# Patient Record
Sex: MALE | URBAN - METROPOLITAN AREA
[De-identification: names, ages, dates, MRNs, and addresses within clinical notes are randomized per-mention and may not be internally consistent; named-entity substitution may affect disease eponyms.]

---

## 2021-04-28 ENCOUNTER — HISTORICAL (OUTPATIENT)
Dept: INFECTIOUS DISEASES | Facility: HOSPITAL | Age: 41
End: 2021-04-28

## 2024-05-02 DIAGNOSIS — C18.9 COLON CANCER METASTASIZED TO LUNG: Primary | ICD-10-CM

## 2024-05-02 DIAGNOSIS — C78.00 COLON CANCER METASTASIZED TO LUNG: Primary | ICD-10-CM

## 2024-05-06 PROBLEM — C19 COLORECTAL CANCER, STAGE IV: Status: ACTIVE | Noted: 2024-05-06

## 2024-05-06 NOTE — PROGRESS NOTES
Referring physician: Self  Reason for referral: Metastatic Colorectal Carcinoma      Subjective:       Patient ID: Owen Swanson is a 44 y.o. male.    Rectal Cancer Stage IV--Diagnosed 23  Biopsy/pathology:   1. Colonoscopy 23--large fungating rectal mass 5-10cm from anal verge to approximately 20cm, biopsy with well differentiated colorectal adenocarcinoma.   2. Bronchoscopy with biopsy of endobronchial mass RUL biopsy done 3/16/23--metastatic poorly differentiated colonic adenocarcinoma. Foundation One with PD-L1 0%, ALK rearrangement, KRAS G12V, AKT2 amplification, APCV, FBXW7, CCND3 amplification, TP53, VEGFA amplification, TMB 8, MSI-high not detected, elevated tumor fraction.   3. Liquid Biopsy Guardant 360 done 24--KRAS G12V, APC , TP53 R248W, MSI high not detected, NRAS, BRAF, Her2 and NTRK negative.  Imagin. PET/CT 24--interval enlargement and increased metabolic activity of primary rectal mass, c/w local disease progression, stable hypermetabolic hepatic metastatic disease, grossly stable infiltrating tumor in RUL bronchus, suspected subtle interval enlargement of subcentimeter nodule in TIGRE and RLL.   2. CT C/A/P w/ contrast done at [facility] 3/18/24--Similar to slight increase in size of left upper and right lower lobe pulmonary nodules. Grossly similar right upper lobe peribronchial lesion and nodularity, similar to slight increase in size of conglomerate maria isabel hepatic and peripancreatic lymphadenopathy, grossly similar metastatic liver lesions.   3. PET/CT 24--Findings concerning for progression of primary rectal mass and metastatic disease.     Treatment history:  FOLFOX/Avastin 23 followed by maintenance.  Progression in 2023.   FOLFIRI/Avastin 2023--24 (stopped due to progression).    Current treatment plan:   Alectinib 600mg PO BID to start soon.     Chief Complaint: Other Misc (NPO), Diarrhea, Constipation, and Abdominal Pain    HPI  43 yo male  diagnosed with Stage IV rectal cancer in 3/2023. Initial CT showed liver metastases, abdominal adenopathy, rectal mass, and RUL obstructing lung mass. He underwent colonoscopy and showed a large rectal cancer, biopsy positive for adenocarcinoma. Patient also underwent bronchoscopy with biopsy of RUL endobronchial mass causing obstruction. Pathology c/w metastatic colorectal adenocarcinoma. Foundation One showed KRAS mutation and ALK rearrangement. No other actionable mutations. He was seen by Dr. Crook at [facility] and started on FOLFOX Avastin and went on to receive maintenance treatment with 5FU + Avastin. He experienced progression around 11/2023 and was changed to FOLFIRI. Most recent PET/CT from 4/29/24 shows progression. His physician at [facility] has recommended potential immunotherapy for TMB of 8. Patient presents to clinic for second opinion with his wife. Patient is still very active. Reports that he has some RUQ pain at times, for which he takes only Ibuprofen. He is still working. 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 He does have issues with constipation alternating with diarrhea as well. Discussed my recommendations for treatment with Alectinib based on ALK mutation and he is in agreement. He does mention that he had a lot of nausea with his chemotherapy.     Past Medical History:   Diagnosis Date    Anxiety     Hypertension       History reviewed. No pertinent surgical history.  Family History   Problem Relation Name Age of Onset    Hypertension Mother      Coronary artery disease Father      Hypertension Father      Diabetes Father       Social History     Socioeconomic History    Marital status:    Tobacco Use    Smoking status: Former     Types: Cigarettes    Smokeless tobacco: Never   Substance and Sexual Activity    Alcohol use: Not Currently    Drug use: Yes     Types: Marijuana       Review of patient's allergies indicates:  No Known Allergies   Current Outpatient  Medications on File Prior to Visit   Medication Sig Dispense Refill    albuterol (PROVENTIL) 2.5 mg /3 mL (0.083 %) nebulizer solution Inhale 2.5 mg into the lungs every 6 (six) hours as needed.      amlodipine-benazepril 10-20mg (LOTREL) 10-20 mg per capsule Take 1 capsule by mouth once daily.      ascorbic acid, vitamin C, (VITAMIN C) 1000 MG tablet Take 4 tablets by mouth every evening.      cholecalciferol, vitamin D3, (VITAMIN D3) 25 mcg (1,000 unit) capsule Take 1 capsule by mouth every evening.      fluoride, sodium, (PREVIDENT 5000 BOOSTER PLUS) 1.1 % Pste       gabapentin (NEURONTIN) 300 MG capsule Start with taking 300 mg once nightly x 3 days and then increase to 300 mg twice daily x 3 days and then 300 mg three times a day thereafter.      magnesium 200 mg Tab Take 2 tablets by mouth every evening.      omeprazole (PRILOSEC) 20 MG capsule Take 1 capsule by mouth every morning.      ondansetron (ZOFRAN) 4 MG tablet Take 4 mg by mouth every 8 (eight) hours as needed.      promethazine (PHENERGAN) 25 MG tablet Take 25 mg by mouth every 6 (six) hours as needed.      zinc acetate 50 mg (zinc) Cap Take 1 tablet by mouth every evening.      cyanocobalamin (VITAMIN B-12) 100 MCG tablet Take 1 tablet by mouth every evening. (Patient not taking: Reported on 5/9/2024)       No current facility-administered medications on file prior to visit.      Review of Systems   Constitutional:  Negative for appetite change and unexpected weight change.   HENT:  Negative for mouth sores.    Eyes:  Negative for visual disturbance.   Respiratory:  Negative for cough and shortness of breath.    Cardiovascular:  Positive for chest pain.   Gastrointestinal:  Positive for abdominal pain and diarrhea.   Genitourinary:  Positive for frequency.   Musculoskeletal:  Negative for back pain.   Neurological:  Negative for headaches.   Hematological:  Negative for adenopathy.   Psychiatric/Behavioral:  The patient is nervous/anxious.         "     Vitals:    05/09/24 1407   BP: 124/89   Pulse: 86   Resp: 14   Temp: 98.2 °F (36.8 °C)   TempSrc: Oral   SpO2: 97%   Weight: 97.4 kg (214 lb 11.2 oz)   Height: 5' 9" (1.753 m)      Physical Exam  Constitutional:       General: He is awake.      Appearance: Normal appearance.   HENT:      Head: Normocephalic and atraumatic.      Nose: Nose normal.      Mouth/Throat:      Mouth: Mucous membranes are moist.   Eyes:      General: Vision grossly intact.      Extraocular Movements: Extraocular movements intact.      Conjunctiva/sclera: Conjunctivae normal.   Cardiovascular:      Rate and Rhythm: Normal rate and regular rhythm.      Heart sounds: Normal heart sounds.   Pulmonary:      Effort: Pulmonary effort is normal.      Breath sounds: Normal breath sounds.   Chest:      Comments: Left chest wall mediport in place  Abdominal:      General: Bowel sounds are normal. There is no distension.      Palpations: Abdomen is soft.      Tenderness: There is no abdominal tenderness.   Musculoskeletal:      Cervical back: Normal range of motion and neck supple.      Right lower leg: No edema.      Left lower leg: No edema.   Lymphadenopathy:      Cervical: No cervical adenopathy.      Upper Body:      Right upper body: No supraclavicular adenopathy.      Left upper body: No supraclavicular adenopathy.   Skin:     General: Skin is warm.   Neurological:      Mental Status: He is alert and oriented to person, place, and time.      Motor: Motor function is intact.   Psychiatric:         Mood and Affect: Mood normal.         Speech: Speech normal.         Behavior: Behavior is cooperative.         Judgment: Judgment normal.       Office Visit on 05/09/2024   Component Date Value Ref Range Status    WBC 05/09/2024 8.27  4.50 - 11.50 x10(3)/mcL Final    RBC 05/09/2024 4.37 (L)  4.70 - 6.10 x10(6)/mcL Final    Hgb 05/09/2024 13.4 (L)  14.0 - 18.0 g/dL Final    Hct 05/09/2024 39.1 (L)  42.0 - 52.0 % Final    MCV 05/09/2024 89.5  80.0 - " 94.0 fL Final    MCH 05/09/2024 30.7  27.0 - 31.0 pg Final    MCHC 05/09/2024 34.3  33.0 - 36.0 g/dL Final    RDW 05/09/2024 14.3  11.5 - 17.0 % Final    Platelet 05/09/2024 224  130 - 400 x10(3)/mcL Final    MPV 05/09/2024 9.9  7.4 - 10.4 fL Final    Neut % 05/09/2024 57.8  % Final    Lymph % 05/09/2024 17.9  % Final    Mono % 05/09/2024 21.3  % Final    Eos % 05/09/2024 1.8  % Final    Basophil % 05/09/2024 0.6  % Final    Lymph # 05/09/2024 1.48  0.6 - 4.6 x10(3)/mcL Final    Neut # 05/09/2024 4.78  2.1 - 9.2 x10(3)/mcL Final    Mono # 05/09/2024 1.76 (H)  0.1 - 1.3 x10(3)/mcL Final    Eos # 05/09/2024 0.15  0 - 0.9 x10(3)/mcL Final    Baso # 05/09/2024 0.05  <=0.2 x10(3)/mcL Final    IG# 05/09/2024 0.05 (H)  0 - 0.04 x10(3)/mcL Final    IG% 05/09/2024 0.6  % Final                  Assessment:       1. Colorectal cancer, stage IV    2. Colon cancer metastasized to lung         Plan:       Patient with Stage IV rectal cancer, lung, liver, abdominal mariya metastases diagnosed in 2/2023. KRAS mutated.  Patient has failed FOLFOX avastin and FOLFIRI avastin.  Most Recent PET from 4/29/24 shows disease progression and CEA rising.    In review of previous Foundation One testing, noted to have an ALK rearrangement.   I did a literature review and there have been documented responses in colon cancers treatment with ALK inhibitors.   Case presented at [facility] clinical trials tumor board. No current clinical trials available, but treatment recommended with Alectinib.   Case will also be presented at molecular tumor board.    I discussed conventional treatment with 3rd line Stivarga vs. Lonsurf which have not historically had good outcomes.  Would favor a more aggressive approach to treatment given his excellent functional status and young age.  I do not think that Keytruda or immunotherapy would be effective given JESUS.     Will send prescription for Alectinib 600mg PO BID to specialty pharmacy for insurance approval.    Patient given information from [employer] on Alectinib and discussed the common side effects.   PharmD to provide further education as needed.     Recommendations for monitoring are LFT's Q2 weeks X 3 months then monthly.  CK every 2 weeks X 4 weeks then as clinically indicated.    Hopefully he will obtain his medication w/n a week.  F/u in 3 weeks labs and toxicity check visit.    Plan to repeat CT after 3 months of treatment. Will need to have images from most recent PET and CT uploaded into our system from [facility] for comparison.    Start Tramadol PRN for RUQ pain, prescription given today #90.  Start mediport flushes in June.    Patient has no FH of any cancer, may need genetic testing due to the APC mutation.    All questions answered at this time.    Discussed incurable nature of disease and continued palliative goals of treatment.      Renee Gipson MD

## 2024-05-08 ENCOUNTER — TUMOR BOARD CONFERENCE (OUTPATIENT)
Dept: HEMATOLOGY/ONCOLOGY | Facility: CLINIC | Age: 44
End: 2024-05-08
Payer: COMMERCIAL

## 2024-05-08 RX ORDER — AMLODIPINE AND BENAZEPRIL HYDROCHLORIDE 10; 20 MG/1; MG/1
1 CAPSULE ORAL DAILY
COMMUNITY
Start: 2024-03-26

## 2024-05-08 RX ORDER — PROMETHAZINE HYDROCHLORIDE 25 MG/1
25 TABLET ORAL EVERY 6 HOURS PRN
COMMUNITY
Start: 2024-03-05

## 2024-05-08 RX ORDER — ALBUTEROL SULFATE 0.83 MG/ML
2.5 SOLUTION RESPIRATORY (INHALATION) EVERY 6 HOURS PRN
COMMUNITY
Start: 2024-04-29

## 2024-05-08 RX ORDER — MAGNESIUM 200 MG
2 TABLET ORAL NIGHTLY
COMMUNITY

## 2024-05-08 RX ORDER — PNV NO.95/FERROUS FUM/FOLIC AC 28MG-0.8MG
1 TABLET ORAL NIGHTLY
COMMUNITY

## 2024-05-08 RX ORDER — VIT C/E/ZN/COPPR/LUTEIN/ZEAXAN 250MG-90MG
1 CAPSULE ORAL NIGHTLY
COMMUNITY

## 2024-05-08 RX ORDER — SODIUM FLUORIDE 6 MG/ML
PASTE, DENTIFRICE DENTAL
COMMUNITY
Start: 2023-10-02

## 2024-05-08 RX ORDER — GABAPENTIN 300 MG/1
CAPSULE ORAL
COMMUNITY
Start: 2023-10-23

## 2024-05-08 RX ORDER — OMEPRAZOLE 20 MG/1
1 CAPSULE, DELAYED RELEASE ORAL EVERY MORNING
COMMUNITY
Start: 2024-03-21

## 2024-05-08 RX ORDER — IBUPROFEN 100 MG/5ML
4 SUSPENSION, ORAL (FINAL DOSE FORM) ORAL NIGHTLY
COMMUNITY

## 2024-05-08 RX ORDER — ONDANSETRON 4 MG/1
4 TABLET, FILM COATED ORAL EVERY 8 HOURS PRN
COMMUNITY
Start: 2024-03-25

## 2024-05-08 NOTE — PROGRESS NOTES
[facility] Tumor Board    Date: 5/8/2024    Patient Name: Owen Swanson    MRN: [id number]    Diagnosis: Metastatic Rectal Cancer - Diagnosed in 2/2023.    Referring Provider: Dr. Brandan Braswell PCTP Providers:     Dr. Lico Trujillo, Estefany River NP, Jo-Ann Miller NP    Patient Summary:  Pathology:    Has failed Chemotherapy  Failed chemotherapy: 1. FOLFOX/Avastin. 2. FOLFIRI/Avastin (11/15/23) > PET on 4/29 showing progression.    Current treatment(s):    ECOG: Restricted in physically strenuous activity but ambulatory and able to carry out work of a light or sedentary nature, e.g., light house work, office work    Molecular Workup:    [facility] Details: ALK rearrangement, KRAS G12V, AKT2 amplifications, [id number], [id number], CCND3 amplifications, TP53, VEGFA amplifications, TMB 8, PD-L1 0, JESUS, HER2 negative.      Board Recommendations:    Standard of care recommendations: 1. Alectinib 2. Lonsurf+avastin. Present a molecular TB.   Trial recommendations: Late phase: no available late phase trials  Early phase:  DNQ IGM d/t folfiri, also no slots. BGB: DNQ d/t prior lines of therapy. NTX waitlist but no slots available right now. Add to early phase waitlist.

## 2024-05-08 NOTE — Clinical Note
No trials for this patient right. Would recommend Alectinib as next line. We will add him to our waitlist for early phase trials. This patient may be good to discuss at our molecular TB. Please let Corazon know if you are interested in presenting this patient.   Thanks, Estefany

## 2024-05-09 ENCOUNTER — OFFICE VISIT (OUTPATIENT)
Dept: HEMATOLOGY/ONCOLOGY | Facility: CLINIC | Age: 44
End: 2024-05-09
Payer: COMMERCIAL

## 2024-05-09 ENCOUNTER — DOCUMENTATION ONLY (OUTPATIENT)
Dept: HEMATOLOGY/ONCOLOGY | Facility: CLINIC | Age: 44
End: 2024-05-09
Payer: COMMERCIAL

## 2024-05-09 VITALS
SYSTOLIC BLOOD PRESSURE: 124 MMHG | WEIGHT: 214.69 LBS | OXYGEN SATURATION: 97 % | HEART RATE: 86 BPM | DIASTOLIC BLOOD PRESSURE: 89 MMHG | BODY MASS INDEX: 31.8 KG/M2 | HEIGHT: 69 IN | RESPIRATION RATE: 14 BRPM | TEMPERATURE: 98 F

## 2024-05-09 DIAGNOSIS — C78.00 COLON CANCER METASTASIZED TO LUNG: ICD-10-CM

## 2024-05-09 DIAGNOSIS — C18.9 COLON CANCER METASTASIZED TO LUNG: ICD-10-CM

## 2024-05-09 DIAGNOSIS — C19 COLORECTAL CANCER, STAGE IV: Primary | ICD-10-CM

## 2024-05-09 DIAGNOSIS — C78.00 COLON CANCER METASTASIZED TO LUNG: Primary | ICD-10-CM

## 2024-05-09 DIAGNOSIS — C18.9 COLON CANCER METASTASIZED TO LUNG: Primary | ICD-10-CM

## 2024-05-09 LAB
ALBUMIN SERPL-MCNC: 3.9 G/DL (ref 3.5–5)
ALBUMIN/GLOB SERPL: 1.1 RATIO (ref 1.1–2)
ALP SERPL-CCNC: 153 UNIT/L (ref 40–150)
ALT SERPL-CCNC: 27 UNIT/L (ref 0–55)
AST SERPL-CCNC: 36 UNIT/L (ref 5–34)
BASOPHILS # BLD AUTO: 0.05 X10(3)/MCL
BASOPHILS NFR BLD AUTO: 0.6 %
BILIRUB SERPL-MCNC: 0.4 MG/DL
BUN SERPL-MCNC: 14 MG/DL (ref 8.9–20.6)
CALCIUM SERPL-MCNC: 9.3 MG/DL (ref 8.4–10.2)
CEA SERPL-MCNC: 904.14 NG/ML (ref 0–3)
CHLORIDE SERPL-SCNC: 108 MMOL/L (ref 98–107)
CK SERPL-CCNC: 56 U/L (ref 30–200)
CO2 SERPL-SCNC: 25 MMOL/L (ref 22–29)
CREAT SERPL-MCNC: 0.94 MG/DL (ref 0.73–1.18)
EOSINOPHIL # BLD AUTO: 0.15 X10(3)/MCL (ref 0–0.9)
EOSINOPHIL NFR BLD AUTO: 1.8 %
ERYTHROCYTE [DISTWIDTH] IN BLOOD BY AUTOMATED COUNT: 14.3 % (ref 11.5–17)
GFR SERPLBLD CREATININE-BSD FMLA CKD-EPI: >60 ML/MIN/1.73/M2
GLOBULIN SER-MCNC: 3.5 GM/DL (ref 2.4–3.5)
GLUCOSE SERPL-MCNC: 96 MG/DL (ref 74–100)
HCT VFR BLD AUTO: 39.1 % (ref 42–52)
HGB BLD-MCNC: 13.4 G/DL (ref 14–18)
IMM GRANULOCYTES # BLD AUTO: 0.05 X10(3)/MCL (ref 0–0.04)
IMM GRANULOCYTES NFR BLD AUTO: 0.6 %
LYMPHOCYTES # BLD AUTO: 1.48 X10(3)/MCL (ref 0.6–4.6)
LYMPHOCYTES NFR BLD AUTO: 17.9 %
MCH RBC QN AUTO: 30.7 PG (ref 27–31)
MCHC RBC AUTO-ENTMCNC: 34.3 G/DL (ref 33–36)
MCV RBC AUTO: 89.5 FL (ref 80–94)
MONOCYTES # BLD AUTO: 1.76 X10(3)/MCL (ref 0.1–1.3)
MONOCYTES NFR BLD AUTO: 21.3 %
NEUTROPHILS # BLD AUTO: 4.78 X10(3)/MCL (ref 2.1–9.2)
NEUTROPHILS NFR BLD AUTO: 57.8 %
PLATELET # BLD AUTO: 224 X10(3)/MCL (ref 130–400)
PMV BLD AUTO: 9.9 FL (ref 7.4–10.4)
POTASSIUM SERPL-SCNC: 4.9 MMOL/L (ref 3.5–5.1)
PROT SERPL-MCNC: 7.4 GM/DL (ref 6.4–8.3)
RBC # BLD AUTO: 4.37 X10(6)/MCL (ref 4.7–6.1)
SODIUM SERPL-SCNC: 139 MMOL/L (ref 136–145)
WBC # SPEC AUTO: 8.27 X10(3)/MCL (ref 4.5–11.5)

## 2024-05-09 PROCEDURE — 82378 CARCINOEMBRYONIC ANTIGEN: CPT | Performed by: INTERNAL MEDICINE

## 2024-05-09 PROCEDURE — 4010F ACE/ARB THERAPY RXD/TAKEN: CPT | Mod: CPTII,S$GLB,, | Performed by: INTERNAL MEDICINE

## 2024-05-09 PROCEDURE — 1159F MED LIST DOCD IN RCRD: CPT | Mod: CPTII,S$GLB,, | Performed by: INTERNAL MEDICINE

## 2024-05-09 PROCEDURE — 85025 COMPLETE CBC W/AUTO DIFF WBC: CPT | Performed by: INTERNAL MEDICINE

## 2024-05-09 PROCEDURE — 82550 ASSAY OF CK (CPK): CPT | Performed by: INTERNAL MEDICINE

## 2024-05-09 PROCEDURE — 80053 COMPREHEN METABOLIC PANEL: CPT | Performed by: INTERNAL MEDICINE

## 2024-05-09 PROCEDURE — 3008F BODY MASS INDEX DOCD: CPT | Mod: CPTII,S$GLB,, | Performed by: INTERNAL MEDICINE

## 2024-05-09 PROCEDURE — 99999 PR PBB SHADOW E&M-EST. PATIENT-LVL IV: CPT | Mod: PBBFAC,,, | Performed by: INTERNAL MEDICINE

## 2024-05-09 PROCEDURE — 3074F SYST BP LT 130 MM HG: CPT | Mod: CPTII,S$GLB,, | Performed by: INTERNAL MEDICINE

## 2024-05-09 PROCEDURE — 3079F DIAST BP 80-89 MM HG: CPT | Mod: CPTII,S$GLB,, | Performed by: INTERNAL MEDICINE

## 2024-05-09 PROCEDURE — 99205 OFFICE O/P NEW HI 60 MIN: CPT | Mod: S$GLB,,, | Performed by: INTERNAL MEDICINE

## 2024-05-09 PROCEDURE — 36415 COLL VENOUS BLD VENIPUNCTURE: CPT | Performed by: INTERNAL MEDICINE

## 2024-05-09 RX ORDER — TRAMADOL HYDROCHLORIDE 50 MG/1
50 TABLET ORAL EVERY 6 HOURS PRN
Qty: 90 TABLET | Refills: 3 | Status: SHIPPED | OUTPATIENT
Start: 2024-05-09

## 2024-05-09 NOTE — Clinical Note
Will need his recent CT and recent PET from [facility] images added to our system for comparison in the future as his scans will be done here from now on.

## 2024-05-09 NOTE — PROGRESS NOTES
Spoke with patient and wife following new patient appointment with Dr. Gipson. Patient is in good spirits and feels good about plan moving forward. He has no questions or concerns at this time. Explained my role as RN navigator. He is aware of how to reach navigator should he need to.

## 2024-05-13 DIAGNOSIS — C78.00 COLON CANCER METASTASIZED TO LUNG: Primary | ICD-10-CM

## 2024-05-13 DIAGNOSIS — C19 COLORECTAL CANCER, STAGE IV: ICD-10-CM

## 2024-05-13 DIAGNOSIS — C18.9 COLON CANCER METASTASIZED TO LUNG: Primary | ICD-10-CM

## 2024-05-14 DIAGNOSIS — C19 COLORECTAL CANCER, STAGE IV: Primary | ICD-10-CM

## 2024-05-14 DIAGNOSIS — C78.00 COLON CANCER METASTASIZED TO LUNG: ICD-10-CM

## 2024-05-14 DIAGNOSIS — C18.9 COLON CANCER METASTASIZED TO LUNG: ICD-10-CM

## 2024-05-27 NOTE — PROGRESS NOTES
Subjective:       Patient ID: Owen Swanson is a 44 y.o. male.    Rectal Cancer Stage IV--Diagnosed 23  Biopsy/pathology:   1. Colonoscopy 23--large fungating rectal mass 5-10cm from anal verge to approximately 20cm, biopsy with well differentiated colorectal adenocarcinoma.   2. Bronchoscopy with biopsy of endobronchial mass RUL biopsy done 3/16/23--metastatic poorly differentiated colonic adenocarcinoma. Foundation One with PD-L1 0%, ALK rearrangement, KRAS G12V, AKT2 amplification, APCV, FBXW7, CCND3 amplification, TP53, VEGFA amplification, TMB 8, MSI-high not detected, elevated tumor fraction.   3. Liquid Biopsy Guardant 360 done 24--KRAS G12V, APC , TP53 R248W, MSI high not detected, NRAS, BRAF, Her2 and NTRK negative.  Imagin. PET/CT 24--interval enlargement and increased metabolic activity of primary rectal mass, c/w local disease progression, stable hypermetabolic hepatic metastatic disease, grossly stable infiltrating tumor in RUL bronchus, suspected subtle interval enlargement of subcentimeter nodule in TIGRE and RLL.   2. CT C/A/P w/ contrast done at [facility] 3/18/24--Similar to slight increase in size of left upper and right lower lobe pulmonary nodules. Grossly similar right upper lobe peribronchial lesion and nodularity, similar to slight increase in size of conglomerate maria isabel hepatic and peripancreatic lymphadenopathy, grossly similar metastatic liver lesions.   3. PET/CT 24--Findings concerning for progression of primary rectal mass and metastatic disease.   4. CT A/P done 24--long segment rectal wall thickening and nodularity compatible with known rectal malignancy, multiple calcified abdominopelvic lymph nodes compatible with metastatic nodes. Reference portal caval lymph node measures 2.4 cm short axis, Multiple bilobar hypodense, variably calcified hepatic masses compatible with colorectal metastases. Largest discrete lesion in the right lobe measures  approximately 8.7 cm, findings similar to prior.     Treatment history:  FOLFOX/Avastin 2/22/23 followed by maintenance.  Progression in 11/2023.   FOLFIRI/Avastin 11/2023--4/29/24 (stopped due to progression).    Current treatment plan:   Alectinib 600mg PO BID started 5/22/24.     Chief Complaint: Pain (Pt reports that he is passing gas and no longer passing liquid since ER visit.), Constipation, and Other Misc (Pt reports muscle fatigue in shoulders, hips and legs yesterday.)    HPI  Patient presents for follow-up of Stage IV rectal cancer. He did start on the Alecensa last week on 5/22/24. So far, he reports having fatigue, constipation and myalgias. He also went to ED for constipation. CT scan showed no obstruction. He was placed on Lactulose which has helped some, but he is still having hard stools. We discussed using Senekot-S OTC and taking 2-3 PO BID. He continues with intermittent right sided abdominal pain from known liver metastases for which he is taking pain medication and it helps. But he does not take as often as he needs.     Past Medical History:   Diagnosis Date    Anxiety     Hypertension       History reviewed. No pertinent surgical history.  Family History   Problem Relation Name Age of Onset    Hypertension Mother      Coronary artery disease Father      Hypertension Father      Diabetes Father       Social History     Socioeconomic History    Marital status:    Tobacco Use    Smoking status: Former     Types: Cigarettes    Smokeless tobacco: Never   Substance and Sexual Activity    Alcohol use: Not Currently    Drug use: Yes     Types: Marijuana       Review of patient's allergies indicates:  No Known Allergies   Current Outpatient Medications on File Prior to Visit   Medication Sig Dispense Refill    albuterol (PROVENTIL) 2.5 mg /3 mL (0.083 %) nebulizer solution Inhale 2.5 mg into the lungs every 6 (six) hours as needed.      alectinib (ALECENSA) 150 mg Cap Take 4 capsules (600 mg  total) by mouth twice daily with food. 240 capsule 3    amlodipine-benazepril 10-20mg (LOTREL) 10-20 mg per capsule Take 1 capsule by mouth once daily.      ascorbic acid, vitamin C, (VITAMIN C) 1000 MG tablet Take 4 tablets by mouth every evening.      cholecalciferol, vitamin D3, (VITAMIN D3) 25 mcg (1,000 unit) capsule Take 1 capsule by mouth every evening.      fluoride, sodium, (PREVIDENT 5000 BOOSTER PLUS) 1.1 % Pste       gabapentin (NEURONTIN) 300 MG capsule Start with taking 300 mg once nightly x 3 days and then increase to 300 mg twice daily x 3 days and then 300 mg three times a day thereafter.      lactulose (CHRONULAC) 20 gram/30 mL Soln Take 30 mLs (20 g total) by mouth 2 (two) times daily. for 5 days 300 mL 0    magnesium 200 mg Tab Take 2 tablets by mouth every evening.      omeprazole (PRILOSEC) 20 MG capsule Take 1 capsule by mouth every morning.      ondansetron (ZOFRAN) 4 MG tablet Take 4 mg by mouth every 8 (eight) hours as needed.      promethazine (PHENERGAN) 25 MG tablet Take 25 mg by mouth every 6 (six) hours as needed.      traMADoL (ULTRAM) 50 mg tablet Take 1 tablet (50 mg total) by mouth every 6 (six) hours as needed for Pain. 90 tablet 3    zinc acetate 50 mg (zinc) Cap Take 1 tablet by mouth every evening.      cyanocobalamin (VITAMIN B-12) 100 MCG tablet Take 1 tablet by mouth every evening. (Patient not taking: Reported on 5/30/2024)       No current facility-administered medications on file prior to visit.      Review of Systems   Constitutional:  Positive for fatigue. Negative for appetite change and unexpected weight change.   HENT:  Negative for mouth sores.    Eyes:  Negative for visual disturbance.   Respiratory:  Negative for cough and shortness of breath.    Cardiovascular:  Negative for chest pain.   Gastrointestinal:  Positive for abdominal pain and constipation. Negative for diarrhea.   Genitourinary:  Positive for frequency.   Musculoskeletal:  Positive for myalgias.  "Negative for back pain.   Neurological:  Negative for headaches.   Hematological:  Negative for adenopathy.   Psychiatric/Behavioral:  The patient is nervous/anxious.             Vitals:    05/30/24 0854   BP: 118/74   Pulse: 63   Resp: 14   Temp: 98 °F (36.7 °C)   TempSrc: Oral   SpO2: 99%   Weight: 99.2 kg (218 lb 9.6 oz)   Height: 5' 9" (1.753 m)        Physical Exam  Constitutional:       General: He is awake.      Appearance: Normal appearance.   HENT:      Head: Normocephalic and atraumatic.      Nose: Nose normal.      Mouth/Throat:      Mouth: Mucous membranes are moist.   Eyes:      General: Vision grossly intact.      Extraocular Movements: Extraocular movements intact.      Conjunctiva/sclera: Conjunctivae normal.   Cardiovascular:      Rate and Rhythm: Normal rate and regular rhythm.      Heart sounds: Normal heart sounds.   Pulmonary:      Effort: Pulmonary effort is normal.      Breath sounds: Normal breath sounds.   Chest:      Comments: Left chest wall mediport in place  Abdominal:      General: Bowel sounds are normal. There is no distension.      Palpations: Abdomen is soft.      Tenderness: There is no abdominal tenderness.   Musculoskeletal:      Cervical back: Normal range of motion and neck supple.      Right lower leg: No edema.      Left lower leg: No edema.   Lymphadenopathy:      Cervical: No cervical adenopathy.      Upper Body:      Right upper body: No supraclavicular adenopathy.      Left upper body: No supraclavicular adenopathy.   Skin:     General: Skin is warm.   Neurological:      Mental Status: He is alert and oriented to person, place, and time.      Motor: Motor function is intact.   Psychiatric:         Mood and Affect: Mood normal.         Speech: Speech normal.         Behavior: Behavior is cooperative.         Judgment: Judgment normal.       Lab Visit on 05/30/2024   Component Date Value Ref Range Status    WBC 05/30/2024 12.38 (H)  4.50 - 11.50 x10(3)/mcL Final    RBC " 05/30/2024 4.36 (L)  4.70 - 6.10 x10(6)/mcL Final    Hgb 05/30/2024 12.9 (L)  14.0 - 18.0 g/dL Final    Hct 05/30/2024 38.0 (L)  42.0 - 52.0 % Final    MCV 05/30/2024 87.2  80.0 - 94.0 fL Final    MCH 05/30/2024 29.6  27.0 - 31.0 pg Final    MCHC 05/30/2024 33.9  33.0 - 36.0 g/dL Final    RDW 05/30/2024 14.3  11.5 - 17.0 % Final    Platelet 05/30/2024 275  130 - 400 x10(3)/mcL Final    MPV 05/30/2024 10.1  7.4 - 10.4 fL Final    Neut % 05/30/2024 70.8  % Final    Lymph % 05/30/2024 15.7  % Final    Mono % 05/30/2024 9.2  % Final    Eos % 05/30/2024 3.8  % Final    Basophil % 05/30/2024 0.3  % Final    Lymph # 05/30/2024 1.94  0.6 - 4.6 x10(3)/mcL Final    Neut # 05/30/2024 8.77  2.1 - 9.2 x10(3)/mcL Final    Mono # 05/30/2024 1.14  0.1 - 1.3 x10(3)/mcL Final    Eos # 05/30/2024 0.47  0 - 0.9 x10(3)/mcL Final    Baso # 05/30/2024 0.04  <=0.2 x10(3)/mcL Final    IG# 05/30/2024 0.02  0 - 0.04 x10(3)/mcL Final    IG% 05/30/2024 0.2  % Final         Assessment:       1. Colorectal cancer, stage IV    2. Colon cancer metastasized to lung        Plan:       Patient with Stage IV rectal cancer, lung, liver, abdominal mariya metastases diagnosed in 2/2023. KRAS mutated.  Patient has failed FOLFOX avastin and FOLFIRI avastin.  Most Recent PET from 4/29/24 shows disease progression and CEA rising.    In review of previous Foundation One testing, noted to have an ALK rearrangement.   I did a literature review and there have been documented responses in colon cancers treatment with ALK inhibitors.   Case presented at [facility] clinical trials tumor board. No current clinical trials available, but treatment recommended with Alectinib.   Case will also be presented at molecular tumor board on 6/4/24.    I discussed conventional treatment with 3rd line Stivarga vs. Lonsurf which have not historically had good outcomes.  Would favor a more aggressive approach to treatment given his excellent functional status and young age.  I do not  think that Keytruda or immunotherapy would be effective given JESUS.     Patient started Alectinib 600mg PO BID on 5/22/24.  ER visit for constipation on 5/28/24. No obstruction, prescribed lactulose.  He is tolerating Alecensa okay, but does have some fatigue, constipation and myalgias.  CBC diff today with mildly elevated WBC, suspect this is from his cancer. Will f/u CMP and CK.   Continue same dose. If myalgias worsen, could consider dose reduction.     Recommendations for monitoring are LFT's Q2 weeks X 3 months then monthly.  CK every 2 weeks X 4 weeks then as clinically indicated.    F/u in 2 weeks with repeat CBC, CMP, CK and will repeat CEA.     Plan to repeat CT after 3 months of treatment in 8/2024.    Continue Tramadol PRN for RUQ pain.  Start mediport flushes in June.    Patient has no FH of any cancer, may need genetic testing due to the APC mutation.    All questions answered at this time.    Discussed incurable nature of disease and continued palliative goals of treatment.      Renee Gipson MD    Therapy Plan Information  Flushes  heparin, porcine (PF) 100 unit/mL injection flush 500 Units  500 Units, Intravenous, Every 12 weeks  sodium chloride 0.9% flush 10 mL  10 mL, Intravenous, Every 12 weeks

## 2024-05-28 ENCOUNTER — HOSPITAL ENCOUNTER (EMERGENCY)
Facility: HOSPITAL | Age: 44
Discharge: HOME | End: 2024-05-28
Attending: EMERGENCY MEDICINE
Payer: COMMERCIAL

## 2024-05-28 ENCOUNTER — TELEPHONE (OUTPATIENT)
Dept: HEMATOLOGY/ONCOLOGY | Facility: CLINIC | Age: 44
End: 2024-05-28
Payer: COMMERCIAL

## 2024-05-28 VITALS
OXYGEN SATURATION: 99 % | BODY MASS INDEX: 34.1 KG/M2 | HEIGHT: 68 IN | WEIGHT: 225 LBS | TEMPERATURE: 99 F | RESPIRATION RATE: 16 BRPM | DIASTOLIC BLOOD PRESSURE: 96 MMHG | SYSTOLIC BLOOD PRESSURE: 185 MMHG | HEART RATE: 75 BPM

## 2024-05-28 DIAGNOSIS — C79.9 METASTASIS FROM RECTAL CANCER: ICD-10-CM

## 2024-05-28 DIAGNOSIS — K59.00 CONSTIPATION, UNSPECIFIED CONSTIPATION TYPE: Primary | ICD-10-CM

## 2024-05-28 DIAGNOSIS — C20 METASTASIS FROM RECTAL CANCER: ICD-10-CM

## 2024-05-28 LAB
ALBUMIN SERPL-MCNC: 3.7 G/DL (ref 3.5–5)
ALBUMIN/GLOB SERPL: 0.9 RATIO (ref 1.1–2)
ALP SERPL-CCNC: 171 UNIT/L (ref 40–150)
ALT SERPL-CCNC: 30 UNIT/L (ref 0–55)
ANION GAP SERPL CALC-SCNC: 9 MEQ/L
AST SERPL-CCNC: 45 UNIT/L (ref 5–34)
BACTERIA #/AREA URNS AUTO: ABNORMAL /HPF
BASOPHILS # BLD AUTO: 0.05 X10(3)/MCL
BASOPHILS NFR BLD AUTO: 0.4 %
BILIRUB SERPL-MCNC: 0.6 MG/DL
BILIRUB UR QL STRIP.AUTO: NEGATIVE
BUN SERPL-MCNC: 18 MG/DL (ref 8.9–20.6)
CALCIUM SERPL-MCNC: 9.3 MG/DL (ref 8.4–10.2)
CHLORIDE SERPL-SCNC: 107 MMOL/L (ref 98–107)
CLARITY UR: CLEAR
CO2 SERPL-SCNC: 24 MMOL/L (ref 22–29)
COLOR UR AUTO: YELLOW
CREAT SERPL-MCNC: 1.14 MG/DL (ref 0.73–1.18)
CREAT/UREA NIT SERPL: 16
EOSINOPHIL # BLD AUTO: 0.09 X10(3)/MCL (ref 0–0.9)
EOSINOPHIL NFR BLD AUTO: 0.7 %
ERYTHROCYTE [DISTWIDTH] IN BLOOD BY AUTOMATED COUNT: 14.6 % (ref 11.5–17)
GFR SERPLBLD CREATININE-BSD FMLA CKD-EPI: >60 ML/MIN/1.73/M2
GLOBULIN SER-MCNC: 4 GM/DL (ref 2.4–3.5)
GLUCOSE SERPL-MCNC: 117 MG/DL (ref 74–100)
GLUCOSE UR QL STRIP: NORMAL
HCT VFR BLD AUTO: 34.7 % (ref 42–52)
HGB BLD-MCNC: 12 G/DL (ref 14–18)
HGB UR QL STRIP: NEGATIVE
IMM GRANULOCYTES # BLD AUTO: 0.08 X10(3)/MCL (ref 0–0.04)
IMM GRANULOCYTES NFR BLD AUTO: 0.6 %
INR PPP: 1.1
KETONES UR QL STRIP: ABNORMAL
LEUKOCYTE ESTERASE UR QL STRIP: NEGATIVE
LYMPHOCYTES # BLD AUTO: 1.64 X10(3)/MCL (ref 0.6–4.6)
LYMPHOCYTES NFR BLD AUTO: 13.2 %
MCH RBC QN AUTO: 30.1 PG (ref 27–31)
MCHC RBC AUTO-ENTMCNC: 34.6 G/DL (ref 33–36)
MCV RBC AUTO: 87 FL (ref 80–94)
MONOCYTES # BLD AUTO: 1.02 X10(3)/MCL (ref 0.1–1.3)
MONOCYTES NFR BLD AUTO: 8.2 %
MUCOUS THREADS URNS QL MICRO: ABNORMAL /LPF
NEUTROPHILS # BLD AUTO: 9.56 X10(3)/MCL (ref 2.1–9.2)
NEUTROPHILS NFR BLD AUTO: 76.9 %
NITRITE UR QL STRIP: NEGATIVE
NRBC BLD AUTO-RTO: 0 %
PH UR STRIP: 5.5 [PH]
PLATELET # BLD AUTO: 270 X10(3)/MCL (ref 130–400)
PMV BLD AUTO: 10.8 FL (ref 7.4–10.4)
POTASSIUM SERPL-SCNC: 3.7 MMOL/L (ref 3.5–5.1)
PROT SERPL-MCNC: 7.7 GM/DL (ref 6.4–8.3)
PROT UR QL STRIP: ABNORMAL
PROTHROMBIN TIME: 13.9 SECONDS (ref 12.5–14.5)
RBC # BLD AUTO: 3.99 X10(6)/MCL (ref 4.7–6.1)
RBC #/AREA URNS AUTO: ABNORMAL /HPF
SODIUM SERPL-SCNC: 140 MMOL/L (ref 136–145)
SP GR UR STRIP.AUTO: 1.03 (ref 1–1.03)
SQUAMOUS #/AREA URNS LPF: ABNORMAL /HPF
UROBILINOGEN UR STRIP-ACNC: 2
WBC # SPEC AUTO: 12.44 X10(3)/MCL (ref 4.5–11.5)
WBC #/AREA URNS AUTO: ABNORMAL /HPF

## 2024-05-28 PROCEDURE — 85610 PROTHROMBIN TIME: CPT | Performed by: STUDENT IN AN ORGANIZED HEALTH CARE EDUCATION/TRAINING PROGRAM

## 2024-05-28 PROCEDURE — 80053 COMPREHEN METABOLIC PANEL: CPT

## 2024-05-28 PROCEDURE — 85025 COMPLETE CBC W/AUTO DIFF WBC: CPT

## 2024-05-28 PROCEDURE — 99285 EMERGENCY DEPT VISIT HI MDM: CPT | Mod: 25

## 2024-05-28 PROCEDURE — 81001 URINALYSIS AUTO W/SCOPE: CPT

## 2024-05-28 PROCEDURE — 25500020 PHARM REV CODE 255: Performed by: EMERGENCY MEDICINE

## 2024-05-28 RX ORDER — LACTULOSE 10 G/15ML
20 SOLUTION ORAL 2 TIMES DAILY
Qty: 300 ML | Refills: 0 | Status: SHIPPED | OUTPATIENT
Start: 2024-05-28 | End: 2024-06-02

## 2024-05-28 RX ADMIN — IOHEXOL 100 ML: 350 INJECTION, SOLUTION INTRAVENOUS at 07:05

## 2024-05-28 NOTE — FIRST PROVIDER EVALUATION
"Medical screening examination initiated.  I have conducted a focused provider triage encounter, findings are as follows:    Brief history of present illness:  arrived to ED due to rectal bleeding. Hx of colon ca. Oncologist: Renee Mcgrath. Currently on oral chemo.     Vitals:    05/28/24 1631   BP: (!) 153/87   BP Location: Left arm   Patient Position: Sitting   Pulse: 86   Resp: (!) 24   Temp: 99.1 °F (37.3 °C)   TempSrc: Oral   SpO2: 99%   Weight: 102.1 kg (225 lb)   Height: 5' 8" (1.727 m)       Pertinent physical exam:  awake, alert, has non-labored breathing, ambulatory into triage.     Brief workup plan:  labs     Preliminary workup initiated; this workup will be continued and followed by the physician or advanced practice provider that is assigned to the patient when roomed.  "

## 2024-05-28 NOTE — ED PROVIDER NOTES
Encounter Date: 5/28/2024    SCRIBE #1 NOTE: I, Amanda Héctor, am scribing for, and in the presence of,  Abdirashid Jaramillo MD. I have scribed the following portions of the note - Other sections scribed: HPI, ROS, PE.       History     Chief Complaint   Patient presents with    Constipation     Pt arrives c/o constipation. Last BM 5/24. Reports only thing passing is malodorous bright red / yellow liquid. Denies vomiting. Hx stage IV colon cancer on daily oral chemo. Onc Dr. Renee Gipson.      Patient is a 44-year-old male with a history of asthma, HTN, and stage IV colorectal cancer receiving oral chemotherapy treatments presenting to the ED with c/o constipation. The pt reports constipation onset Friday and notes that he has not had a BM since then. He reports taking stool softeners without improvement. He reports passing bright red/yellow liquid. He denies vomiting.     The pt's oncologist is Renee Gipson MD.     The history is provided by the patient. No  was used.     Review of patient's allergies indicates:  No Known Allergies  Past Medical History:   Diagnosis Date    Anxiety     Hypertension      No past surgical history on file.  Family History   Problem Relation Name Age of Onset    Hypertension Mother      Coronary artery disease Father      Hypertension Father      Diabetes Father       Social History     Tobacco Use    Smoking status: Former     Types: Cigarettes    Smokeless tobacco: Never   Substance Use Topics    Alcohol use: Not Currently    Drug use: Yes     Types: Marijuana     Review of Systems   Constitutional:  Negative for chills and fever.   Respiratory:  Negative for cough and shortness of breath.    Cardiovascular:  Negative for chest pain.   Gastrointestinal:  Positive for constipation. Negative for abdominal pain, nausea and vomiting.   Musculoskeletal:  Negative for myalgias.   All other systems reviewed and are negative.      Physical Exam     Initial Vitals [05/28/24  1631]   BP Pulse Resp Temp SpO2   (!) 153/87 86 (!) 24 99.1 °F (37.3 °C) 99 %      MAP       --         Physical Exam    Nursing note and vitals reviewed.  Constitutional: He appears well-developed and well-nourished. No distress.   HENT:   Head: Normocephalic and atraumatic.   Eyes: Conjunctivae are normal.   Cardiovascular:  Normal rate.           Pulmonary/Chest: No respiratory distress. He has no wheezes. He has no rhonchi.   Abdominal: Abdomen is soft. There is no abdominal tenderness. There is no rebound and no guarding.   Musculoskeletal:         General: Normal range of motion.     Neurological: He is alert and oriented to person, place, and time. He has normal strength.   Skin: Skin is warm and dry.   Psychiatric: He has a normal mood and affect.         ED Course   Procedures  Labs Reviewed   COMPREHENSIVE METABOLIC PANEL - Abnormal; Notable for the following components:       Result Value    Glucose 117 (*)     Globulin 4.0 (*)     Albumin/Globulin Ratio 0.9 (*)      (*)     AST 45 (*)     All other components within normal limits   URINALYSIS, REFLEX TO URINE CULTURE - Abnormal; Notable for the following components:    Protein, UA 1+ (*)     Ketones, UA Trace (*)     Urobilinogen, UA 2.0 (*)     Mucous, UA Few (*)     All other components within normal limits   CBC WITH DIFFERENTIAL - Abnormal; Notable for the following components:    WBC 12.44 (*)     RBC 3.99 (*)     Hgb 12.0 (*)     Hct 34.7 (*)     MPV 10.8 (*)     Neut # 9.56 (*)     IG# 0.08 (*)     All other components within normal limits   PROTIME-INR - Normal   CBC W/ AUTO DIFFERENTIAL    Narrative:     The following orders were created for panel order CBC W/ AUTO DIFFERENTIAL.  Procedure                               Abnormality         Status                     ---------                               -----------         ------                     CBC with Differential[[id number]]       Abnormal            Final result                  Please view results for these tests on the individual orders.          Imaging Results              CT Abdomen Pelvis With IV Contrast NO Oral Contrast (Final result)  Result time 05/28/24 19:56:53      Final result by Angelika Rothman MD (05/28/24 19:56:53)                   Impression:      Findings compatible with metastatic rectal malignancy similar to recent PET-CT.      Electronically signed by: Angelika Rothman  Date:    05/28/2024  Time:    19:56               Narrative:    EXAMINATION:  CT ABDOMEN PELVIS WITH IV CONTRAST    CLINICAL HISTORY:  Abdominal pain, acute, nonlocalized;History of stage IV rectal cancer generalized abdominal discomfort and bloating;    TECHNIQUE:  Helically acquired images with axial, sagittal and coronal reformations were obtained from the lung bases to the pubic symphysis after the IV administration of contrast.    Automated tube current modulation, weight-based exposure dosing, and/or iterative reconstruction technique utilized to reach lowest reasonably achievable exposure rate.    DLP: Dose page did not transmit    COMPARISON:  PET-CT 04/29/2024    FINDINGS:  HEART: Normal in size. No pericardial effusion.    LUNG BASES: 1.4 cm right lower lobe pulmonary nodule.    LIVER: Multiple bilobar hypodense, variably calcified hepatic masses compatible with colorectal metastases.  Largest discrete lesion in the right lobe measures approximately 8.7 cm.    BILIARY: No calcified gallstones.    PANCREAS: No inflammatory change.    SPLEEN: Normal in size    ADRENALS: No mass.    KIDNEYS/URETERS: The kidneys enhance symmetrically.  No hydronephrosis.    GI TRACT/MESENTERY:  There is long segment rectal wall thickening and nodularity compatible with known rectal malignancy.  There is mural calcification associated with the lesion.  The appendix is normal. No evidence of bowel obstruction.    PERITONEUM: No free fluid.No free air.    LYMPH NODES: There are multiple calcified  abdominopelvic lymph nodes compatible with metastatic nodes.  Reference portal caval lymph node measures 2.4 cm short axis.    VASCULATURE: Aortoiliac atherosclerosis.    BLADDER: Normal appearance given degree of distention.    REPRODUCTIVE ORGANS: Normal as visualized.    SOFT TISSUES: Unremarkable.    BONES: Degenerative change at the lumbosacral junction.                                       Medications   iohexoL (OMNIPAQUE 350) injection 100 mL (100 mLs Intravenous Given 5/28/24 1939)     Medical Decision Making  Differential diagnosis include but are not limited to:  Constipation bowel obstruction metastatic disease cholelithiasis cholecystitis    Rectal exam does not show any occlusive lesions or impacted stool.  CT shows no evidence of obstruction.  Does have known metastases including deliver this maybe causing some of the discomfort.  Discussed constipation can also cause some pain in the upper abdomen right upper quadrant transverse colon region.  States that he often leans on a chair here with a is adamant imbalances himself with his upper abdomen I explained muscle soreness could be causing this as well but I suspect it is related to these liver metastases.  The constipation reports maybe from dehydration decreased fiber intake without having evidence of occlusion I would recommend he try increasing fiber increase hydration in his lactulose for few days.  He was oncologist or gastroenterologist may prefer he be on a low residue diet and I recommend he discuss this with them but explained there is an increased risk of costume patient with processed foods.  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 Decision-making details documented in ED Course.  Radiology: ordered and independent interpretation performed. Decision-making details documented in ED Course.    Risk  Prescription drug management.            Scribe Attestation:   Scribe #1: I performed the above scribed service and the documentation accurately describes the services I performed. I attest to the accuracy of the note.    Attending Attestation:           Physician Attestation for Scribe:  Physician Attestation Statement for Scribe #1: I, Abdirashid Jaramillo MD, reviewed documentation, as scribed by Amanda Anaya in my presence, and it is both accurate and complete.             ED Course as of 05/28/24 2139 Tue May 28, 2024   1833 Rectal exam has some fullness about the 5 o'clock position of the rectum.  Could be consistent with a history of cancer hemorrhoid is also a consideration.  No occlusive masses on exam no fecal impaction in the rectum on exam [LF]   1835 Patient was undergoing palliative chemotherapy for stage IV rectal cancer adenocarcinoma.  Abdomen is without significant distention on exam and does not have tenderness or guarding on exam.  The palpable distal rectal does not have occlusive mass on exam. [LF]   2048 Known rectal malignancy seen on imaging.  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 Provider    lactulose (CHRONULAC) 20 gram/30 mL Soln Take 30 mLs (20 g total) by mouth 2 (two) times daily. for 5 days 300 mL 5/28/2024 6/2/2024 Abdirashid Jaramillo MD          Follow-up Information       Follow up With Specialties Details  Why Contact Info    Mao Crook,  Internal Medicine, Hematology and Oncology Schedule an appointment as soon as possible for a visit   [address]  [address]  [facility] [place] [address]  [phone]      Renee Gipson MD Oncology, Hematology and Oncology Schedule an appointment as soon as possible for a visit   [address] [address] [facility].  [address]  [facility] [address]  [phone]      Deshaun Son MD Gastroenterology  Or a gastroenterologist of your choice if you continue to struggle with constipation [address].  [address]  [facility] [address]  [phone]               Abdirashid Jaramillo MD  05/28/24 2437

## 2024-05-28 NOTE — TELEPHONE ENCOUNTER
"Pt called and states that he is constipated. Last BM was on Friday. Pt states that a "bloody water that smells like rot" is coming out. States that it is bright red blood. He is concerned and states that this is why he was hospitalized a year ago. He has taken stool softeners. Please advise.  "

## 2024-05-30 ENCOUNTER — LAB VISIT (OUTPATIENT)
Dept: LAB | Facility: HOSPITAL | Age: 44
End: 2024-05-30
Attending: INTERNAL MEDICINE
Payer: COMMERCIAL

## 2024-05-30 ENCOUNTER — OFFICE VISIT (OUTPATIENT)
Dept: HEMATOLOGY/ONCOLOGY | Facility: CLINIC | Age: 44
End: 2024-05-30
Payer: COMMERCIAL

## 2024-05-30 VITALS
DIASTOLIC BLOOD PRESSURE: 74 MMHG | TEMPERATURE: 98 F | BODY MASS INDEX: 32.38 KG/M2 | SYSTOLIC BLOOD PRESSURE: 118 MMHG | HEIGHT: 69 IN | RESPIRATION RATE: 14 BRPM | HEART RATE: 63 BPM | WEIGHT: 218.63 LBS | OXYGEN SATURATION: 99 %

## 2024-05-30 DIAGNOSIS — C19 COLORECTAL CANCER, STAGE IV: Primary | ICD-10-CM

## 2024-05-30 DIAGNOSIS — C78.00 COLON CANCER METASTASIZED TO LUNG: ICD-10-CM

## 2024-05-30 DIAGNOSIS — C18.9 COLON CANCER METASTASIZED TO LUNG: ICD-10-CM

## 2024-05-30 DIAGNOSIS — C19 COLORECTAL CANCER, STAGE IV: ICD-10-CM

## 2024-05-30 LAB
ALBUMIN SERPL-MCNC: 3.7 G/DL (ref 3.5–5)
ALBUMIN/GLOB SERPL: 1 RATIO (ref 1.1–2)
ALP SERPL-CCNC: 189 UNIT/L (ref 40–150)
ALT SERPL-CCNC: 34 UNIT/L (ref 0–55)
ANION GAP SERPL CALC-SCNC: 9 MEQ/L
AST SERPL-CCNC: 52 UNIT/L (ref 5–34)
BASOPHILS # BLD AUTO: 0.04 X10(3)/MCL
BASOPHILS NFR BLD AUTO: 0.3 %
BILIRUB SERPL-MCNC: 0.9 MG/DL
BUN SERPL-MCNC: 10.2 MG/DL (ref 8.9–20.6)
CALCIUM SERPL-MCNC: 9.3 MG/DL (ref 8.4–10.2)
CHLORIDE SERPL-SCNC: 105 MMOL/L (ref 98–107)
CK SERPL-CCNC: 280 U/L (ref 30–200)
CO2 SERPL-SCNC: 24 MMOL/L (ref 22–29)
CREAT SERPL-MCNC: 0.88 MG/DL (ref 0.73–1.18)
CREAT/UREA NIT SERPL: 12
EOSINOPHIL # BLD AUTO: 0.47 X10(3)/MCL (ref 0–0.9)
EOSINOPHIL NFR BLD AUTO: 3.8 %
ERYTHROCYTE [DISTWIDTH] IN BLOOD BY AUTOMATED COUNT: 14.3 % (ref 11.5–17)
GFR SERPLBLD CREATININE-BSD FMLA CKD-EPI: >60 ML/MIN/1.73/M2
GLOBULIN SER-MCNC: 3.7 GM/DL (ref 2.4–3.5)
GLUCOSE SERPL-MCNC: 122 MG/DL (ref 74–100)
HCT VFR BLD AUTO: 38 % (ref 42–52)
HGB BLD-MCNC: 12.9 G/DL (ref 14–18)
IMM GRANULOCYTES # BLD AUTO: 0.02 X10(3)/MCL (ref 0–0.04)
IMM GRANULOCYTES NFR BLD AUTO: 0.2 %
LYMPHOCYTES # BLD AUTO: 1.94 X10(3)/MCL (ref 0.6–4.6)
LYMPHOCYTES NFR BLD AUTO: 15.7 %
MCH RBC QN AUTO: 29.6 PG (ref 27–31)
MCHC RBC AUTO-ENTMCNC: 33.9 G/DL (ref 33–36)
MCV RBC AUTO: 87.2 FL (ref 80–94)
MONOCYTES # BLD AUTO: 1.14 X10(3)/MCL (ref 0.1–1.3)
MONOCYTES NFR BLD AUTO: 9.2 %
NEUTROPHILS # BLD AUTO: 8.77 X10(3)/MCL (ref 2.1–9.2)
NEUTROPHILS NFR BLD AUTO: 70.8 %
PLATELET # BLD AUTO: 275 X10(3)/MCL (ref 130–400)
PMV BLD AUTO: 10.1 FL (ref 7.4–10.4)
POTASSIUM SERPL-SCNC: 4.3 MMOL/L (ref 3.5–5.1)
PROT SERPL-MCNC: 7.4 GM/DL (ref 6.4–8.3)
RBC # BLD AUTO: 4.36 X10(6)/MCL (ref 4.7–6.1)
SODIUM SERPL-SCNC: 138 MMOL/L (ref 136–145)
WBC # SPEC AUTO: 12.38 X10(3)/MCL (ref 4.5–11.5)

## 2024-05-30 PROCEDURE — 85025 COMPLETE CBC W/AUTO DIFF WBC: CPT

## 2024-05-30 PROCEDURE — 4010F ACE/ARB THERAPY RXD/TAKEN: CPT | Mod: CPTII,S$GLB,, | Performed by: INTERNAL MEDICINE

## 2024-05-30 PROCEDURE — 80053 COMPREHEN METABOLIC PANEL: CPT

## 2024-05-30 PROCEDURE — 82550 ASSAY OF CK (CPK): CPT

## 2024-05-30 PROCEDURE — 36415 COLL VENOUS BLD VENIPUNCTURE: CPT

## 2024-05-30 PROCEDURE — 3074F SYST BP LT 130 MM HG: CPT | Mod: CPTII,S$GLB,, | Performed by: INTERNAL MEDICINE

## 2024-05-30 PROCEDURE — 3008F BODY MASS INDEX DOCD: CPT | Mod: CPTII,S$GLB,, | Performed by: INTERNAL MEDICINE

## 2024-05-30 PROCEDURE — 99999 PR PBB SHADOW E&M-EST. PATIENT-LVL IV: CPT | Mod: PBBFAC,,, | Performed by: INTERNAL MEDICINE

## 2024-05-30 PROCEDURE — 99215 OFFICE O/P EST HI 40 MIN: CPT | Mod: S$GLB,,, | Performed by: INTERNAL MEDICINE

## 2024-05-30 PROCEDURE — 3078F DIAST BP <80 MM HG: CPT | Mod: CPTII,S$GLB,, | Performed by: INTERNAL MEDICINE

## 2024-05-30 PROCEDURE — 1159F MED LIST DOCD IN RCRD: CPT | Mod: CPTII,S$GLB,, | Performed by: INTERNAL MEDICINE

## 2024-05-30 PROCEDURE — 1160F RVW MEDS BY RX/DR IN RCRD: CPT | Mod: CPTII,S$GLB,, | Performed by: INTERNAL MEDICINE

## 2024-06-04 ENCOUNTER — TELEPHONE (OUTPATIENT)
Dept: HEMATOLOGY/ONCOLOGY | Facility: CLINIC | Age: 44
End: 2024-06-04
Payer: COMMERCIAL

## 2024-06-04 DIAGNOSIS — C19 COLORECTAL CANCER, STAGE IV: Primary | ICD-10-CM

## 2024-06-04 DIAGNOSIS — C18.9 COLON CANCER METASTASIZED TO LUNG: ICD-10-CM

## 2024-06-04 DIAGNOSIS — C78.00 COLON CANCER METASTASIZED TO LUNG: ICD-10-CM

## 2024-06-04 PROBLEM — E66.9 OBESITY (BMI 30.0-34.9): Status: ACTIVE | Noted: 2022-01-24

## 2024-06-04 PROBLEM — E66.811 OBESITY (BMI 30.0-34.9): Status: ACTIVE | Noted: 2022-01-24

## 2024-06-04 PROBLEM — I10 ESSENTIAL HYPERTENSION: Status: ACTIVE | Noted: 2022-01-24

## 2024-06-04 PROBLEM — K62.89 RECTAL MASS: Status: ACTIVE | Noted: 2023-02-07

## 2024-06-04 PROBLEM — G47.33 OSA ON CPAP: Status: ACTIVE | Noted: 2022-11-21

## 2024-06-04 PROBLEM — F33.9 DEPRESSION, RECURRENT: Status: ACTIVE | Noted: 2023-03-06

## 2024-06-04 PROBLEM — R59.0 MEDIASTINAL ADENOPATHY: Status: ACTIVE | Noted: 2023-02-08

## 2024-06-04 RX ORDER — ALPRAZOLAM 0.25 MG/1
0.25 TABLET ORAL 2 TIMES DAILY PRN
Qty: 60 TABLET | Refills: 1 | Status: SHIPPED | OUTPATIENT
Start: 2024-06-04 | End: 2025-06-04

## 2024-06-04 NOTE — TELEPHONE ENCOUNTER
"Pt called and states that his prior med team had prescribed Xanax for him. States that he tried to "macho through" the anxiety and pain but has taken the Xanax and found that it really helped. He would like to know if we can prescribe some for him. Dosage was 0.25 mg.  "

## 2024-06-04 NOTE — TELEPHONE ENCOUNTER
I do not think Dr. Gipson would mind but I would send the proposal to her since I have not seen him yet. I see him next week but not sure if he wants to wait until then.

## 2024-06-04 NOTE — PROGRESS NOTES
Subjective:       Patient ID: Owen Swanson is a 44 y.o. male.    Rectal Cancer Stage IV--Diagnosed 23  Biopsy/pathology:   1. Colonoscopy 23--large fungating rectal mass 5-10cm from anal verge to approximately 20cm, biopsy with well differentiated colorectal adenocarcinoma.   2. Bronchoscopy with biopsy of endobronchial mass RUL biopsy done 3/16/23--metastatic poorly differentiated colonic adenocarcinoma. Foundation One with PD-L1 0%, ALK rearrangement, KRAS G12V, AKT2 amplification, APCV, FBXW7, CCND3 amplification, TP53, VEGFA amplification, TMB 8, MSI-high not detected, elevated tumor fraction.   3. Liquid Biopsy Guardant 360 done 24--KRAS G12V, APC , TP53 R248W, MSI high not detected, NRAS, BRAF, Her2 and NTRK negative.  Imagin. PET/CT 24--interval enlargement and increased metabolic activity of primary rectal mass, c/w local disease progression, stable hypermetabolic hepatic metastatic disease, grossly stable infiltrating tumor in RUL bronchus, suspected subtle interval enlargement of subcentimeter nodule in TIGRE and RLL.   2. CT C/A/P w/ contrast done at [facility] 3/18/24--Similar to slight increase in size of left upper and right lower lobe pulmonary nodules. Grossly similar right upper lobe peribronchial lesion and nodularity, similar to slight increase in size of conglomerate maria isabel hepatic and peripancreatic lymphadenopathy, grossly similar metastatic liver lesions.   3. PET/CT 24--Findings concerning for progression of primary rectal mass and metastatic disease.   4. CT A/P done 24--long segment rectal wall thickening and nodularity compatible with known rectal malignancy, multiple calcified abdominopelvic lymph nodes compatible with metastatic nodes. Reference portal caval lymph node measures 2.4 cm short axis, Multiple bilobar hypodense, variably calcified hepatic masses compatible with colorectal metastases. Largest discrete lesion in the right lobe measures  approximately 8.7 cm, findings similar to prior.     Treatment history:  FOLFOX/Avastin 2/22/23 followed by maintenance.  Progression in 11/2023.   FOLFIRI/Avastin 11/2023--4/29/24 (stopped due to progression).    Current treatment plan:   Alectinib 600mg PO BID started 5/22/24.     Chief Complaint: Abdominal Pain, Joint Pain, and Pain    HPI  Patient presents for follow-up of Stage IV rectal cancer. He started on the Alecensa on 5/22/24. So far, he reports having fatigue and myalgias. He continues working and admits he has days were he feels really good and then other days where he is very fatigued. He is no longer taking the Lactulose. Only takes stool softeners as needed. Mentions he does have a lot of rectal pressure/fullness which continues. He continues with intermittent right sided abdominal pain from known liver metastases for which he is taking pain medication and it helps. Also has Xanax for intermittent anxiety which is working. No other problems reported today.     Past Medical History:   Diagnosis Date    Anxiety     Hypertension       History reviewed. No pertinent surgical history.  Family History   Problem Relation Name Age of Onset    Hypertension Mother      Coronary artery disease Father      Hypertension Father      Diabetes Father       Social History     Socioeconomic History    Marital status:    Tobacco Use    Smoking status: Former     Types: Cigarettes    Smokeless tobacco: Never   Substance and Sexual Activity    Alcohol use: Not Currently    Drug use: Yes     Types: Marijuana       Review of patient's allergies indicates:  No Known Allergies   Current Outpatient Medications on File Prior to Visit   Medication Sig Dispense Refill    albuterol (PROVENTIL) 2.5 mg /3 mL (0.083 %) nebulizer solution Inhale 2.5 mg into the lungs every 6 (six) hours as needed.      alectinib (ALECENSA) 150 mg Cap Take 4 capsules (600 mg total) by mouth twice daily with food. 240 capsule 3    ALPRAZolam  (XANAX) 0.25 MG tablet Take 1 tablet (0.25 mg total) by mouth 2 (two) times daily as needed for Anxiety. 60 tablet 1    ALPRAZolam (XANAX) 0.25 MG tablet Take 1 tablet (0.25 mg total) by mouth 2 (two) times daily as needed for Anxiety. 60 tablet 1    amlodipine-benazepril 10-20mg (LOTREL) 10-20 mg per capsule Take 1 capsule by mouth once daily.      ascorbic acid, vitamin C, (VITAMIN C) 1000 MG tablet Take 4 tablets by mouth every evening.      cholecalciferol, vitamin D3, (VITAMIN D3) 25 mcg (1,000 unit) capsule Take 1 capsule by mouth every evening.      cyanocobalamin (VITAMIN B-12) 100 MCG tablet Take 1 tablet by mouth every evening.      fluoride, sodium, (PREVIDENT 5000 BOOSTER PLUS) 1.1 % Pste       gabapentin (NEURONTIN) 300 MG capsule Start with taking 300 mg once nightly x 3 days and then increase to 300 mg twice daily x 3 days and then 300 mg three times a day thereafter.      magnesium 200 mg Tab Take 2 tablets by mouth every evening.      omeprazole (PRILOSEC) 20 MG capsule Take 1 capsule by mouth every morning.      ondansetron (ZOFRAN) 4 MG tablet Take 4 mg by mouth every 8 (eight) hours as needed.      promethazine (PHENERGAN) 25 MG tablet Take 25 mg by mouth every 6 (six) hours as needed.      traMADoL (ULTRAM) 50 mg tablet Take 1 tablet (50 mg total) by mouth every 6 (six) hours as needed for Pain. 90 tablet 3    zinc acetate 50 mg (zinc) Cap Take 1 tablet by mouth every evening.       No current facility-administered medications on file prior to visit.      Review of Systems   Constitutional:  Positive for fatigue. Negative for appetite change and unexpected weight change.   HENT:  Negative for mouth sores.    Eyes:  Negative for visual disturbance.   Respiratory:  Negative for cough and shortness of breath.    Cardiovascular:  Negative for chest pain.   Gastrointestinal:  Positive for abdominal pain and constipation. Negative for diarrhea.   Genitourinary:  Positive for frequency.  "  Musculoskeletal:  Positive for myalgias. Negative for back pain.   Neurological:  Negative for headaches.   Hematological:  Negative for adenopathy.   Psychiatric/Behavioral:  The patient is nervous/anxious.          Vitals:    06/12/24 0843   BP: 125/73   Pulse: 73   Resp: 14   Temp: 98.3 °F (36.8 °C)   TempSrc: Oral   SpO2: 97%   Weight: 101.2 kg (223 lb 1.6 oz)   Height: 5' 9" (1.753 m)       Physical Exam  Constitutional:       General: He is awake.      Appearance: Normal appearance. He is normal weight.   HENT:      Head: Normocephalic and atraumatic.      Nose: Nose normal.      Mouth/Throat:      Mouth: Mucous membranes are moist.   Eyes:      General: Vision grossly intact.      Extraocular Movements: Extraocular movements intact.      Conjunctiva/sclera: Conjunctivae normal.   Cardiovascular:      Rate and Rhythm: Normal rate and regular rhythm.      Heart sounds: Normal heart sounds.   Pulmonary:      Effort: Pulmonary effort is normal.      Breath sounds: Normal breath sounds.   Chest:      Comments: Left chest wall mediport in place  Abdominal:      General: Abdomen is protuberant. Bowel sounds are normal. There is no distension.      Palpations: Abdomen is soft.      Tenderness: There is no abdominal tenderness.   Musculoskeletal:      Cervical back: Normal range of motion and neck supple.      Right lower leg: No edema.      Left lower leg: No edema.   Lymphadenopathy:      Cervical: No cervical adenopathy.      Upper Body:      Right upper body: No supraclavicular adenopathy.      Left upper body: No supraclavicular adenopathy.   Skin:     General: Skin is warm.   Neurological:      Mental Status: He is alert and oriented to person, place, and time.      Motor: Motor function is intact.   Psychiatric:         Mood and Affect: Mood normal.         Speech: Speech normal.         Behavior: Behavior is cooperative.         Judgment: Judgment normal.       Lab Visit on 06/12/2024   Component Date Value " Ref Range Status    WBC 06/12/2024 13.33 (H)  4.50 - 11.50 x10(3)/mcL Final    RBC 06/12/2024 3.39 (L)  4.70 - 6.10 x10(6)/mcL Final    Hgb 06/12/2024 10.5 (L)  14.0 - 18.0 g/dL Final    Hct 06/12/2024 30.1 (L)  42.0 - 52.0 % Final    MCV 06/12/2024 88.8  80.0 - 94.0 fL Final    MCH 06/12/2024 31.0  27.0 - 31.0 pg Final    MCHC 06/12/2024 34.9  33.0 - 36.0 g/dL Final    RDW 06/12/2024 15.9  11.5 - 17.0 % Final    Platelet 06/12/2024 307  130 - 400 x10(3)/mcL Final    MPV 06/12/2024 10.2  7.4 - 10.4 fL Final    Neut % 06/12/2024 74.9  % Final    Lymph % 06/12/2024 12.5  % Final    Mono % 06/12/2024 7.7  % Final    Eos % 06/12/2024 4.0  % Final    Basophil % 06/12/2024 0.4  % Final    Lymph # 06/12/2024 1.67  0.6 - 4.6 x10(3)/mcL Final    Neut # 06/12/2024 9.99 (H)  2.1 - 9.2 x10(3)/mcL Final    Mono # 06/12/2024 1.02  0.1 - 1.3 x10(3)/mcL Final    Eos # 06/12/2024 0.53  0 - 0.9 x10(3)/mcL Final    Baso # 06/12/2024 0.05  <=0.2 x10(3)/mcL Final    IG# 06/12/2024 0.07 (H)  0 - 0.04 x10(3)/mcL Final    IG% 06/12/2024 0.5  % Final         Assessment:       1. Colorectal cancer, stage IV    2. Colon cancer metastasized to lung        Plan:       Patient with Stage IV rectal cancer, lung, liver, abdominal mariya metastases diagnosed in 2/2023. KRAS mutated.  Patient has failed FOLFOX avastin and FOLFIRI avastin.  Most Recent PET from 4/29/24 shows disease progression and CEA rising.    In review of previous Foundation One testing, noted to have an ALK rearrangement.   I did a literature review and there have been documented responses in colon cancers treatment with ALK inhibitors.   Case presented at [facility] clinical trials tumor board. No current clinical trials available, but treatment recommended with Alectinib.   Case will also be presented at molecular tumor board on 6/4/24.    Dr. Gipson discussed conventional treatment with 3rd line Stivarga vs. Lonsurf which have not historically had good outcomes. Also discussed  incurable nature of disease and continued palliative goals of treatment.  Would favor a more aggressive approach to treatment given his excellent functional status and young age. She did not think that Keytruda or immunotherapy would be effective given JESUS.     Patient started Alectinib 600mg PO BID on 5/22/24.  ER visit for constipation on 5/28/24. No obstruction, prescribed lactulose. States he was told most of his symptoms are likely due to rectal pressure from the mass. No longer taking anything for constipation.   He is tolerating Alecensa okay, but does have some fatigue and myalgias.  CBC diff today with mildly elevated WBC, suspect this is from his cancer. His anemia is worse, Hgb of 10.5 g/dL.    Will f/u CMP, CK and CEA.   Continue same dose. If myalgias worsen, could consider dose reduction.     Recommendations for monitoring are LFT's Q2 weeks X 3 months then monthly.  CK every 2 weeks X 4 weeks then as clinically indicated.  F/u in 2 weeks with repeat CBC, CMP, CK and will repeat CEA.   Plan to repeat CT after 3 months of treatment in 8/2024.    Continue Tramadol PRN for RUQ pain.  Start mediport flushes this week.   Patient has no FH of any cancer, may need genetic testing due to the APC mutation.    All questions answered at this time.      Angel Bailey TITUS

## 2024-06-12 ENCOUNTER — OFFICE VISIT (OUTPATIENT)
Dept: HEMATOLOGY/ONCOLOGY | Facility: CLINIC | Age: 44
End: 2024-06-12
Payer: COMMERCIAL

## 2024-06-12 ENCOUNTER — LAB VISIT (OUTPATIENT)
Dept: LAB | Facility: HOSPITAL | Age: 44
End: 2024-06-12
Attending: INTERNAL MEDICINE
Payer: COMMERCIAL

## 2024-06-12 VITALS
DIASTOLIC BLOOD PRESSURE: 73 MMHG | OXYGEN SATURATION: 97 % | HEIGHT: 69 IN | SYSTOLIC BLOOD PRESSURE: 125 MMHG | TEMPERATURE: 98 F | HEART RATE: 73 BPM | BODY MASS INDEX: 33.05 KG/M2 | WEIGHT: 223.13 LBS | RESPIRATION RATE: 14 BRPM

## 2024-06-12 DIAGNOSIS — C78.00 COLON CANCER METASTASIZED TO LUNG: ICD-10-CM

## 2024-06-12 DIAGNOSIS — C19 COLORECTAL CANCER, STAGE IV: ICD-10-CM

## 2024-06-12 DIAGNOSIS — C18.9 COLON CANCER METASTASIZED TO LUNG: ICD-10-CM

## 2024-06-12 DIAGNOSIS — C19 COLORECTAL CANCER, STAGE IV: Primary | ICD-10-CM

## 2024-06-12 LAB
ALBUMIN SERPL-MCNC: 3.3 G/DL (ref 3.5–5)
ALBUMIN/GLOB SERPL: 0.9 RATIO (ref 1.1–2)
ALP SERPL-CCNC: 131 UNIT/L (ref 40–150)
ALT SERPL-CCNC: 24 UNIT/L (ref 0–55)
ANION GAP SERPL CALC-SCNC: 9 MEQ/L
AST SERPL-CCNC: 40 UNIT/L (ref 5–34)
BASOPHILS # BLD AUTO: 0.05 X10(3)/MCL
BASOPHILS NFR BLD AUTO: 0.4 %
BILIRUB SERPL-MCNC: 1.3 MG/DL
BUN SERPL-MCNC: 14.9 MG/DL (ref 8.9–20.6)
CALCIUM SERPL-MCNC: 8.9 MG/DL (ref 8.4–10.2)
CEA SERPL-MCNC: 1131.53 NG/ML (ref 0–3)
CHLORIDE SERPL-SCNC: 107 MMOL/L (ref 98–107)
CK SERPL-CCNC: 84 U/L (ref 30–200)
CO2 SERPL-SCNC: 25 MMOL/L (ref 22–29)
CREAT SERPL-MCNC: 0.92 MG/DL (ref 0.73–1.18)
CREAT/UREA NIT SERPL: 16
EOSINOPHIL # BLD AUTO: 0.53 X10(3)/MCL (ref 0–0.9)
EOSINOPHIL NFR BLD AUTO: 4 %
ERYTHROCYTE [DISTWIDTH] IN BLOOD BY AUTOMATED COUNT: 15.9 % (ref 11.5–17)
GFR SERPLBLD CREATININE-BSD FMLA CKD-EPI: >60 ML/MIN/1.73/M2
GLOBULIN SER-MCNC: 3.6 GM/DL (ref 2.4–3.5)
GLUCOSE SERPL-MCNC: 126 MG/DL (ref 74–100)
HCT VFR BLD AUTO: 30.1 % (ref 42–52)
HGB BLD-MCNC: 10.5 G/DL (ref 14–18)
IMM GRANULOCYTES # BLD AUTO: 0.07 X10(3)/MCL (ref 0–0.04)
IMM GRANULOCYTES NFR BLD AUTO: 0.5 %
LYMPHOCYTES # BLD AUTO: 1.67 X10(3)/MCL (ref 0.6–4.6)
LYMPHOCYTES NFR BLD AUTO: 12.5 %
MCH RBC QN AUTO: 31 PG (ref 27–31)
MCHC RBC AUTO-ENTMCNC: 34.9 G/DL (ref 33–36)
MCV RBC AUTO: 88.8 FL (ref 80–94)
MONOCYTES # BLD AUTO: 1.02 X10(3)/MCL (ref 0.1–1.3)
MONOCYTES NFR BLD AUTO: 7.7 %
NEUTROPHILS # BLD AUTO: 9.99 X10(3)/MCL (ref 2.1–9.2)
NEUTROPHILS NFR BLD AUTO: 74.9 %
PLATELET # BLD AUTO: 307 X10(3)/MCL (ref 130–400)
PMV BLD AUTO: 10.2 FL (ref 7.4–10.4)
POTASSIUM SERPL-SCNC: 4.2 MMOL/L (ref 3.5–5.1)
PROT SERPL-MCNC: 6.9 GM/DL (ref 6.4–8.3)
RBC # BLD AUTO: 3.39 X10(6)/MCL (ref 4.7–6.1)
SODIUM SERPL-SCNC: 141 MMOL/L (ref 136–145)
WBC # SPEC AUTO: 13.33 X10(3)/MCL (ref 4.5–11.5)

## 2024-06-12 PROCEDURE — 3074F SYST BP LT 130 MM HG: CPT | Mod: CPTII,S$GLB,, | Performed by: NURSE PRACTITIONER

## 2024-06-12 PROCEDURE — 4010F ACE/ARB THERAPY RXD/TAKEN: CPT | Mod: CPTII,S$GLB,, | Performed by: NURSE PRACTITIONER

## 2024-06-12 PROCEDURE — 80053 COMPREHEN METABOLIC PANEL: CPT

## 2024-06-12 PROCEDURE — 99215 OFFICE O/P EST HI 40 MIN: CPT | Mod: S$GLB,,, | Performed by: NURSE PRACTITIONER

## 2024-06-12 PROCEDURE — 85025 COMPLETE CBC W/AUTO DIFF WBC: CPT

## 2024-06-12 PROCEDURE — 99999 PR PBB SHADOW E&M-EST. PATIENT-LVL V: CPT | Mod: PBBFAC,,, | Performed by: NURSE PRACTITIONER

## 2024-06-12 PROCEDURE — 36415 COLL VENOUS BLD VENIPUNCTURE: CPT

## 2024-06-12 PROCEDURE — 3078F DIAST BP <80 MM HG: CPT | Mod: CPTII,S$GLB,, | Performed by: NURSE PRACTITIONER

## 2024-06-12 PROCEDURE — 82550 ASSAY OF CK (CPK): CPT

## 2024-06-12 PROCEDURE — 1159F MED LIST DOCD IN RCRD: CPT | Mod: CPTII,S$GLB,, | Performed by: NURSE PRACTITIONER

## 2024-06-12 PROCEDURE — 82378 CARCINOEMBRYONIC ANTIGEN: CPT

## 2024-06-12 PROCEDURE — 3008F BODY MASS INDEX DOCD: CPT | Mod: CPTII,S$GLB,, | Performed by: NURSE PRACTITIONER

## 2024-06-12 PROCEDURE — 1160F RVW MEDS BY RX/DR IN RCRD: CPT | Mod: CPTII,S$GLB,, | Performed by: NURSE PRACTITIONER

## 2024-06-14 ENCOUNTER — INFUSION (OUTPATIENT)
Dept: INFUSION THERAPY | Facility: HOSPITAL | Age: 44
End: 2024-06-14
Attending: INTERNAL MEDICINE
Payer: COMMERCIAL

## 2024-06-14 VITALS
SYSTOLIC BLOOD PRESSURE: 115 MMHG | OXYGEN SATURATION: 97 % | TEMPERATURE: 99 F | DIASTOLIC BLOOD PRESSURE: 66 MMHG | HEART RATE: 71 BPM

## 2024-06-14 DIAGNOSIS — C78.00 COLON CANCER METASTASIZED TO LUNG: Primary | ICD-10-CM

## 2024-06-14 DIAGNOSIS — C18.9 COLON CANCER METASTASIZED TO LUNG: Primary | ICD-10-CM

## 2024-06-14 PROCEDURE — 63600175 PHARM REV CODE 636 W HCPCS: Performed by: INTERNAL MEDICINE

## 2024-06-14 PROCEDURE — A4216 STERILE WATER/SALINE, 10 ML: HCPCS | Performed by: INTERNAL MEDICINE

## 2024-06-14 PROCEDURE — 25000003 PHARM REV CODE 250: Performed by: INTERNAL MEDICINE

## 2024-06-14 PROCEDURE — 96523 IRRIG DRUG DELIVERY DEVICE: CPT

## 2024-06-14 RX ORDER — SODIUM CHLORIDE 0.9 % (FLUSH) 0.9 %
10 SYRINGE (ML) INJECTION
OUTPATIENT
Start: 2024-09-06

## 2024-06-14 RX ORDER — HEPARIN 100 UNIT/ML
500 SYRINGE INTRAVENOUS
OUTPATIENT
Start: 2024-09-06

## 2024-06-14 RX ORDER — HEPARIN 100 UNIT/ML
500 SYRINGE INTRAVENOUS
Status: DISCONTINUED | OUTPATIENT
Start: 2024-06-14 | End: 2024-06-14 | Stop reason: HOSPADM

## 2024-06-14 RX ORDER — SODIUM CHLORIDE 0.9 % (FLUSH) 0.9 %
10 SYRINGE (ML) INJECTION
Status: DISCONTINUED | OUTPATIENT
Start: 2024-06-14 | End: 2024-06-14 | Stop reason: HOSPADM

## 2024-06-14 RX ADMIN — Medication 10 ML: at 08:06

## 2024-06-14 RX ADMIN — HEPARIN 500 UNITS: 100 SYRINGE at 08:06

## 2024-06-25 ENCOUNTER — TELEPHONE (OUTPATIENT)
Dept: HEMATOLOGY/ONCOLOGY | Facility: CLINIC | Age: 44
End: 2024-06-25
Payer: COMMERCIAL

## 2024-06-25 NOTE — PROGRESS NOTES
Subjective:       Patient ID: Owen Swanson is a 44 y.o. male.    Rectal Cancer Stage IV--Diagnosed 23  Biopsy/pathology:   1. Colonoscopy 23--large fungating rectal mass 5-10cm from anal verge to approximately 20cm, biopsy with well differentiated colorectal adenocarcinoma.   2. Bronchoscopy with biopsy of endobronchial mass RUL biopsy done 3/16/23--metastatic poorly differentiated colonic adenocarcinoma. Foundation One with PD-L1 0%, ALK rearrangement, KRAS G12V, AKT2 amplification, APCV, FBXW7, CCND3 amplification, TP53, VEGFA amplification, TMB 8, MSI-high not detected, elevated tumor fraction.   3. Liquid Biopsy Guardant 360 done 24--KRAS G12V, APC , TP53 R248W, MSI high not detected, NRAS, BRAF, Her2 and NTRK negative.  Imagin. PET/CT 24--interval enlargement and increased metabolic activity of primary rectal mass, c/w local disease progression, stable hypermetabolic hepatic metastatic disease, grossly stable infiltrating tumor in RUL bronchus, suspected subtle interval enlargement of subcentimeter nodule in TIGRE and RLL.   2. CT C/A/P w/ contrast done at [facility] 3/18/24--Similar to slight increase in size of left upper and right lower lobe pulmonary nodules. Grossly similar right upper lobe peribronchial lesion and nodularity, similar to slight increase in size of conglomerate maria isabel hepatic and peripancreatic lymphadenopathy, grossly similar metastatic liver lesions.   3. PET/CT 24--Findings concerning for progression of primary rectal mass and metastatic disease.   4. CT A/P done 24--long segment rectal wall thickening and nodularity compatible with known rectal malignancy, multiple calcified abdominopelvic lymph nodes compatible with metastatic nodes. Reference portal caval lymph node measures 2.4 cm short axis, Multiple bilobar hypodense, variably calcified hepatic masses compatible with colorectal metastases. Largest discrete lesion in the right lobe measures  approximately 8.7 cm, findings similar to prior.     Treatment history:  FOLFOX/Avastin 2/22/23 followed by maintenance.  Progression in 11/2023.   FOLFIRI/Avastin 11/2023--4/29/24 (stopped due to progression).    Current treatment plan:   Alectinib 600mg PO BID started 5/22/24.     Chief Complaint: Follow-up (No new c/o today)    HPI  Patient presents for follow-up of Stage IV rectal cancer. He started on the Alecensa on 5/22/24. So far, he reports having fatigue and myalgias. He continues working and admits he has days were he feels really good and then other days where he is very fatigued. Mentions today that the fatigue is getting worse. After he gets off of work, he has no energy to do anything at home. States he notices occasional rectal bleeding with straining but no dark stools. His bowels are moving. He is no longer taking the Lactulose. Only takes stool softeners as needed. Continues with rectal pressure/fullness and intermittent right sided abdominal pain from known liver metastases. Pain is controlled with medications. Also has Xanax for intermittent anxiety which is working. No other problems reported today.     Past Medical History:   Diagnosis Date    Anxiety     Hypertension       History reviewed. No pertinent surgical history.  Family History   Problem Relation Name Age of Onset    Hypertension Mother      Coronary artery disease Father      Hypertension Father      Diabetes Father       Social History     Socioeconomic History    Marital status:    Tobacco Use    Smoking status: Former     Types: Cigarettes    Smokeless tobacco: Never   Substance and Sexual Activity    Alcohol use: Not Currently    Drug use: Yes     Types: Marijuana       Review of patient's allergies indicates:  No Known Allergies   Current Outpatient Medications on File Prior to Visit   Medication Sig Dispense Refill    albuterol (PROVENTIL) 2.5 mg /3 mL (0.083 %) nebulizer solution Inhale 2.5 mg into the lungs every 6  (six) hours as needed.      alectinib (ALECENSA) 150 mg Cap Take 4 capsules (600 mg total) by mouth twice daily with food. 240 capsule 3    ALPRAZolam (XANAX) 0.25 MG tablet Take 1 tablet (0.25 mg total) by mouth 2 (two) times daily as needed for Anxiety. 60 tablet 1    ALPRAZolam (XANAX) 0.25 MG tablet Take 1 tablet (0.25 mg total) by mouth 2 (two) times daily as needed for Anxiety. 60 tablet 1    amlodipine-benazepril 10-20mg (LOTREL) 10-20 mg per capsule Take 1 capsule by mouth once daily.      ascorbic acid, vitamin C, (VITAMIN C) 1000 MG tablet Take 4 tablets by mouth every evening.      cholecalciferol, vitamin D3, (VITAMIN D3) 25 mcg (1,000 unit) capsule Take 1 capsule by mouth every evening.      cyanocobalamin (VITAMIN B-12) 100 MCG tablet Take 1 tablet by mouth every evening.      fluoride, sodium, (PREVIDENT 5000 BOOSTER PLUS) 1.1 % Pste       gabapentin (NEURONTIN) 300 MG capsule Start with taking 300 mg once nightly x 3 days and then increase to 300 mg twice daily x 3 days and then 300 mg three times a day thereafter.      magnesium 200 mg Tab Take 2 tablets by mouth every evening.      omeprazole (PRILOSEC) 20 MG capsule Take 1 capsule by mouth every morning.      ondansetron (ZOFRAN) 4 MG tablet Take 4 mg by mouth every 8 (eight) hours as needed.      promethazine (PHENERGAN) 25 MG tablet Take 25 mg by mouth every 6 (six) hours as needed.      traMADoL (ULTRAM) 50 mg tablet Take 1 tablet (50 mg total) by mouth every 6 (six) hours as needed for Pain. 90 tablet 3    zinc acetate 50 mg (zinc) Cap Take 1 tablet by mouth every evening.       No current facility-administered medications on file prior to visit.      Review of Systems   Constitutional:  Positive for fatigue and unexpected weight change (weight fluctuates). Negative for appetite change.   HENT:  Negative for mouth sores.    Eyes:  Negative for visual disturbance.   Respiratory:  Negative for cough and shortness of breath.    Cardiovascular:   "Negative for chest pain.   Gastrointestinal:  Positive for abdominal pain and constipation. Negative for diarrhea.   Genitourinary:  Positive for frequency.   Musculoskeletal:  Positive for myalgias. Negative for back pain.   Neurological:  Negative for headaches.   Hematological:  Negative for adenopathy.   Psychiatric/Behavioral:  The patient is nervous/anxious.          Vitals:    07/01/24 1323   BP: 120/72   Pulse: 73   Temp: 98.4 °F (36.9 °C)   SpO2: 98%   Weight: 98 kg (216 lb)   Height: 5' 9" (1.753 m)     Physical Exam  Constitutional:       General: He is awake.      Appearance: Normal appearance. He is normal weight.   HENT:      Head: Normocephalic and atraumatic.      Nose: Nose normal.      Mouth/Throat:      Mouth: Mucous membranes are moist.   Eyes:      General: Vision grossly intact.      Extraocular Movements: Extraocular movements intact.      Conjunctiva/sclera: Conjunctivae normal.   Cardiovascular:      Rate and Rhythm: Normal rate and regular rhythm.      Heart sounds: Normal heart sounds.   Pulmonary:      Effort: Pulmonary effort is normal.      Breath sounds: Normal breath sounds.   Chest:      Comments: Left chest wall mediport in place  Abdominal:      General: Abdomen is protuberant. Bowel sounds are normal. There is no distension.      Palpations: Abdomen is soft.      Tenderness: There is no abdominal tenderness.   Musculoskeletal:      Cervical back: Normal range of motion and neck supple.      Right lower leg: No edema.      Left lower leg: No edema.   Lymphadenopathy:      Cervical: No cervical adenopathy.      Upper Body:      Right upper body: No supraclavicular adenopathy.      Left upper body: No supraclavicular adenopathy.   Skin:     General: Skin is warm.   Neurological:      Mental Status: He is alert and oriented to person, place, and time.      Motor: Motor function is intact.   Psychiatric:         Mood and Affect: Mood normal.         Speech: Speech normal.         " Behavior: Behavior is cooperative.         Judgment: Judgment normal.       Lab Visit on 07/01/2024   Component Date Value Ref Range Status    WBC 07/01/2024 15.32 (H)  4.50 - 11.50 x10(3)/mcL Final    RBC 07/01/2024 2.70 (L)  4.70 - 6.10 x10(6)/mcL Final    Hgb 07/01/2024 8.4 (L)  14.0 - 18.0 g/dL Final    Hct 07/01/2024 25.0 (L)  42.0 - 52.0 % Final    MCV 07/01/2024 92.6  80.0 - 94.0 fL Final    MCH 07/01/2024 31.1 (H)  27.0 - 31.0 pg Final    MCHC 07/01/2024 33.6  33.0 - 36.0 g/dL Final    RDW 07/01/2024 18.1 (H)  11.5 - 17.0 % Final    Platelet 07/01/2024 375  130 - 400 x10(3)/mcL Final    MPV 07/01/2024 9.8  7.4 - 10.4 fL Final    Neut % 07/01/2024 76.6  % Final    Lymph % 07/01/2024 11.8  % Final    Mono % 07/01/2024 7.7  % Final    Eos % 07/01/2024 2.9  % Final    Basophil % 07/01/2024 0.5  % Final    Lymph # 07/01/2024 1.81  0.6 - 4.6 x10(3)/mcL Final    Neut # 07/01/2024 11.74 (H)  2.1 - 9.2 x10(3)/mcL Final    Mono # 07/01/2024 1.18  0.1 - 1.3 x10(3)/mcL Final    Eos # 07/01/2024 0.44  0 - 0.9 x10(3)/mcL Final    Baso # 07/01/2024 0.07  <=0.2 x10(3)/mcL Final    IG# 07/01/2024 0.08 (H)  0 - 0.04 x10(3)/mcL Final    IG% 07/01/2024 0.5  % Final         Assessment:       1. Colorectal cancer, stage IV    2. Colon cancer metastasized to lung    3. Anemia due to antineoplastic chemotherapy        Plan:       Patient with Stage IV rectal cancer, lung, liver, abdominal mariya metastases diagnosed in 2/2023. KRAS mutated.  Patient has failed FOLFOX avastin and FOLFIRI avastin.  Most Recent PET from 4/29/24 shows disease progression and CEA rising.    In review of previous Foundation One testing, noted to have an ALK rearrangement.   I did a literature review and there have been documented responses in colon cancers treatment with ALK inhibitors.   Case presented at [facility] clinical trials tumor board. No current clinical trials available, but treatment recommended with Alectinib.   Case will also be presented at  molecular tumor board on 6/4/24.    Dr. Gipson discussed conventional treatment with 3rd line Stivarga vs. Lonsurf which have not historically had good outcomes. Also discussed incurable nature of disease and continued palliative goals of treatment.  Would favor a more aggressive approach to treatment given his excellent functional status and young age. She did not think that Keytruda or immunotherapy would be effective given JESUS.     Patient started Alectinib 600mg PO BID on 5/22/24.  ER visit for constipation on 5/28/24. No obstruction, prescribed lactulose. States he was told most of his symptoms are likely due to rectal pressure from the mass. No longer taking anything for constipation.   He is tolerating Alecensa okay, but does have increasing fatigue and myalgias.  CBC diff today with elevated WBC, suspect this is from his cancer.   His anemia is worse, Hgb of 8.4 g/dL. Normal MCV, elevated MCH. Will obtain iron studies and ferritin today. Denies any dark stools but does admit to occasional bleeding with straining.   Will f/u CMP, CK and CEA.   Will repeat CBC again next week. We did discuss PRBC transfusion if his anemia worsens.   F/u in 2 weeks with repeat CBC, CMP, CK and will repeat CEA.   His last CEA increased from 904 to 1,131.53. If continues rising, consider repeating scans this month instead of waiting until August.     Recommendations for monitoring are LFT's Q2 weeks X 3 months then monthly.  CK every 2 weeks X 4 weeks then as clinically indicated.  Continue Tramadol PRN for RUQ pain.  Start mediport flushes this week.   Patient has no FH of any cancer, may need genetic testing due to the APC mutation.    All questions answered at this time.      Angel Bailey, [facility]

## 2024-07-01 ENCOUNTER — OFFICE VISIT (OUTPATIENT)
Dept: HEMATOLOGY/ONCOLOGY | Facility: CLINIC | Age: 44
End: 2024-07-01
Payer: COMMERCIAL

## 2024-07-01 ENCOUNTER — LAB VISIT (OUTPATIENT)
Dept: LAB | Facility: HOSPITAL | Age: 44
End: 2024-07-01
Attending: INTERNAL MEDICINE
Payer: COMMERCIAL

## 2024-07-01 VITALS
BODY MASS INDEX: 31.99 KG/M2 | SYSTOLIC BLOOD PRESSURE: 120 MMHG | OXYGEN SATURATION: 98 % | WEIGHT: 216 LBS | HEIGHT: 69 IN | DIASTOLIC BLOOD PRESSURE: 72 MMHG | HEART RATE: 73 BPM | TEMPERATURE: 98 F

## 2024-07-01 DIAGNOSIS — C19 COLORECTAL CANCER, STAGE IV: ICD-10-CM

## 2024-07-01 DIAGNOSIS — D64.81 ANEMIA DUE TO ANTINEOPLASTIC CHEMOTHERAPY: ICD-10-CM

## 2024-07-01 DIAGNOSIS — C19 COLORECTAL CANCER, STAGE IV: Primary | ICD-10-CM

## 2024-07-01 DIAGNOSIS — T45.1X5A ANEMIA DUE TO ANTINEOPLASTIC CHEMOTHERAPY: ICD-10-CM

## 2024-07-01 DIAGNOSIS — C78.00 COLON CANCER METASTASIZED TO LUNG: ICD-10-CM

## 2024-07-01 DIAGNOSIS — C18.9 COLON CANCER METASTASIZED TO LUNG: ICD-10-CM

## 2024-07-01 LAB
ALBUMIN SERPL-MCNC: 3.4 G/DL (ref 3.5–5)
ALBUMIN/GLOB SERPL: 1 RATIO (ref 1.1–2)
ALP SERPL-CCNC: 170 UNIT/L (ref 40–150)
ALT SERPL-CCNC: 25 UNIT/L (ref 0–55)
ANION GAP SERPL CALC-SCNC: 8 MEQ/L
AST SERPL-CCNC: 47 UNIT/L (ref 5–34)
BASOPHILS # BLD AUTO: 0.07 X10(3)/MCL
BASOPHILS NFR BLD AUTO: 0.5 %
BILIRUB SERPL-MCNC: 1.6 MG/DL
BUN SERPL-MCNC: 18.5 MG/DL (ref 8.9–20.6)
CALCIUM SERPL-MCNC: 8.9 MG/DL (ref 8.4–10.2)
CEA SERPL-MCNC: 1191.49 NG/ML (ref 0–3)
CHLORIDE SERPL-SCNC: 107 MMOL/L (ref 98–107)
CK SERPL-CCNC: 81 U/L (ref 30–200)
CO2 SERPL-SCNC: 23 MMOL/L (ref 22–29)
CREAT SERPL-MCNC: 1.17 MG/DL (ref 0.73–1.18)
CREAT/UREA NIT SERPL: 16
EOSINOPHIL # BLD AUTO: 0.44 X10(3)/MCL (ref 0–0.9)
EOSINOPHIL NFR BLD AUTO: 2.9 %
ERYTHROCYTE [DISTWIDTH] IN BLOOD BY AUTOMATED COUNT: 18.1 % (ref 11.5–17)
FERRITIN SERPL-MCNC: 903.88 NG/ML (ref 21.81–274.66)
GFR SERPLBLD CREATININE-BSD FMLA CKD-EPI: >60 ML/MIN/1.73/M2
GLOBULIN SER-MCNC: 3.4 GM/DL (ref 2.4–3.5)
GLUCOSE SERPL-MCNC: 143 MG/DL (ref 74–100)
HCT VFR BLD AUTO: 25 % (ref 42–52)
HGB BLD-MCNC: 8.4 G/DL (ref 14–18)
IMM GRANULOCYTES # BLD AUTO: 0.08 X10(3)/MCL (ref 0–0.04)
IMM GRANULOCYTES NFR BLD AUTO: 0.5 %
IRON SATN MFR SERPL: 21 % (ref 20–50)
IRON SERPL-MCNC: 45 UG/DL (ref 65–175)
LYMPHOCYTES # BLD AUTO: 1.81 X10(3)/MCL (ref 0.6–4.6)
LYMPHOCYTES NFR BLD AUTO: 11.8 %
MCH RBC QN AUTO: 31.1 PG (ref 27–31)
MCHC RBC AUTO-ENTMCNC: 33.6 G/DL (ref 33–36)
MCV RBC AUTO: 92.6 FL (ref 80–94)
MONOCYTES # BLD AUTO: 1.18 X10(3)/MCL (ref 0.1–1.3)
MONOCYTES NFR BLD AUTO: 7.7 %
NEUTROPHILS # BLD AUTO: 11.74 X10(3)/MCL (ref 2.1–9.2)
NEUTROPHILS NFR BLD AUTO: 76.6 %
PLATELET # BLD AUTO: 375 X10(3)/MCL (ref 130–400)
PMV BLD AUTO: 9.8 FL (ref 7.4–10.4)
POTASSIUM SERPL-SCNC: 4.3 MMOL/L (ref 3.5–5.1)
PROT SERPL-MCNC: 6.8 GM/DL (ref 6.4–8.3)
RBC # BLD AUTO: 2.7 X10(6)/MCL (ref 4.7–6.1)
SODIUM SERPL-SCNC: 138 MMOL/L (ref 136–145)
TIBC SERPL-MCNC: 168 UG/DL (ref 69–240)
TIBC SERPL-MCNC: 213 UG/DL (ref 250–450)
TRANSFERRIN SERPL-MCNC: 201 MG/DL (ref 174–364)
WBC # BLD AUTO: 15.32 X10(3)/MCL (ref 4.5–11.5)

## 2024-07-01 PROCEDURE — 82378 CARCINOEMBRYONIC ANTIGEN: CPT

## 2024-07-01 PROCEDURE — 3078F DIAST BP <80 MM HG: CPT | Mod: CPTII,S$GLB,, | Performed by: NURSE PRACTITIONER

## 2024-07-01 PROCEDURE — 4010F ACE/ARB THERAPY RXD/TAKEN: CPT | Mod: CPTII,S$GLB,, | Performed by: NURSE PRACTITIONER

## 2024-07-01 PROCEDURE — 80053 COMPREHEN METABOLIC PANEL: CPT

## 2024-07-01 PROCEDURE — 99215 OFFICE O/P EST HI 40 MIN: CPT | Mod: S$GLB,,, | Performed by: NURSE PRACTITIONER

## 2024-07-01 PROCEDURE — 36415 COLL VENOUS BLD VENIPUNCTURE: CPT

## 2024-07-01 PROCEDURE — 99999 PR PBB SHADOW E&M-EST. PATIENT-LVL IV: CPT | Mod: PBBFAC,,, | Performed by: NURSE PRACTITIONER

## 2024-07-01 PROCEDURE — 82550 ASSAY OF CK (CPK): CPT

## 2024-07-01 PROCEDURE — 3008F BODY MASS INDEX DOCD: CPT | Mod: CPTII,S$GLB,, | Performed by: NURSE PRACTITIONER

## 2024-07-01 PROCEDURE — 85025 COMPLETE CBC W/AUTO DIFF WBC: CPT

## 2024-07-01 PROCEDURE — 3074F SYST BP LT 130 MM HG: CPT | Mod: CPTII,S$GLB,, | Performed by: NURSE PRACTITIONER

## 2024-07-01 PROCEDURE — 1160F RVW MEDS BY RX/DR IN RCRD: CPT | Mod: CPTII,S$GLB,, | Performed by: NURSE PRACTITIONER

## 2024-07-01 PROCEDURE — 1159F MED LIST DOCD IN RCRD: CPT | Mod: CPTII,S$GLB,, | Performed by: NURSE PRACTITIONER

## 2024-07-01 PROCEDURE — 82728 ASSAY OF FERRITIN: CPT

## 2024-07-01 PROCEDURE — 83550 IRON BINDING TEST: CPT

## 2024-07-01 PROCEDURE — 83540 ASSAY OF IRON: CPT

## 2024-07-02 ENCOUNTER — TELEPHONE (OUTPATIENT)
Dept: HEMATOLOGY/ONCOLOGY | Facility: CLINIC | Age: 44
End: 2024-07-02
Payer: COMMERCIAL

## 2024-07-02 NOTE — PROGRESS NOTES
Anemia worsening. Ferritin 903. Alcensa or do you think his tumor is bleeding? CEA increased slightly.

## 2024-07-08 ENCOUNTER — LAB VISIT (OUTPATIENT)
Dept: LAB | Facility: HOSPITAL | Age: 44
End: 2024-07-08
Attending: INTERNAL MEDICINE
Payer: COMMERCIAL

## 2024-07-08 ENCOUNTER — TELEPHONE (OUTPATIENT)
Dept: HEMATOLOGY/ONCOLOGY | Facility: CLINIC | Age: 44
End: 2024-07-08
Payer: COMMERCIAL

## 2024-07-08 DIAGNOSIS — T45.1X5A ANEMIA DUE TO ANTINEOPLASTIC CHEMOTHERAPY: ICD-10-CM

## 2024-07-08 DIAGNOSIS — C19 COLORECTAL CANCER, STAGE IV: ICD-10-CM

## 2024-07-08 DIAGNOSIS — C78.00 COLON CANCER METASTASIZED TO LUNG: ICD-10-CM

## 2024-07-08 DIAGNOSIS — C18.9 COLON CANCER METASTASIZED TO LUNG: ICD-10-CM

## 2024-07-08 DIAGNOSIS — D64.81 ANEMIA DUE TO ANTINEOPLASTIC CHEMOTHERAPY: ICD-10-CM

## 2024-07-08 LAB
BASOPHILS # BLD AUTO: 0.05 X10(3)/MCL
BASOPHILS NFR BLD AUTO: 0.3 %
EOSINOPHIL # BLD AUTO: 0.41 X10(3)/MCL (ref 0–0.9)
EOSINOPHIL NFR BLD AUTO: 2.6 %
ERYTHROCYTE [DISTWIDTH] IN BLOOD BY AUTOMATED COUNT: 18.8 % (ref 11.5–17)
HCT VFR BLD AUTO: 25.3 % (ref 42–52)
HGB BLD-MCNC: 8.4 G/DL (ref 14–18)
IMM GRANULOCYTES # BLD AUTO: 0.08 X10(3)/MCL (ref 0–0.04)
IMM GRANULOCYTES NFR BLD AUTO: 0.5 %
LYMPHOCYTES # BLD AUTO: 1.86 X10(3)/MCL (ref 0.6–4.6)
LYMPHOCYTES NFR BLD AUTO: 12 %
MCH RBC QN AUTO: 31.1 PG (ref 27–31)
MCHC RBC AUTO-ENTMCNC: 33.2 G/DL (ref 33–36)
MCV RBC AUTO: 93.7 FL (ref 80–94)
MONOCYTES # BLD AUTO: 1.1 X10(3)/MCL (ref 0.1–1.3)
MONOCYTES NFR BLD AUTO: 7.1 %
NEUTROPHILS # BLD AUTO: 12.01 X10(3)/MCL (ref 2.1–9.2)
NEUTROPHILS NFR BLD AUTO: 77.5 %
PLATELET # BLD AUTO: 415 X10(3)/MCL (ref 130–400)
PMV BLD AUTO: 9.8 FL (ref 7.4–10.4)
RBC # BLD AUTO: 2.7 X10(6)/MCL (ref 4.7–6.1)
WBC # BLD AUTO: 15.51 X10(3)/MCL (ref 4.5–11.5)

## 2024-07-08 PROCEDURE — 36415 COLL VENOUS BLD VENIPUNCTURE: CPT

## 2024-07-08 PROCEDURE — 85025 COMPLETE CBC W/AUTO DIFF WBC: CPT

## 2024-07-08 NOTE — TELEPHONE ENCOUNTER
----- Message from Renee Gipson MD sent at 7/5/2024  8:04 AM CDT -----  Prob alecensa and just anemia from cancer  ----- Message -----  From: Angel Bailey FNP  Sent: 7/2/2024   8:12 AM CDT  To: Renee Gipson MD    Anemia worsening. Ferritin 903. Alcensa or do you think his tumor is bleeding? CEA increased slightly.

## 2024-07-10 NOTE — PROGRESS NOTES
Subjective:       Patient ID: Owen Swanson is a 44 y.o. male.    Rectal Cancer Stage IV--Diagnosed 23  Biopsy/pathology:   1. Colonoscopy 23--large fungating rectal mass 5-10cm from anal verge to approximately 20cm, biopsy with well differentiated colorectal adenocarcinoma.   2. Bronchoscopy with biopsy of endobronchial mass RUL biopsy done 3/16/23--metastatic poorly differentiated colonic adenocarcinoma. Foundation One with PD-L1 0%, ALK rearrangement, KRAS G12V, AKT2 amplification, APCV, FBXW7, CCND3 amplification, TP53, VEGFA amplification, TMB 8, MSI-high not detected, elevated tumor fraction.   3. Liquid Biopsy Guardant 360 done 24--KRAS G12V, APC , TP53 R248W, MSI high not detected, NRAS, BRAF, Her2 and NTRK negative.  Imagin. PET/CT 24--interval enlargement and increased metabolic activity of primary rectal mass, c/w local disease progression, stable hypermetabolic hepatic metastatic disease, grossly stable infiltrating tumor in RUL bronchus, suspected subtle interval enlargement of subcentimeter nodule in TIGRE and RLL.   2. CT C/A/P w/ contrast done at [facility] 3/18/24--Similar to slight increase in size of left upper and right lower lobe pulmonary nodules. Grossly similar right upper lobe peribronchial lesion and nodularity, similar to slight increase in size of conglomerate maria isabel hepatic and peripancreatic lymphadenopathy, grossly similar metastatic liver lesions.   3. PET/CT 24--Findings concerning for progression of primary rectal mass and metastatic disease.   4. CT A/P done 24--long segment rectal wall thickening and nodularity compatible with known rectal malignancy, multiple calcified abdominopelvic lymph nodes compatible with metastatic nodes. Reference portal caval lymph node measures 2.4 cm short axis, Multiple bilobar hypodense, variably calcified hepatic masses compatible with colorectal metastases. Largest discrete lesion in the right lobe measures  approximately 8.7 cm, findings similar to prior.     Treatment history:  FOLFOX/Avastin 2/22/23 followed by maintenance.  Progression in 11/2023.   FOLFIRI/Avastin 11/2023--4/29/24 (stopped due to progression).    CEA:   05/09/24--904  06/12/24--1,131.53  07/01/24--1,191.49    Current treatment plan:   Alectinib 600mg PO BID started 5/22/24.     Chief Complaint: Pain, Abdominal Pain, Abdominal Cramping, Insomnia, Diarrhea, Constipation, and Shortness of Breath    HPI  Patient presents for follow-up of Stage IV rectal cancer. He continues on Alecensa. He has multiple complaints today including severe fatigue, insomnia, increasing pain, alternating diarrhea and constipation, pressure in rectum and can't really control things. Also having more SOB. He is concerned his treatment is not working.       Past Medical History:   Diagnosis Date    Anxiety     Hypertension       History reviewed. No pertinent surgical history.  Family History   Problem Relation Name Age of Onset    Hypertension Mother      Coronary artery disease Father      Hypertension Father      Diabetes Father       Social History     Socioeconomic History    Marital status:    Tobacco Use    Smoking status: Former     Types: Cigarettes    Smokeless tobacco: Never   Substance and Sexual Activity    Alcohol use: Not Currently    Drug use: Yes     Types: Marijuana       Review of patient's allergies indicates:  No Known Allergies   Current Outpatient Medications on File Prior to Visit   Medication Sig Dispense Refill    albuterol (PROVENTIL) 2.5 mg /3 mL (0.083 %) nebulizer solution Inhale 2.5 mg into the lungs every 6 (six) hours as needed.      alectinib (ALECENSA) 150 mg Cap Take 4 capsules (600 mg total) by mouth twice daily with food. 240 capsule 3    ALPRAZolam (XANAX) 0.25 MG tablet Take 1 tablet (0.25 mg total) by mouth 2 (two) times daily as needed for Anxiety. 60 tablet 1    ALPRAZolam (XANAX) 0.25 MG tablet Take 1 tablet (0.25 mg total) by  mouth 2 (two) times daily as needed for Anxiety. 60 tablet 1    amlodipine-benazepril 10-20mg (LOTREL) 10-20 mg per capsule Take 1 capsule by mouth once daily.      ascorbic acid, vitamin C, (VITAMIN C) 1000 MG tablet Take 4 tablets by mouth every evening.      cholecalciferol, vitamin D3, (VITAMIN D3) 25 mcg (1,000 unit) capsule Take 1 capsule by mouth every evening.      cyanocobalamin (VITAMIN B-12) 100 MCG tablet Take 1 tablet by mouth every evening.      gabapentin (NEURONTIN) 300 MG capsule Start with taking 300 mg once nightly x 3 days and then increase to 300 mg twice daily x 3 days and then 300 mg three times a day thereafter.      magnesium 200 mg Tab Take 2 tablets by mouth every evening.      omeprazole (PRILOSEC) 20 MG capsule Take 1 capsule by mouth every morning.      ondansetron (ZOFRAN) 4 MG tablet Take 4 mg by mouth every 8 (eight) hours as needed.      promethazine (PHENERGAN) 25 MG tablet Take 25 mg by mouth every 6 (six) hours as needed.      traMADoL (ULTRAM) 50 mg tablet Take 1 tablet (50 mg total) by mouth every 6 (six) hours as needed for Pain. 90 tablet 3    zinc acetate 50 mg (zinc) Cap Take 1 tablet by mouth every evening.      fluoride, sodium, (PREVIDENT 5000 BOOSTER PLUS) 1.1 % Pste  (Patient not taking: Reported on 7/16/2024)       No current facility-administered medications on file prior to visit.      Review of Systems   Constitutional:  Positive for fatigue and unexpected weight change (weight fluctuates). Negative for appetite change.   HENT:  Negative for mouth sores.    Eyes:  Negative for visual disturbance.   Respiratory:  Negative for cough and shortness of breath.    Cardiovascular:  Negative for chest pain.   Gastrointestinal:  Positive for abdominal pain and constipation. Negative for diarrhea.   Genitourinary:  Positive for frequency.   Musculoskeletal:  Positive for myalgias. Negative for back pain.   Neurological:  Negative for headaches.   Hematological:  Negative for  "adenopathy.   Psychiatric/Behavioral:  The patient is nervous/anxious.          Vitals:    07/16/24 1318   BP: 120/79   Pulse: 74   Resp: 14   Temp: 98.6 °F (37 °C)   TempSrc: Oral   Weight: 95.5 kg (210 lb 8 oz)   Height: 5' 9" (1.753 m)       Physical Exam  Constitutional:       General: He is awake.      Appearance: Normal appearance. He is normal weight.   HENT:      Head: Normocephalic and atraumatic.      Nose: Nose normal.      Mouth/Throat:      Mouth: Mucous membranes are moist.   Eyes:      General: Vision grossly intact.      Extraocular Movements: Extraocular movements intact.      Conjunctiva/sclera: Conjunctivae normal.   Cardiovascular:      Rate and Rhythm: Normal rate and regular rhythm.      Heart sounds: Normal heart sounds.   Pulmonary:      Effort: Pulmonary effort is normal.      Breath sounds: Normal breath sounds.   Chest:      Comments: Left chest wall mediport in place  Abdominal:      General: Abdomen is protuberant. Bowel sounds are normal. There is no distension.      Palpations: Abdomen is soft.      Tenderness: There is no abdominal tenderness.   Musculoskeletal:      Cervical back: Normal range of motion and neck supple.      Right lower leg: No edema.      Left lower leg: No edema.   Lymphadenopathy:      Cervical: No cervical adenopathy.      Upper Body:      Right upper body: No supraclavicular adenopathy.      Left upper body: No supraclavicular adenopathy.   Skin:     General: Skin is warm.   Neurological:      Mental Status: He is alert and oriented to person, place, and time.      Motor: Motor function is intact.   Psychiatric:         Mood and Affect: Mood normal.         Speech: Speech normal.         Behavior: Behavior is cooperative.         Judgment: Judgment normal.       Lab Visit on 07/16/2024   Component Date Value Ref Range Status    WBC 07/16/2024 16.60 (H)  4.50 - 11.50 x10(3)/mcL Final    RBC 07/16/2024 2.85 (L)  4.70 - 6.10 x10(6)/mcL Final    Hgb 07/16/2024 8.6 " (L)  14.0 - 18.0 g/dL Final    Hct 07/16/2024 26.4 (L)  42.0 - 52.0 % Final    MCV 07/16/2024 92.6  80.0 - 94.0 fL Final    MCH 07/16/2024 30.2  27.0 - 31.0 pg Final    MCHC 07/16/2024 32.6 (L)  33.0 - 36.0 g/dL Final    RDW 07/16/2024 17.9 (H)  11.5 - 17.0 % Final    Platelet 07/16/2024 455 (H)  130 - 400 x10(3)/mcL Final    MPV 07/16/2024 10.0  7.4 - 10.4 fL Final    Neut % 07/16/2024 77.7  % Final    Lymph % 07/16/2024 12.7  % Final    Mono % 07/16/2024 6.1  % Final    Eos % 07/16/2024 2.5  % Final    Basophil % 07/16/2024 0.5  % Final    Lymph # 07/16/2024 2.11  0.6 - 4.6 x10(3)/mcL Final    Neut # 07/16/2024 12.89 (H)  2.1 - 9.2 x10(3)/mcL Final    Mono # 07/16/2024 1.02  0.1 - 1.3 x10(3)/mcL Final    Eos # 07/16/2024 0.41  0 - 0.9 x10(3)/mcL Final    Baso # 07/16/2024 0.08  <=0.2 x10(3)/mcL Final    IG# 07/16/2024 0.09 (H)  0 - 0.04 x10(3)/mcL Final    IG% 07/16/2024 0.5  % Final         Assessment:       1. Colorectal cancer, stage IV    2. Colon cancer metastasized to lung      Plan:       Patient with Stage IV rectal cancer, lung, liver, abdominal mariya metastases diagnosed in 2/2023. KRAS mutated.  Patient has failed FOLFOX avastin and FOLFIRI avastin.  Most Recent PET from 4/29/24 shows disease progression and CEA rising.    In review of previous Foundation One testing, noted to have an ALK rearrangement.   I did a literature review and there have been documented responses in colon cancers treatment with ALK inhibitors.   Case presented at [facility] clinical trials tumor board. No current clinical trials available, but treatment recommended with Alectinib.   Case presented at molecular tumor board on 6/4/24, agreed with trial of Alectinib.    Discussed conventional treatment with 3rd line Stivarga vs. Lonsurf which have not historically had good outcomes. Also discussed incurable nature of disease and continued palliative goals of treatment.  Would favor a more aggressive approach to treatment given his  excellent functional status and young age. She did not think that Keytruda or immunotherapy would be effective given JESUS.     Patient started Alectinib 600mg PO BID on 5/22/24.  ER visit for constipation on 5/28/24. No obstruction, prescribed lactulose. States he was told most of his symptoms are likely due to rectal pressure from the mass. No longer taking anything for constipation.   Patient now having multiple other issues, severe fatigue, and insomnia.     CBC diff today with= ongoing elevated WBC, suspect this is from his cancer.   Anemia is stable, but worse overall since starting on Alecensa.   Iron studies unremarkable with high ferritin.     Will f/u CMP, CK and CEA.   Last CEA was marginally increased.     Will repeat CBC again next week. We did discuss PRBC transfusion if his anemia worsens.   Concerns for progression given all his worsening symptoms.    Obtain CT C/A/P for restaging.   F/u after scan is complete.    Discussed further treatment options moving forward.     Start Oxycodone 5mg 1-2 PO Q4 hours PRN pain.   Patient to try Xanax 0.25-0.5mg as needed for insomnia and continue for anxiety.  Refer to palliative care for symptom control.    Recommendations for monitoring are LFT's Q2 weeks X 3 months then monthly.  CK every 2 weeks X 4 weeks then as clinically indicated.    Continue mediport flushes.     Patient has no FH of any cancer, may need genetic testing due to the APC mutation.    All questions answered at this time.      Renee Gipson MD

## 2024-07-16 ENCOUNTER — LAB VISIT (OUTPATIENT)
Dept: LAB | Facility: HOSPITAL | Age: 44
End: 2024-07-16
Attending: INTERNAL MEDICINE
Payer: COMMERCIAL

## 2024-07-16 ENCOUNTER — OFFICE VISIT (OUTPATIENT)
Dept: HEMATOLOGY/ONCOLOGY | Facility: CLINIC | Age: 44
End: 2024-07-16
Payer: COMMERCIAL

## 2024-07-16 VITALS
HEIGHT: 69 IN | DIASTOLIC BLOOD PRESSURE: 79 MMHG | BODY MASS INDEX: 31.18 KG/M2 | WEIGHT: 210.5 LBS | SYSTOLIC BLOOD PRESSURE: 120 MMHG | HEART RATE: 74 BPM | RESPIRATION RATE: 14 BRPM | TEMPERATURE: 99 F

## 2024-07-16 DIAGNOSIS — C19 COLORECTAL CANCER, STAGE IV: Primary | ICD-10-CM

## 2024-07-16 DIAGNOSIS — C18.9 COLON CANCER METASTASIZED TO LUNG: ICD-10-CM

## 2024-07-16 DIAGNOSIS — T45.1X5A ANEMIA DUE TO ANTINEOPLASTIC CHEMOTHERAPY: ICD-10-CM

## 2024-07-16 DIAGNOSIS — D64.81 ANEMIA DUE TO ANTINEOPLASTIC CHEMOTHERAPY: ICD-10-CM

## 2024-07-16 DIAGNOSIS — C78.00 COLON CANCER METASTASIZED TO LUNG: ICD-10-CM

## 2024-07-16 DIAGNOSIS — C19 COLORECTAL CANCER, STAGE IV: ICD-10-CM

## 2024-07-16 LAB
ALBUMIN SERPL-MCNC: 3.3 G/DL (ref 3.5–5)
ALBUMIN/GLOB SERPL: 0.9 RATIO (ref 1.1–2)
ALP SERPL-CCNC: 195 UNIT/L (ref 40–150)
ALT SERPL-CCNC: 27 UNIT/L (ref 0–55)
ANION GAP SERPL CALC-SCNC: 10 MEQ/L
AST SERPL-CCNC: 41 UNIT/L (ref 5–34)
BASOPHILS # BLD AUTO: 0.08 X10(3)/MCL
BASOPHILS NFR BLD AUTO: 0.5 %
BILIRUB SERPL-MCNC: 1.4 MG/DL
BUN SERPL-MCNC: 12 MG/DL (ref 8.9–20.6)
CALCIUM SERPL-MCNC: 9.4 MG/DL (ref 8.4–10.2)
CEA SERPL-MCNC: 1496.72 NG/ML (ref 0–3)
CHLORIDE SERPL-SCNC: 108 MMOL/L (ref 98–107)
CK SERPL-CCNC: 58 U/L (ref 30–200)
CO2 SERPL-SCNC: 25 MMOL/L (ref 22–29)
CREAT SERPL-MCNC: 0.84 MG/DL (ref 0.73–1.18)
CREAT/UREA NIT SERPL: 14
EOSINOPHIL # BLD AUTO: 0.41 X10(3)/MCL (ref 0–0.9)
EOSINOPHIL NFR BLD AUTO: 2.5 %
ERYTHROCYTE [DISTWIDTH] IN BLOOD BY AUTOMATED COUNT: 17.9 % (ref 11.5–17)
GFR SERPLBLD CREATININE-BSD FMLA CKD-EPI: >60 ML/MIN/1.73/M2
GLOBULIN SER-MCNC: 3.6 GM/DL (ref 2.4–3.5)
GLUCOSE SERPL-MCNC: 114 MG/DL (ref 74–100)
HCT VFR BLD AUTO: 26.4 % (ref 42–52)
HGB BLD-MCNC: 8.6 G/DL (ref 14–18)
IMM GRANULOCYTES # BLD AUTO: 0.09 X10(3)/MCL (ref 0–0.04)
IMM GRANULOCYTES NFR BLD AUTO: 0.5 %
LYMPHOCYTES # BLD AUTO: 2.11 X10(3)/MCL (ref 0.6–4.6)
LYMPHOCYTES NFR BLD AUTO: 12.7 %
MCH RBC QN AUTO: 30.2 PG (ref 27–31)
MCHC RBC AUTO-ENTMCNC: 32.6 G/DL (ref 33–36)
MCV RBC AUTO: 92.6 FL (ref 80–94)
MONOCYTES # BLD AUTO: 1.02 X10(3)/MCL (ref 0.1–1.3)
MONOCYTES NFR BLD AUTO: 6.1 %
NEUTROPHILS # BLD AUTO: 12.89 X10(3)/MCL (ref 2.1–9.2)
NEUTROPHILS NFR BLD AUTO: 77.7 %
PLATELET # BLD AUTO: 455 X10(3)/MCL (ref 130–400)
PMV BLD AUTO: 10 FL (ref 7.4–10.4)
POTASSIUM SERPL-SCNC: 5.3 MMOL/L (ref 3.5–5.1)
PROT SERPL-MCNC: 6.9 GM/DL (ref 6.4–8.3)
RBC # BLD AUTO: 2.85 X10(6)/MCL (ref 4.7–6.1)
SODIUM SERPL-SCNC: 143 MMOL/L (ref 136–145)
WBC # BLD AUTO: 16.6 X10(3)/MCL (ref 4.5–11.5)

## 2024-07-16 PROCEDURE — 3078F DIAST BP <80 MM HG: CPT | Mod: CPTII,S$GLB,, | Performed by: INTERNAL MEDICINE

## 2024-07-16 PROCEDURE — 85025 COMPLETE CBC W/AUTO DIFF WBC: CPT

## 2024-07-16 PROCEDURE — 3074F SYST BP LT 130 MM HG: CPT | Mod: CPTII,S$GLB,, | Performed by: INTERNAL MEDICINE

## 2024-07-16 PROCEDURE — 4010F ACE/ARB THERAPY RXD/TAKEN: CPT | Mod: CPTII,S$GLB,, | Performed by: INTERNAL MEDICINE

## 2024-07-16 PROCEDURE — 80053 COMPREHEN METABOLIC PANEL: CPT

## 2024-07-16 PROCEDURE — 36415 COLL VENOUS BLD VENIPUNCTURE: CPT

## 2024-07-16 PROCEDURE — 1159F MED LIST DOCD IN RCRD: CPT | Mod: CPTII,S$GLB,, | Performed by: INTERNAL MEDICINE

## 2024-07-16 PROCEDURE — 99999 PR PBB SHADOW E&M-EST. PATIENT-LVL V: CPT | Mod: PBBFAC,,, | Performed by: INTERNAL MEDICINE

## 2024-07-16 PROCEDURE — 1160F RVW MEDS BY RX/DR IN RCRD: CPT | Mod: CPTII,S$GLB,, | Performed by: INTERNAL MEDICINE

## 2024-07-16 PROCEDURE — 82378 CARCINOEMBRYONIC ANTIGEN: CPT

## 2024-07-16 PROCEDURE — 82550 ASSAY OF CK (CPK): CPT

## 2024-07-16 PROCEDURE — 3008F BODY MASS INDEX DOCD: CPT | Mod: CPTII,S$GLB,, | Performed by: INTERNAL MEDICINE

## 2024-07-16 PROCEDURE — 99215 OFFICE O/P EST HI 40 MIN: CPT | Mod: S$GLB,,, | Performed by: INTERNAL MEDICINE

## 2024-07-16 RX ORDER — OXYCODONE HYDROCHLORIDE 5 MG/1
TABLET ORAL
Qty: 240 TABLET | Refills: 0 | Status: SHIPPED | OUTPATIENT
Start: 2024-07-16

## 2024-07-23 NOTE — PROGRESS NOTES
Subjective:       Patient ID: Owen Swanson is a 44 y.o. male.    Rectal Cancer Stage IV--Diagnosed 23  Biopsy/pathology:   1. Colonoscopy 23--large fungating rectal mass 5-10cm from anal verge to approximately 20cm, biopsy with well differentiated colorectal adenocarcinoma.   2. Bronchoscopy with biopsy of endobronchial mass RUL biopsy done 3/16/23--metastatic poorly differentiated colonic adenocarcinoma. Foundation One with PD-L1 0%, ALK rearrangement, KRAS G12V, AKT2 amplification, APCV, FBXW7, CCND3 amplification, TP53, VEGFA amplification, TMB 8, MSI-high not detected, elevated tumor fraction.   3. Liquid Biopsy Guardant 360 done 24--KRAS G12V, APC , TP53 R248W, MSI high not detected, NRAS, BRAF, Her2 and NTRK negative.  Guardant 360 liquid biopsy done 24--KRAS G12V, APC and TP53 mutations.   Imagin. PET/CT 24--interval enlargement and increased metabolic activity of primary rectal mass, c/w local disease progression, stable hypermetabolic hepatic metastatic disease, grossly stable infiltrating tumor in RUL bronchus, suspected subtle interval enlargement of subcentimeter nodule in TIGRE and RLL.   2. CT C/A/P w/ contrast done [facility] 3/18/24--Similar to slight increase in size of left upper and right lower lobe pulmonary nodules. Grossly similar right upper lobe peribronchial lesion and nodularity, similar to slight increase in size of conglomerate maria isabel hepatic and peripancreatic lymphadenopathy, grossly similar metastatic liver lesions.   3. PET/CT 24--Findings concerning for progression of primary rectal mass and metastatic disease.   4. CT A/P done 24--long segment rectal wall thickening and nodularity compatible with known rectal malignancy, multiple calcified abdominopelvic lymph nodes compatible with metastatic nodes. Reference portal caval lymph node measures 2.4 cm short axis, Multiple bilobar hypodense, variably calcified hepatic masses compatible with  colorectal metastases. Largest discrete lesion in the right lobe measures approximately 8.7 cm, findings similar to prior.   5. CT C/A/P done 7/24/24--progressive pulmonary metastatic disease, bulky branching endobronchial tumor anterior RUL, appears enlarged but difficult to measure, 4.5cm (prior 2.5cm), nodule at TIGRE measures 10mm (prior 7mm), nodule RLL measures 16mm (prior 12mm), progressive hepatic metastatic disease, few lesions enlarged two in hepatic segment VI measure 2.6 and 4.4cm (prior 1.9 and 4cm), majority appear stable, large pathologic lymph nodes in maria isabel hepatis have progressed, for example LN anterior to celiac artery measures 2.8cm vs. Prior 2.3cm, new 2cm splenic lesion, potentially metastatic, large rectal mass 8cm, similar, invading the mesorectal fat, grossly stable partially calcified metastatic lymph nodes in mesorectal fat and retroperitoneum.     Treatment history:  FOLFOX/Avastin 2/22/23 followed by maintenance.  Progression in 11/2023.   FOLFIRI/Avastin 11/2023--4/29/24 (stopped due to progression).  Alectinib 5/22/24--7/26/24 (stopped due to progression).    CEA:   05/09/24--904  06/12/24--1,131.53  07/01/24--1,191.49  07/16/24--1,496.72    Current treatment plan:   Fruquintinb 5mg daily X 21 days on/7 days off      Chief Complaint: Constipation, Diarrhea, Dizziness, and Pain    HPI  Patient presents for follow-up of Stage IV rectal cancer. He continues on Alecensa. Recent CT does show some tumor progression. He is feeling better. Reports that he is sleeping better, taking Promethazine, Tramadol and Xanax. His energy has improved, but his appetite is still down. I went over results of CT and plans for changing treatment. He also expressed interest in going to [facility] for clinical trial options. Anemia has progressed today with Hgb 7.8g/dL. Discussed blood transfusion and he is in agreement. He does have ongoing fatigue.     Past Medical History:   Diagnosis Date    Anxiety     Hypertension        History reviewed. No pertinent surgical history.  Family History   Problem Relation Name Age of Onset    Hypertension Mother      Coronary artery disease Father      Hypertension Father      Diabetes Father       Social History     Socioeconomic History    Marital status:    Tobacco Use    Smoking status: Former     Types: Cigarettes    Smokeless tobacco: Never   Substance and Sexual Activity    Alcohol use: Not Currently    Drug use: Yes     Types: Marijuana       Review of patient's allergies indicates:  No Known Allergies   Current Outpatient Medications on File Prior to Visit   Medication Sig Dispense Refill    albuterol (PROVENTIL) 2.5 mg /3 mL (0.083 %) nebulizer solution Inhale 2.5 mg into the lungs every 6 (six) hours as needed.      alectinib (ALECENSA) 150 mg Cap Take 4 capsules (600 mg total) by mouth twice daily with food. 240 capsule 3    ALPRAZolam (XANAX) 0.25 MG tablet Take 1 tablet (0.25 mg total) by mouth 2 (two) times daily as needed for Anxiety. 60 tablet 1    ALPRAZolam (XANAX) 0.25 MG tablet Take 1 tablet (0.25 mg total) by mouth 2 (two) times daily as needed for Anxiety. 60 tablet 1    amlodipine-benazepril 10-20mg (LOTREL) 10-20 mg per capsule Take 1 capsule by mouth once daily.      ascorbic acid, vitamin C, (VITAMIN C) 1000 MG tablet Take 4 tablets by mouth every evening.      cholecalciferol, vitamin D3, (VITAMIN D3) 25 mcg (1,000 unit) capsule Take 1 capsule by mouth every evening.      cyanocobalamin (VITAMIN B-12) 100 MCG tablet Take 1 tablet by mouth every evening.      fluoride, sodium, (PREVIDENT 5000 BOOSTER PLUS) 1.1 % Pste       gabapentin (NEURONTIN) 300 MG capsule Start with taking 300 mg once nightly x 3 days and then increase to 300 mg twice daily x 3 days and then 300 mg three times a day thereafter.      magnesium 200 mg Tab Take 2 tablets by mouth every evening.      omeprazole (PRILOSEC) 20 MG capsule Take 1 capsule by mouth every morning.      ondansetron  "(ZOFRAN) 4 MG tablet Take 4 mg by mouth every 8 (eight) hours as needed.      oxyCODONE (ROXICODONE) 5 MG immediate release tablet Take 1-2 tablets every 6 hours as needed for pain. 240 tablet 0    promethazine (PHENERGAN) 25 MG tablet Take 25 mg by mouth every 6 (six) hours as needed.      traMADoL (ULTRAM) 50 mg tablet Take 1 tablet (50 mg total) by mouth every 6 (six) hours as needed for Pain. 90 tablet 3    zinc acetate 50 mg (zinc) Cap Take 1 tablet by mouth every evening.       No current facility-administered medications on file prior to visit.      Review of Systems   Constitutional:  Positive for fatigue and unexpected weight change (weight fluctuates). Negative for appetite change.   HENT:  Negative for mouth sores.    Eyes:  Negative for visual disturbance.   Respiratory:  Negative for cough and shortness of breath.    Cardiovascular:  Negative for chest pain.   Gastrointestinal:  Positive for abdominal pain and constipation. Negative for diarrhea.   Genitourinary:  Positive for frequency.   Musculoskeletal:  Positive for myalgias. Negative for back pain.   Neurological:  Negative for headaches.   Hematological:  Negative for adenopathy.   Psychiatric/Behavioral:  The patient is nervous/anxious.          Vitals:    07/26/24 0918   BP: 110/69   Pulse: (!) 54   Resp: 14   Temp: 98.2 °F (36.8 °C)   TempSrc: Oral   SpO2: 100%   Weight: 94.9 kg (209 lb 3.2 oz)   Height: 5' 9" (1.753 m)         Physical Exam  Constitutional:       General: He is awake.      Appearance: Normal appearance. He is normal weight.   HENT:      Head: Normocephalic and atraumatic.      Nose: Nose normal.      Mouth/Throat:      Mouth: Mucous membranes are moist.   Eyes:      General: Vision grossly intact.      Extraocular Movements: Extraocular movements intact.      Conjunctiva/sclera: Conjunctivae normal.   Cardiovascular:      Rate and Rhythm: Normal rate and regular rhythm.      Heart sounds: Normal heart sounds.   Pulmonary:      " Effort: Pulmonary effort is normal.      Breath sounds: Normal breath sounds.   Chest:      Comments: Left chest wall mediport in place  Abdominal:      General: Abdomen is protuberant. Bowel sounds are normal. There is no distension.      Palpations: Abdomen is soft.      Tenderness: There is no abdominal tenderness.   Musculoskeletal:      Cervical back: Normal range of motion and neck supple.      Right lower leg: No edema.      Left lower leg: No edema.   Lymphadenopathy:      Cervical: No cervical adenopathy.      Upper Body:      Right upper body: No supraclavicular adenopathy.      Left upper body: No supraclavicular adenopathy.   Skin:     General: Skin is warm.   Neurological:      Mental Status: He is alert and oriented to person, place, and time.      Motor: Motor function is intact.   Psychiatric:         Mood and Affect: Mood normal.         Speech: Speech normal.         Behavior: Behavior is cooperative.         Judgment: Judgment normal.       No visits with results within 1 Day(s) from this visit.   Latest known visit with results is:   Lab Visit on 07/16/2024   Component Date Value Ref Range Status    Sodium 07/16/2024 143  136 - 145 mmol/L Final    Potassium 07/16/2024 5.3 (H)  3.5 - 5.1 mmol/L Final    Chloride 07/16/2024 108 (H)  98 - 107 mmol/L Final    CO2 07/16/2024 25  22 - 29 mmol/L Final    Glucose 07/16/2024 114 (H)  74 - 100 mg/dL Final    Blood Urea Nitrogen 07/16/2024 12.0  8.9 - 20.6 mg/dL Final    Creatinine 07/16/2024 0.84  0.73 - 1.18 mg/dL Final    Calcium 07/16/2024 9.4  8.4 - 10.2 mg/dL Final    Protein Total 07/16/2024 6.9  6.4 - 8.3 gm/dL Final    Albumin 07/16/2024 3.3 (L)  3.5 - 5.0 g/dL Final    Globulin 07/16/2024 3.6 (H)  2.4 - 3.5 gm/dL Final    Albumin/Globulin Ratio 07/16/2024 0.9 (L)  1.1 - 2.0 ratio Final    Bilirubin Total 07/16/2024 1.4  <=1.5 mg/dL Final    ALP 07/16/2024 195 (H)  40 - 150 unit/L Final    ALT 07/16/2024 27  0 - 55 unit/L Final    AST 07/16/2024 41  (H)  5 - 34 unit/L Final    eGFR 07/16/2024 >60  mL/min/1.73/m2 Final    Anion Gap 07/16/2024 10.0  mEq/L Final    BUN/Creatinine Ratio 07/16/2024 14   Final    Creatine Kinase 07/16/2024 58  30 - 200 U/L Final    Carcinoembryonic Antigen 07/16/2024 1,496.72 (H)  0.00 - 3.00 ng/mL Final    WBC 07/16/2024 16.60 (H)  4.50 - 11.50 x10(3)/mcL Final    RBC 07/16/2024 2.85 (L)  4.70 - 6.10 x10(6)/mcL Final    Hgb 07/16/2024 8.6 (L)  14.0 - 18.0 g/dL Final    Hct 07/16/2024 26.4 (L)  42.0 - 52.0 % Final    MCV 07/16/2024 92.6  80.0 - 94.0 fL Final    MCH 07/16/2024 30.2  27.0 - 31.0 pg Final    MCHC 07/16/2024 32.6 (L)  33.0 - 36.0 g/dL Final    RDW 07/16/2024 17.9 (H)  11.5 - 17.0 % Final    Platelet 07/16/2024 455 (H)  130 - 400 x10(3)/mcL Final    MPV 07/16/2024 10.0  7.4 - 10.4 fL Final    Neut % 07/16/2024 77.7  % Final    Lymph % 07/16/2024 12.7  % Final    Mono % 07/16/2024 6.1  % Final    Eos % 07/16/2024 2.5  % Final    Basophil % 07/16/2024 0.5  % Final    Lymph # 07/16/2024 2.11  0.6 - 4.6 x10(3)/mcL Final    Neut # 07/16/2024 12.89 (H)  2.1 - 9.2 x10(3)/mcL Final    Mono # 07/16/2024 1.02  0.1 - 1.3 x10(3)/mcL Final    Eos # 07/16/2024 0.41  0 - 0.9 x10(3)/mcL Final    Baso # 07/16/2024 0.08  <=0.2 x10(3)/mcL Final    IG# 07/16/2024 0.09 (H)  0 - 0.04 x10(3)/mcL Final    IG% 07/16/2024 0.5  % Final         Assessment:       1. Colorectal cancer, stage IV    2. Colon cancer metastasized to lung        Plan:       Patient with Stage IV rectal cancer, lung, liver, abdominal mariya metastases diagnosed in 2/2023. KRAS mutated.  Patient has failed FOLFOX avastin and FOLFIRI avastin.  Most Recent PET from 4/29/24 shows disease progression and CEA rising.    In review of previous Foundation One testing, noted to have an ALK rearrangement.   I did a literature review and there have been documented responses in colon cancers treatment with ALK inhibitors.   Case presented at [facility] clinical trials tumor board. No  current clinical trials available, but treatment recommended with Alectinib.   Case presented at molecular tumor board on 6/4/24, agreed with trial of Alectinib.    Discussed conventional treatment with 3rd line Stivarga vs. Lonsurf which have not historically had good outcomes. Also discussed incurable nature of disease and continued palliative goals of treatment.  Would favor a more aggressive approach to treatment given his excellent functional status and young age. She did not think that Keytruda or immunotherapy would be effective given JESUS.     Patient started Alectinib 600mg PO BID on 5/22/24.  CT done 7/24/24 shows some stable disease, but mostly progression.  Recent CEA also increasing.     Recommend to change treatment to Fruquintinib per [facility].   Will also send referral [facility] for clinical trial options.     CBC today with progressive anemia Hgb 7.8g/dL. WBC also high, likely related to disease.   Will f/u CMP.     Transfuse 2 units PRBC on Monday for symptomatic anemia.    Will send prescription for insurance approval for Fruquintinib 5mg daily X 21 days on/7 days off.   Check baseline urine protein today.  F/u in 3 weeks labs and toxicity check.  Patient told to monitor BP weekly, and he has cuff at home.    Continue Tramadol for pain.  There was a prescription for Oxycodone 5mg 1-2 PO Q4 hours PRN pain sent to pharmacy and they will check on this.   Continue Xanax 0.25-0.5mg as needed for insomnia and anxiety.  Continue Promethazine at night as well. This is helping with his sleep.    Continue mediport flushes.     Patient has no FH of any cancer, may need genetic testing due to the APC mutation.    All questions answered at this time.      Renee Gipson MD

## 2024-07-26 ENCOUNTER — OFFICE VISIT (OUTPATIENT)
Dept: HEMATOLOGY/ONCOLOGY | Facility: CLINIC | Age: 44
End: 2024-07-26
Payer: COMMERCIAL

## 2024-07-26 ENCOUNTER — LAB VISIT (OUTPATIENT)
Dept: LAB | Facility: HOSPITAL | Age: 44
End: 2024-07-26
Attending: INTERNAL MEDICINE
Payer: COMMERCIAL

## 2024-07-26 VITALS
TEMPERATURE: 98 F | SYSTOLIC BLOOD PRESSURE: 110 MMHG | WEIGHT: 209.19 LBS | DIASTOLIC BLOOD PRESSURE: 69 MMHG | HEIGHT: 69 IN | OXYGEN SATURATION: 100 % | HEART RATE: 54 BPM | BODY MASS INDEX: 30.98 KG/M2 | RESPIRATION RATE: 14 BRPM

## 2024-07-26 DIAGNOSIS — C78.00 COLON CANCER METASTASIZED TO LUNG: ICD-10-CM

## 2024-07-26 DIAGNOSIS — C19 COLORECTAL CANCER, STAGE IV: Primary | ICD-10-CM

## 2024-07-26 DIAGNOSIS — C18.9 COLON CANCER METASTASIZED TO LUNG: ICD-10-CM

## 2024-07-26 DIAGNOSIS — C19 COLORECTAL CANCER, STAGE IV: ICD-10-CM

## 2024-07-26 LAB
ALBUMIN SERPL-MCNC: 3.2 G/DL (ref 3.5–5)
ALBUMIN/GLOB SERPL: 0.9 RATIO (ref 1.1–2)
ALP SERPL-CCNC: 234 UNIT/L (ref 40–150)
ALT SERPL-CCNC: 27 UNIT/L (ref 0–55)
ANION GAP SERPL CALC-SCNC: 8 MEQ/L
AST SERPL-CCNC: 44 UNIT/L (ref 5–34)
BASOPHILS # BLD AUTO: 0.06 X10(3)/MCL
BASOPHILS NFR BLD AUTO: 0.3 %
BILIRUB SERPL-MCNC: 1.2 MG/DL
BUN SERPL-MCNC: 16.2 MG/DL (ref 8.9–20.6)
CALCIUM SERPL-MCNC: 9.1 MG/DL (ref 8.4–10.2)
CHLORIDE SERPL-SCNC: 106 MMOL/L (ref 98–107)
CO2 SERPL-SCNC: 25 MMOL/L (ref 22–29)
CREAT SERPL-MCNC: 1.05 MG/DL (ref 0.73–1.18)
CREAT/UREA NIT SERPL: 15
EOSINOPHIL # BLD AUTO: 0.59 X10(3)/MCL (ref 0–0.9)
EOSINOPHIL NFR BLD AUTO: 3.4 %
ERYTHROCYTE [DISTWIDTH] IN BLOOD BY AUTOMATED COUNT: 18.2 % (ref 11.5–17)
GFR SERPLBLD CREATININE-BSD FMLA CKD-EPI: >60 ML/MIN/1.73/M2
GLOBULIN SER-MCNC: 3.6 GM/DL (ref 2.4–3.5)
GLUCOSE SERPL-MCNC: 97 MG/DL (ref 74–100)
GROUP & RH: NORMAL
HCT VFR BLD AUTO: 24 % (ref 42–52)
HGB BLD-MCNC: 7.8 G/DL (ref 14–18)
IMM GRANULOCYTES # BLD AUTO: 0.13 X10(3)/MCL (ref 0–0.04)
IMM GRANULOCYTES NFR BLD AUTO: 0.7 %
INDIRECT COOMBS: NORMAL
LYMPHOCYTES # BLD AUTO: 1.79 X10(3)/MCL (ref 0.6–4.6)
LYMPHOCYTES NFR BLD AUTO: 10.3 %
MCH RBC QN AUTO: 30.8 PG (ref 27–31)
MCHC RBC AUTO-ENTMCNC: 32.5 G/DL (ref 33–36)
MCV RBC AUTO: 94.9 FL (ref 80–94)
MONOCYTES # BLD AUTO: 1.19 X10(3)/MCL (ref 0.1–1.3)
MONOCYTES NFR BLD AUTO: 6.8 %
NEUTROPHILS # BLD AUTO: 13.62 X10(3)/MCL (ref 2.1–9.2)
NEUTROPHILS NFR BLD AUTO: 78.5 %
PLATELET # BLD AUTO: 414 X10(3)/MCL (ref 130–400)
PMV BLD AUTO: 10.2 FL (ref 7.4–10.4)
POTASSIUM SERPL-SCNC: 5.2 MMOL/L (ref 3.5–5.1)
PROT SERPL-MCNC: 6.8 GM/DL (ref 6.4–8.3)
PROT UR QL STRIP: ABNORMAL
RBC # BLD AUTO: 2.53 X10(6)/MCL (ref 4.7–6.1)
SODIUM SERPL-SCNC: 139 MMOL/L (ref 136–145)
SPECIMEN OUTDATE: NORMAL
VIT B12 SERPL-MCNC: >2000 PG/ML (ref 213–816)
WBC # BLD AUTO: 17.38 X10(3)/MCL (ref 4.5–11.5)

## 2024-07-26 PROCEDURE — 86850 RBC ANTIBODY SCREEN: CPT | Performed by: INTERNAL MEDICINE

## 2024-07-26 PROCEDURE — 85025 COMPLETE CBC W/AUTO DIFF WBC: CPT

## 2024-07-26 PROCEDURE — 86900 BLOOD TYPING SEROLOGIC ABO: CPT | Performed by: INTERNAL MEDICINE

## 2024-07-26 PROCEDURE — 99999 PR PBB SHADOW E&M-EST. PATIENT-LVL V: CPT | Mod: PBBFAC,,, | Performed by: INTERNAL MEDICINE

## 2024-07-26 PROCEDURE — 81005 URINALYSIS: CPT

## 2024-07-26 PROCEDURE — 82607 VITAMIN B-12: CPT

## 2024-07-26 PROCEDURE — 36415 COLL VENOUS BLD VENIPUNCTURE: CPT

## 2024-07-26 PROCEDURE — 80053 COMPREHEN METABOLIC PANEL: CPT

## 2024-07-26 PROCEDURE — 86901 BLOOD TYPING SEROLOGIC RH(D): CPT | Performed by: INTERNAL MEDICINE

## 2024-07-26 RX ORDER — DIPHENHYDRAMINE HCL 25 MG
25 CAPSULE ORAL
OUTPATIENT
Start: 2024-07-26

## 2024-07-26 RX ORDER — ACETAMINOPHEN 325 MG/1
650 TABLET ORAL
OUTPATIENT
Start: 2024-07-26

## 2024-07-26 RX ORDER — HYDROCODONE BITARTRATE AND ACETAMINOPHEN 500; 5 MG/1; MG/1
TABLET ORAL ONCE
OUTPATIENT
Start: 2024-07-26 | End: 2024-07-26

## 2024-07-29 ENCOUNTER — LAB VISIT (OUTPATIENT)
Dept: LAB | Facility: HOSPITAL | Age: 44
End: 2024-07-29
Attending: INTERNAL MEDICINE
Payer: COMMERCIAL

## 2024-07-29 ENCOUNTER — INFUSION (OUTPATIENT)
Dept: INFUSION THERAPY | Facility: HOSPITAL | Age: 44
End: 2024-07-29
Attending: INTERNAL MEDICINE
Payer: COMMERCIAL

## 2024-07-29 VITALS — DIASTOLIC BLOOD PRESSURE: 70 MMHG | HEART RATE: 73 BPM | TEMPERATURE: 98 F | SYSTOLIC BLOOD PRESSURE: 118 MMHG

## 2024-07-29 DIAGNOSIS — C19 COLORECTAL CANCER, STAGE IV: ICD-10-CM

## 2024-07-29 DIAGNOSIS — C78.00 COLON CANCER METASTASIZED TO LUNG: ICD-10-CM

## 2024-07-29 DIAGNOSIS — C18.9 COLON CANCER METASTASIZED TO LUNG: ICD-10-CM

## 2024-07-29 LAB
ABO + RH BLD: NORMAL
ABO + RH BLD: NORMAL
ABORH RETYPE: NORMAL
BLD PROD TYP BPU: NORMAL
BLD PROD TYP BPU: NORMAL
BLOOD UNIT EXPIRATION DATE: NORMAL
BLOOD UNIT EXPIRATION DATE: NORMAL
BLOOD UNIT TYPE CODE: 5100
BLOOD UNIT TYPE CODE: 5100
CROSSMATCH INTERPRETATION: NORMAL
CROSSMATCH INTERPRETATION: NORMAL
DISPENSE STATUS: NORMAL
DISPENSE STATUS: NORMAL
UNIT NUMBER: NORMAL
UNIT NUMBER: NORMAL

## 2024-07-29 PROCEDURE — P9016 RBC LEUKOCYTES REDUCED: HCPCS | Performed by: INTERNAL MEDICINE

## 2024-07-29 PROCEDURE — 36430 TRANSFUSION BLD/BLD COMPNT: CPT

## 2024-07-29 PROCEDURE — 86923 COMPATIBILITY TEST ELECTRIC: CPT | Performed by: INTERNAL MEDICINE

## 2024-07-29 PROCEDURE — 25000003 PHARM REV CODE 250: Performed by: INTERNAL MEDICINE

## 2024-07-29 PROCEDURE — 36415 COLL VENOUS BLD VENIPUNCTURE: CPT

## 2024-07-29 RX ORDER — ACETAMINOPHEN 325 MG/1
650 TABLET ORAL
Status: COMPLETED | OUTPATIENT
Start: 2024-07-29 | End: 2024-07-29

## 2024-07-29 RX ORDER — HYDROCODONE BITARTRATE AND ACETAMINOPHEN 500; 5 MG/1; MG/1
TABLET ORAL ONCE
Status: COMPLETED | OUTPATIENT
Start: 2024-07-29 | End: 2024-07-29

## 2024-07-29 RX ORDER — DIPHENHYDRAMINE HCL 25 MG
25 CAPSULE ORAL
Status: COMPLETED | OUTPATIENT
Start: 2024-07-29 | End: 2024-07-29

## 2024-07-29 RX ADMIN — SODIUM CHLORIDE: 9 INJECTION, SOLUTION INTRAVENOUS at 08:07

## 2024-07-29 RX ADMIN — ACETAMINOPHEN 650 MG: 325 TABLET ORAL at 08:07

## 2024-07-29 RX ADMIN — DIPHENHYDRAMINE HYDROCHLORIDE 25 MG: 25 CAPSULE ORAL at 08:07

## 2024-08-02 ENCOUNTER — TELEPHONE (OUTPATIENT)
Dept: HEMATOLOGY/ONCOLOGY | Facility: CLINIC | Age: 44
End: 2024-08-02
Payer: COMMERCIAL

## 2024-08-02 NOTE — TELEPHONE ENCOUNTER
Spoke to Marie. Received message from [facility] stating an appointment has not been made yet because they are waiting to hear from the patient. I gave Marie the phone number to the colorectal center. She will wait for her  to return home and then call.

## 2024-08-09 DIAGNOSIS — C19 COLORECTAL CANCER, STAGE IV: Primary | ICD-10-CM

## 2024-08-12 ENCOUNTER — OFFICE VISIT (OUTPATIENT)
Dept: PALLIATIVE MEDICINE | Facility: CLINIC | Age: 44
End: 2024-08-12
Payer: COMMERCIAL

## 2024-08-12 ENCOUNTER — LAB VISIT (OUTPATIENT)
Dept: LAB | Facility: HOSPITAL | Age: 44
End: 2024-08-12
Attending: NURSE PRACTITIONER
Payer: COMMERCIAL

## 2024-08-12 ENCOUNTER — TELEPHONE (OUTPATIENT)
Dept: PALLIATIVE MEDICINE | Facility: CLINIC | Age: 44
End: 2024-08-12

## 2024-08-12 VITALS
HEART RATE: 81 BPM | TEMPERATURE: 98 F | RESPIRATION RATE: 18 BRPM | WEIGHT: 197 LBS | OXYGEN SATURATION: 99 % | HEIGHT: 69 IN | BODY MASS INDEX: 29.18 KG/M2 | DIASTOLIC BLOOD PRESSURE: 89 MMHG | SYSTOLIC BLOOD PRESSURE: 127 MMHG

## 2024-08-12 DIAGNOSIS — C19 COLORECTAL CANCER, STAGE IV: ICD-10-CM

## 2024-08-12 DIAGNOSIS — G47.00 INSOMNIA, UNSPECIFIED TYPE: ICD-10-CM

## 2024-08-12 DIAGNOSIS — G89.3 NEOPLASM RELATED PAIN: ICD-10-CM

## 2024-08-12 DIAGNOSIS — C18.9 COLON CANCER METASTASIZED TO LUNG: ICD-10-CM

## 2024-08-12 DIAGNOSIS — K59.03 CONSTIPATION DUE TO OPIOID THERAPY: ICD-10-CM

## 2024-08-12 DIAGNOSIS — C78.00 COLON CANCER METASTASIZED TO LUNG: ICD-10-CM

## 2024-08-12 DIAGNOSIS — T40.2X5A CONSTIPATION DUE TO OPIOID THERAPY: ICD-10-CM

## 2024-08-12 DIAGNOSIS — C19 COLORECTAL CANCER, STAGE IV: Primary | ICD-10-CM

## 2024-08-12 DIAGNOSIS — F41.9 ANXIETY: ICD-10-CM

## 2024-08-12 DIAGNOSIS — Z51.5 ENCOUNTER FOR PALLIATIVE CARE: ICD-10-CM

## 2024-08-12 DIAGNOSIS — R11.0 NAUSEA: ICD-10-CM

## 2024-08-12 LAB
BASOPHILS # BLD AUTO: 0.08 X10(3)/MCL
BASOPHILS NFR BLD AUTO: 0.6 %
EOSINOPHIL # BLD AUTO: 0.58 X10(3)/MCL (ref 0–0.9)
EOSINOPHIL NFR BLD AUTO: 4.7 %
ERYTHROCYTE [DISTWIDTH] IN BLOOD BY AUTOMATED COUNT: 15.8 % (ref 11.5–17)
HCT VFR BLD AUTO: 31.9 % (ref 42–52)
HGB BLD-MCNC: 10.5 G/DL (ref 14–18)
IMM GRANULOCYTES # BLD AUTO: 0.04 X10(3)/MCL (ref 0–0.04)
IMM GRANULOCYTES NFR BLD AUTO: 0.3 %
LYMPHOCYTES # BLD AUTO: 1.57 X10(3)/MCL (ref 0.6–4.6)
LYMPHOCYTES NFR BLD AUTO: 12.7 %
MCH RBC QN AUTO: 30.6 PG (ref 27–31)
MCHC RBC AUTO-ENTMCNC: 32.9 G/DL (ref 33–36)
MCV RBC AUTO: 93 FL (ref 80–94)
MONOCYTES # BLD AUTO: 0.83 X10(3)/MCL (ref 0.1–1.3)
MONOCYTES NFR BLD AUTO: 6.7 %
NEUTROPHILS # BLD AUTO: 9.3 X10(3)/MCL (ref 2.1–9.2)
NEUTROPHILS NFR BLD AUTO: 75 %
PLATELET # BLD AUTO: 416 X10(3)/MCL (ref 130–400)
PMV BLD AUTO: 9.9 FL (ref 7.4–10.4)
RBC # BLD AUTO: 3.43 X10(6)/MCL (ref 4.7–6.1)
WBC # BLD AUTO: 12.4 X10(3)/MCL (ref 4.5–11.5)

## 2024-08-12 PROCEDURE — 36415 COLL VENOUS BLD VENIPUNCTURE: CPT

## 2024-08-12 PROCEDURE — 85025 COMPLETE CBC W/AUTO DIFF WBC: CPT

## 2024-08-12 PROCEDURE — 99999 PR PBB SHADOW E&M-EST. PATIENT-LVL V: CPT | Mod: PBBFAC,,,

## 2024-08-12 RX ORDER — PROMETHAZINE HYDROCHLORIDE 25 MG/1
25 TABLET ORAL EVERY 6 HOURS PRN
Qty: 34 TABLET | Refills: 0 | Status: SHIPPED | OUTPATIENT
Start: 2024-08-12

## 2024-08-12 RX ORDER — TRAMADOL HYDROCHLORIDE 100 MG/1
100 TABLET, COATED ORAL EVERY 6 HOURS PRN
Qty: 34 TABLET | Refills: 0 | Status: SHIPPED | OUTPATIENT
Start: 2024-08-12

## 2024-08-12 RX ORDER — ALPRAZOLAM 0.25 MG/1
0.25 TABLET ORAL 2 TIMES DAILY PRN
Qty: 60 TABLET | Refills: 1 | Status: CANCELLED | OUTPATIENT
Start: 2024-08-12 | End: 2025-08-12

## 2024-08-12 NOTE — TELEPHONE ENCOUNTER
Spoke to pharmacy regarding prior authorization of patient medications. Pharmacy stated patient medication was denied due to patient premium lapse. Left message on patient number and patient sisters number fr patient to call back about this. Pharmacy stated Tramadol will be $68+ and Phenergan will be $25+. Pharmacy asked if I would like them to order the medications and I told her she would have to ask the patient but if I found out I would let them know.

## 2024-08-12 NOTE — PATIENT INSTRUCTIONS
Gabapentin 2 tablets (600 mg) every night for sleep and pain  Increase Tramadol  OTC stool softener as directed every other day  Refill Xanax

## 2024-08-12 NOTE — PROGRESS NOTES
Palliative Clinic    Patient ID: [id number]     Chief Complaint: Establish Care      HPI:     Owen Swanson is a 44 y.o. male with PMH of anxiety, hypertension, substance use (remote history of cocaine use), and stage IV colorectal adenocarcinoma initially diagnosed February 2023.  Per imaging since January 2024 he has developed interval enlargement and increased metabolic activity in the primary rectal mass consistent with local disease progression, progression of hepatic and pulmonary (RUL, TIGRE, RLL) metastases, pathologic lymph node enlargement, and potential splenic metastases.  He has previously been treated with FOLFOX/Avastin, FOLFIRI/Avastin, and Alectinib, which have been stopped secondary to disease progression.  Current treatment plan includes Fruquintinb.  He has been referred by oncology to [facility] for clinical trial options.  Here today to establish care with palliative medicine service for symptom management and goals of care.    His current pain regimen includes Tramadol 50 mg q6 hours PRN and gabapentin 300 mg TID.  However, he is not taking tramadol often as he does not get significant relief with use and is taking the gabapentin 2-4 capsules q.h.s. for sleep and pain.  A prescription for oxycodone 5mg IR 1-2 tablets q6 hours PRN pain sent to pharmacy, but not filled.  Patient patient reports he does not want to escalate his pain regimen at this time to include oxycodone or morphine.  His pain is primarily controlled on ibuprofen 4-8 tablets per day (on average 4 tablets per day). Discussed risks of GI bleeding and renal dysfunction.  He reports having been advised against using ibuprofen frequently by other providers also, but this is the most effective for his pain. Additional pain relief with heating pad and THC use. Pain is to lower back, hips, and radiating down thighs bilaterally. Describes pain as deep ache, rated 7-8/10 at worst and at best 0/10. Pain interferes with his ability to  "work and concentrate. Explained need for consistent regimen to allow for titration and to take medications as Rx'd. Patient verbalizes understanding. Change gabapentin to 2 tablets (600 mg) qHS. Tramadol increase to 100 mg q6hrs PRN, with no relief with 50 mg. Consider muscle relaxer in future with some spasms.    Anxiety/Depression has been controlled previously on Xanax 0.25mg BID PRN. He reports some recent anxiety regarding finances, relationship burdens, and treatments. He has some relief with Xanax and feels better when he is able to discuss burdens/fears with family and friends. He had some anxiety regarding this appointment. He states that him and his wife cried when they initially googled "Palliative Medicine." Explained that this service is here for symptom management while he undergoes treatment and is an extra layer of support. He expressed gratitude and appreciation.    Insomnia is currently well controlled with Xanax 0.25mg, Promethazine 25mg qHS PRN for sleep, Gabapentin 2-4 tablets qHS, and THC tincture qHS. States he is able to fall asleep and sleep most of the night with this regimen, and he is very hesitant to change this. Discussed to continue current regimen, with taking Gabapentin 600 mg (2 capsules) qHS for sleep.    Nausea is controlled on nightly Promethazine and PRN Ondansetron. He denies nausea and vomiting since initiation of PO chemo.    Current bowel regimen includes OTC stool softener 3 tablets PRN with an average of one bowel movement per week. He feels the urge to have a bowel movement in the morning, but when he goes to the bathroom the urge is relieved with urination and passing gas/"silt." Explained the need for consistent bowel movements, especially with mass. Discussed to initiate OTC stool softener consistently (every other day) and titrate as directed.    Past Medical History:   Diagnosis Date    Anxiety     Hypertension         History reviewed. No pertinent surgical history. "     Social History     Tobacco Use    Smoking status: Former     Types: Cigarettes    Smokeless tobacco: Never   Substance and Sexual Activity    Alcohol use: Not Currently    Drug use: Yes     Types: Marijuana    Sexual activity: Not on file        Current Outpatient Medications   Medication Instructions    albuterol (PROVENTIL) 2.5 mg, Inhalation, Every 6 hours PRN    ALPRAZolam (XANAX) 0.25 mg, Oral, 2 times daily PRN    ALPRAZolam (XANAX) 0.25 mg, Oral, 2 times daily PRN    amlodipine-benazepril 10-20mg (LOTREL) 10-20 mg per capsule 1 capsule, Oral, Daily    ascorbic acid, vitamin C, (VITAMIN C) 1000 MG tablet 4 tablets, Oral, Nightly    cholecalciferol, vitamin D3, (VITAMIN D3) 25 mcg (1,000 unit) capsule 1 capsule, Oral, Nightly    cyanocobalamin (VITAMIN B-12) 100 MCG tablet 1 tablet, Oral, Nightly    fluoride, sodium, (PREVIDENT 5000 BOOSTER PLUS) 1.1 % Pste     fruquintinib 5 mg Cap Take 1 capsule (5 mg) by mouth once daily for 21 days, then off for 7 days    gabapentin (NEURONTIN) 300 mg, Oral, 3 times daily    magnesium 200 mg Tab 2 tablets, Oral, Nightly    omeprazole (PRILOSEC) 20 MG capsule 1 capsule, Oral, Every morning    ondansetron (ZOFRAN) 4 mg, Oral, Every 8 hours PRN    promethazine (PHENERGAN) 25 mg, Oral, Every 6 hours PRN    traMADoL 100 mg, Oral, Every 6 hours PRN    zinc acetate 50 mg (zinc) Cap 1 tablet, Oral, Nightly       Review of patient's allergies indicates:  No Known Allergies     Patient Care Team:  Mao Crook DO as PCP - General (Internal Medicine)  Lamar Mc PharmD as Pharmacist (Pharmacist)     Subjective:     Review of Systems   Constitutional:  Positive for fatigue.   HENT: Negative.     Respiratory: Negative.     Cardiovascular: Negative.    Gastrointestinal:  Positive for constipation (BM 1x per week, with frequent small BM during the day) and nausea. Negative for diarrhea and vomiting.   Genitourinary:         Pain with full bladder   Musculoskeletal:   "Positive for arthralgias and back pain.   Skin: Negative.    Neurological:  Positive for weakness.   Psychiatric/Behavioral:  Negative for suicidal ideas. The patient is nervous/anxious.        12 point review of systems conducted, negative except as stated in the history of present illness. See HPI for details.    Objective:     Visit Vitals  /89 (BP Location: Left arm, Patient Position: Sitting, BP Method: Medium (Automatic))   Pulse 81   Temp 98.3 °F (36.8 °C) (Oral)   Resp 18   Ht 5' 9" (1.753 m)   Wt 89.4 kg (197 lb)   SpO2 99%   BMI 29.09 kg/m²      Vitals:    08/12/24 1107   PainSc:   1   PainLoc: Back       Physical Exam  Constitutional:       General: He is not in acute distress.     Appearance: He is ill-appearing.   HENT:      Head: Normocephalic.      Mouth/Throat:      Mouth: Mucous membranes are moist.   Eyes:      Extraocular Movements: Extraocular movements intact.   Cardiovascular:      Rate and Rhythm: Normal rate and regular rhythm.      Heart sounds: Normal heart sounds. No murmur heard.  Pulmonary:      Effort: No respiratory distress.      Breath sounds: Normal breath sounds.   Abdominal:      General: There is no distension.      Palpations: Abdomen is soft.   Musculoskeletal:      Cervical back: Normal range of motion.      Right lower leg: No edema.      Left lower leg: No edema.   Skin:     General: Skin is warm and dry.   Neurological:      General: No focal deficit present.      Mental Status: He is alert and oriented to person, place, and time.   Psychiatric:         Behavior: Behavior normal.         Review of Symptoms      Symptom Assessment (ESAS 0-10 Scale)  Pain:  0  Dyspnea:  0  Anxiety:  0  Nausea:  0  Depression:  0  Anorexia:  0  Fatigue:  0  Insomnia:  0  Restlessness:  0  Agitation:  0       Anxiety:  Is nervous/anxious  Constipation:  Constipation (BM 1x per week, with frequent small BM during the day)    Bowel Management Plan (BMP):  Yes      Living Arrangements:  " "Lives with family    Psychosocial/Cultural:   See Palliative Psychosocial Note: Yes   and 23 year old daughter living in home. 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 History of substance use, sober for "many years."  **Primary  to Follow**  Palliative Care  Consult: No    Spiritual:  F - Ofelia and Belief:  Not Voodoo, believes in God.        Labs Reviewed:     Chemistry:  Lab Results   Component Value Date     07/26/2024    K 5.2 (H) 07/26/2024    BUN 16.2 07/26/2024    CREATININE 1.05 07/26/2024    EGFRNORACEVR >60 07/26/2024    GLUCOSE 97 07/26/2024    CALCIUM 9.1 07/26/2024    ALKPHOS 234 (H) 07/26/2024    LABPROT 6.8 07/26/2024    ALBUMIN 3.2 (L) 07/26/2024    AST 44 (H) 07/26/2024    ALT 27 07/26/2024        Hematology:  Lab Results   Component Value Date    WBC 12.40 (H) 08/12/2024    HGB 10.5 (L) 08/12/2024    HCT 31.9 (L) 08/12/2024     (H) 08/12/2024       Urine:  Lab Results   Component Value Date    APPEARANCEUA Clear 05/28/2024    SGUA 1.027 05/28/2024    PROTEINUA Trace (A) 07/26/2024    KETONESUA Trace (A) 05/28/2024    LEUKOCYTESUR Negative 05/28/2024    RBCUA 0-5 05/28/2024    WBCUA 0-5 05/28/2024    BACTERIA None Seen 05/28/2024    SQEPUA Trace 05/28/2024        Advanced Care Planning:     Advance Care Planning     Date: 08/12/2024    [facility]  I engaged the patient in a voluntary conversation about advance care planning and we specifically addressed what the goals of care would be moving forward, in light of the patient's change in clinical status, specifically current condition.  We did specifically address the patient's likely prognosis, which is fair .  We explored the patient's values and preferences for future care.  The patient endorses that what is most important right now is to focus on symptom/pain control and curative/life-prolongation (regardless of treatment burdens)    Accordingly, we have decided that the best plan to meet the " patient's goals includes continuing with treatment       Advance Directives:     Decision Making:  Patient answered questions  Goals of Care: The patient endorses that what is most important right now is to focus on symptom/pain control and curative/life-prolongation (regardless of treatment burdens)    Accordingly, we have decided that the best plan to meet the patient's goals includes continuing with treatment    Discussed advanced care planning and documents.  Patient states he has not completed medical power of  or living will previously.  Explained that in the event he is unable to make medical decisions for himself his legal next of kin and surrogate decision maker would be his wife.  He verbalizes understanding and is in agreement with this.  Offered to complete medical power of  in the future should this change or should she want to share responsibility with another person.  Given advanced care planning informational packet and encouraged to review prior to next scheduled visit.  He states that at present his goal is to live and he plans to continue pursuing treatment options.  At present he is tolerating oral chemotherapy well.    Assessment:       ICD-10-CM ICD-9-CM   1. Colorectal cancer, stage IV  C19 154.0   2. Colon cancer metastasized to lung  C18.9 153.9    C78.00 197.0   3. Anxiety  F41.9 300.00   4. Insomnia, unspecified type  G47.00 780.52   5. Neoplasm related pain  G89.3 338.3   6. Constipation due to opioid therapy  K59.03 564.09    T40.2X5A E935.2   7. Encounter for palliative care  Z51.5 V66.7   8. Nausea  R11.0 787.02        Plan:   1. Colorectal cancer, stage IV  - Per Hematology/Oncology plan  - Currently on Fruquintinb    2. Anxiety       - Continue Xanax 0.25 mg BID PRN       - Discussed counseling options, provided [facility] counseling phone number and encouraged to call for explanation of services    3. Insomnia, unspecified type       - Continue Xanax, promethazine, and  THC tincture for sleep       - Decrease gabapentin to 600 mg qHS    4. Neoplasm related pain  - Increase to traMADoL 100 mg Tab; Take 100 mg by mouth every 6 (six) hours as needed for Pain.  Dispense: 34 tablet; Refill: 0  - Limit use of ibuprofen  - Consider use of Voltaren gel to local pain site or muscle relaxer  - Pain contract completed    6. Constipation due to opioid therapy       - Take OTC stool softener as directed on consistent regimen, initially with every other day dosing    7. Encounter for palliative care       - Discussed goals of care, which include continuing aggressive treatment options       - Will continue to discuss goals of care and advanced care planning    8. Nausea  - Refill promethazine (PHENERGAN) 25 MG tablet; Take 1 tablet (25 mg total) by mouth every 6 (six) hours as needed for Nausea.  Dispense: 34 tablet; Refill: 0    [x] Discussed risks associated with narcotic use, including sedation, nausea, and constipation. Sedation precautions include: not driving or operating heavy machinery after initiating medication. Take medication with a meal or snack to prevent nausea. Discussed addition of stool softener to prevent opioid induced constipation.    I have reviewed the narcotic prescription data via .    Follow up in about 2 weeks (around 8/26/2024) for Follow-up with NP. In addition to their scheduled follow up, the patient has also been instructed to follow up on as needed basis.     Future Appointments   Date Time Provider Department Center   [facility]2024 12[facility]   8/15/2024  1:00 [facility]   8/26/2024 11:00 AM Mell Pierson NP [facility] OP PAL [facility]   9/6/2024  8:15 AM INJECTION CHAIR 02, [facility] CHEMOTHERAPY INFUSION [facility] CHEMO [facility]   12/6/2024  8:00 AM INJECTION CHAIR 01, [facility] CHEMOTHERAPY INFUSION [facility] CHEMO [facility]        Mell Pierson NP

## 2024-08-12 NOTE — TELEPHONE ENCOUNTER
When you speak to the patient, please advise him that we can try an alternative medication that may be cheaper if needed.

## 2024-08-13 ENCOUNTER — TELEPHONE (OUTPATIENT)
Dept: HEMATOLOGY/ONCOLOGY | Facility: CLINIC | Age: 44
End: 2024-08-13
Payer: COMMERCIAL

## 2024-08-13 NOTE — TELEPHONE ENCOUNTER
Please try to contact patient again tomorrow. If we are unable to get in contact with the patient please let me know. If we are unable to reach the patient in a reasonable amount of time, we will have to cancel Rx for Tramadol.

## 2024-08-13 NOTE — TELEPHONE ENCOUNTER
----- Message from Lucian Henry LCSW sent at 8/13/2024 12:21 PM CDT -----  Regarding: referal back to [facility]  I referred Owen to [facility] last week. The didn't accept time because his HGB was 7.8 and it needs to be 8.0. Labs show his HGB is higher. Can you re-refer him to [facility] colorectal center at [phone]? He was not medically cleared but he should be now.    Lucian  ----- Message -----  From: Renee Gipson MD  Sent: 8/12/2024  10:53 AM CDT  To: Lucian Henry LCSW    New CBC. Hgb >10  ----- Message -----  From: Lab, Background User  Sent: 8/12/2024  10:52 AM CDT  To: Renee Gipson MD

## 2024-08-13 NOTE — TELEPHONE ENCOUNTER
Tried to call patient on all available numbers and could not speak to patient by the end of day. Spoke to pharmacy. Pharmacy stated patient did not  medications from pharmacy. Pharmacy stated they tried to call patient as well and have not received a call back.

## 2024-08-15 ENCOUNTER — TELEPHONE (OUTPATIENT)
Dept: HEMATOLOGY/ONCOLOGY | Facility: CLINIC | Age: 44
End: 2024-08-15
Payer: COMMERCIAL

## 2024-08-15 NOTE — TELEPHONE ENCOUNTER
Contacted [facility]. Referral reactivated today. Patient received call from [facility] stating referral had been approved and he is expecting a call with an appointment date soon.

## 2024-08-15 NOTE — TELEPHONE ENCOUNTER
Please attempt to call patient and pharmacy again on Monday. If there is no response again, please notify me.

## 2024-08-15 NOTE — TELEPHONE ENCOUNTER
I called pt bc he was a n/s for appt today. He states that he was confused and thought when Nico moved lab appt to Monday that he did not need to come today. He would like to r/s.  
(3) adequate

## 2024-08-19 ENCOUNTER — LAB VISIT (OUTPATIENT)
Dept: LAB | Facility: HOSPITAL | Age: 44
End: 2024-08-19
Attending: INTERNAL MEDICINE
Payer: COMMERCIAL

## 2024-08-19 ENCOUNTER — OFFICE VISIT (OUTPATIENT)
Dept: HEMATOLOGY/ONCOLOGY | Facility: CLINIC | Age: 44
End: 2024-08-19
Payer: COMMERCIAL

## 2024-08-19 ENCOUNTER — TELEPHONE (OUTPATIENT)
Dept: PALLIATIVE MEDICINE | Facility: CLINIC | Age: 44
End: 2024-08-19
Payer: COMMERCIAL

## 2024-08-19 VITALS
OXYGEN SATURATION: 99 % | WEIGHT: 196 LBS | HEIGHT: 69 IN | HEART RATE: 80 BPM | BODY MASS INDEX: 29.03 KG/M2 | RESPIRATION RATE: 14 BRPM | DIASTOLIC BLOOD PRESSURE: 93 MMHG | SYSTOLIC BLOOD PRESSURE: 135 MMHG | TEMPERATURE: 98 F

## 2024-08-19 DIAGNOSIS — C18.9 COLON CANCER METASTASIZED TO LUNG: ICD-10-CM

## 2024-08-19 DIAGNOSIS — D84.821 IMMUNODEFICIENCY DUE TO DRUGS: ICD-10-CM

## 2024-08-19 DIAGNOSIS — C19 COLORECTAL CANCER, STAGE IV: Primary | ICD-10-CM

## 2024-08-19 DIAGNOSIS — Z79.899 IMMUNODEFICIENCY DUE TO DRUGS: ICD-10-CM

## 2024-08-19 DIAGNOSIS — C19 COLORECTAL CANCER, STAGE IV: ICD-10-CM

## 2024-08-19 DIAGNOSIS — G89.3 CANCER RELATED PAIN: ICD-10-CM

## 2024-08-19 DIAGNOSIS — C78.00 COLON CANCER METASTASIZED TO LUNG: ICD-10-CM

## 2024-08-19 DIAGNOSIS — R05.1 ACUTE COUGH: ICD-10-CM

## 2024-08-19 DIAGNOSIS — G89.3 NEOPLASM RELATED PAIN: ICD-10-CM

## 2024-08-19 LAB
ALBUMIN SERPL-MCNC: 3.6 G/DL (ref 3.5–5)
ALBUMIN/GLOB SERPL: 0.8 RATIO (ref 1.1–2)
ALP SERPL-CCNC: 314 UNIT/L (ref 40–150)
ALT SERPL-CCNC: 51 UNIT/L (ref 0–55)
ANION GAP SERPL CALC-SCNC: 13 MEQ/L
AST SERPL-CCNC: 57 UNIT/L (ref 5–34)
BASOPHILS # BLD AUTO: 0.07 X10(3)/MCL
BASOPHILS NFR BLD AUTO: 0.7 %
BILIRUB SERPL-MCNC: 1 MG/DL
BUN SERPL-MCNC: 10.9 MG/DL (ref 8.9–20.6)
CALCIUM SERPL-MCNC: 9.7 MG/DL (ref 8.4–10.2)
CEA SERPL-MCNC: 1073.4 NG/ML (ref 0–3)
CHLORIDE SERPL-SCNC: 104 MMOL/L (ref 98–107)
CO2 SERPL-SCNC: 21 MMOL/L (ref 22–29)
CREAT SERPL-MCNC: 0.95 MG/DL (ref 0.73–1.18)
CREAT/UREA NIT SERPL: 11
EOSINOPHIL # BLD AUTO: 0.05 X10(3)/MCL (ref 0–0.9)
EOSINOPHIL NFR BLD AUTO: 0.5 %
ERYTHROCYTE [DISTWIDTH] IN BLOOD BY AUTOMATED COUNT: 15.6 % (ref 11.5–17)
GFR SERPLBLD CREATININE-BSD FMLA CKD-EPI: >60 ML/MIN/1.73/M2
GLOBULIN SER-MCNC: 4.3 GM/DL (ref 2.4–3.5)
GLUCOSE SERPL-MCNC: 113 MG/DL (ref 74–100)
HCT VFR BLD AUTO: 37.4 % (ref 42–52)
HGB BLD-MCNC: 12.4 G/DL (ref 14–18)
IMM GRANULOCYTES # BLD AUTO: 0.02 X10(3)/MCL (ref 0–0.04)
IMM GRANULOCYTES NFR BLD AUTO: 0.2 %
LYMPHOCYTES # BLD AUTO: 1.55 X10(3)/MCL (ref 0.6–4.6)
LYMPHOCYTES NFR BLD AUTO: 14.7 %
MCH RBC QN AUTO: 30.3 PG (ref 27–31)
MCHC RBC AUTO-ENTMCNC: 33.2 G/DL (ref 33–36)
MCV RBC AUTO: 91.4 FL (ref 80–94)
MONOCYTES # BLD AUTO: 1.15 X10(3)/MCL (ref 0.1–1.3)
MONOCYTES NFR BLD AUTO: 10.9 %
NEUTROPHILS # BLD AUTO: 7.7 X10(3)/MCL (ref 2.1–9.2)
NEUTROPHILS NFR BLD AUTO: 73 %
PLATELET # BLD AUTO: 361 X10(3)/MCL (ref 130–400)
PMV BLD AUTO: 9.5 FL (ref 7.4–10.4)
POTASSIUM SERPL-SCNC: 4.8 MMOL/L (ref 3.5–5.1)
PROT SERPL-MCNC: 7.9 GM/DL (ref 6.4–8.3)
PROT UR QL STRIP: ABNORMAL
RBC # BLD AUTO: 4.09 X10(6)/MCL (ref 4.7–6.1)
SODIUM SERPL-SCNC: 138 MMOL/L (ref 136–145)
WBC # BLD AUTO: 10.54 X10(3)/MCL (ref 4.5–11.5)

## 2024-08-19 PROCEDURE — 81005 URINALYSIS: CPT

## 2024-08-19 PROCEDURE — 1159F MED LIST DOCD IN RCRD: CPT | Mod: CPTII,S$GLB,, | Performed by: NURSE PRACTITIONER

## 2024-08-19 PROCEDURE — 3008F BODY MASS INDEX DOCD: CPT | Mod: CPTII,S$GLB,, | Performed by: NURSE PRACTITIONER

## 2024-08-19 PROCEDURE — 4010F ACE/ARB THERAPY RXD/TAKEN: CPT | Mod: CPTII,S$GLB,, | Performed by: NURSE PRACTITIONER

## 2024-08-19 PROCEDURE — 1160F RVW MEDS BY RX/DR IN RCRD: CPT | Mod: CPTII,S$GLB,, | Performed by: NURSE PRACTITIONER

## 2024-08-19 PROCEDURE — 3075F SYST BP GE 130 - 139MM HG: CPT | Mod: CPTII,S$GLB,, | Performed by: NURSE PRACTITIONER

## 2024-08-19 PROCEDURE — 80053 COMPREHEN METABOLIC PANEL: CPT

## 2024-08-19 PROCEDURE — 82378 CARCINOEMBRYONIC ANTIGEN: CPT

## 2024-08-19 PROCEDURE — 99215 OFFICE O/P EST HI 40 MIN: CPT | Mod: S$GLB,,, | Performed by: NURSE PRACTITIONER

## 2024-08-19 PROCEDURE — 3080F DIAST BP >= 90 MM HG: CPT | Mod: CPTII,S$GLB,, | Performed by: NURSE PRACTITIONER

## 2024-08-19 PROCEDURE — 36415 COLL VENOUS BLD VENIPUNCTURE: CPT | Mod: 91

## 2024-08-19 PROCEDURE — 36415 COLL VENOUS BLD VENIPUNCTURE: CPT

## 2024-08-19 PROCEDURE — 85025 COMPLETE CBC W/AUTO DIFF WBC: CPT

## 2024-08-19 PROCEDURE — 99999 PR PBB SHADOW E&M-EST. PATIENT-LVL V: CPT | Mod: PBBFAC,,, | Performed by: NURSE PRACTITIONER

## 2024-08-19 RX ORDER — CYCLOBENZAPRINE HCL 5 MG
5 TABLET ORAL 3 TIMES DAILY PRN
Qty: 30 TABLET | Refills: 3 | Status: SHIPPED | OUTPATIENT
Start: 2024-08-19 | End: 2024-08-29

## 2024-08-19 RX ORDER — TRAMADOL HYDROCHLORIDE 100 MG/1
100 TABLET, COATED ORAL EVERY 6 HOURS PRN
Qty: 60 TABLET | Refills: 0 | Status: SHIPPED | OUTPATIENT
Start: 2024-08-19 | End: 2024-08-19

## 2024-08-19 NOTE — PROGRESS NOTES
Subjective:       Patient ID: Owen Swanson is a 44 y.o. male.    Rectal Cancer Stage IV--Diagnosed 23  Biopsy/pathology:   1. Colonoscopy 23--large fungating rectal mass 5-10cm from anal verge to approximately 20cm, biopsy with well differentiated colorectal adenocarcinoma.   2. Bronchoscopy with biopsy of endobronchial mass RUL biopsy done 3/16/23--metastatic poorly differentiated colonic adenocarcinoma. Foundation One with PD-L1 0%, ALK rearrangement, KRAS G12V, AKT2 amplification, APCV, FBXW7, CCND3 amplification, TP53, VEGFA amplification, TMB 8, MSI-high not detected, elevated tumor fraction.   3. Liquid Biopsy Guardant 360 done 24--KRAS G12V, APC , TP53 R248W, MSI high not detected, NRAS, BRAF, Her2 and NTRK negative.  Guardant 360 liquid biopsy done 24--KRAS G12V, APC and TP53 mutations.   Imagin. PET/CT 24--interval enlargement and increased metabolic activity of primary rectal mass, c/w local disease progression, stable hypermetabolic hepatic metastatic disease, grossly stable infiltrating tumor in RUL bronchus, suspected subtle interval enlargement of subcentimeter nodule in TIGRE and RLL.   2. CT C/A/P w/ contrast done at [facility] 3/18/24--Similar to slight increase in size of left upper and right lower lobe pulmonary nodules. Grossly similar right upper lobe peribronchial lesion and nodularity, similar to slight increase in size of conglomerate maria isabel hepatic and peripancreatic lymphadenopathy, grossly similar metastatic liver lesions.   3. PET/CT 24--Findings concerning for progression of primary rectal mass and metastatic disease.   4. CT A/P done 24--long segment rectal wall thickening and nodularity compatible with known rectal malignancy, multiple calcified abdominopelvic lymph nodes compatible with metastatic nodes. Reference portal caval lymph node measures 2.4 cm short axis, Multiple bilobar hypodense, variably calcified hepatic masses compatible with  colorectal metastases. Largest discrete lesion in the right lobe measures approximately 8.7 cm, findings similar to prior.   5. CT C/A/P done 7/24/24--progressive pulmonary metastatic disease, bulky branching endobronchial tumor anterior RUL, appears enlarged but difficult to measure, 4.5cm (prior 2.5cm), nodule at TIGRE measures 10mm (prior 7mm), nodule RLL measures 16mm (prior 12mm), progressive hepatic metastatic disease, few lesions enlarged two in hepatic segment VI measure 2.6 and 4.4cm (prior 1.9 and 4cm), majority appear stable, large pathologic lymph nodes in maria isabel hepatis have progressed, for example LN anterior to celiac artery measures 2.8cm vs. Prior 2.3cm, new 2cm splenic lesion, potentially metastatic, large rectal mass 8cm, similar, invading the mesorectal fat, grossly stable partially calcified metastatic lymph nodes in mesorectal fat and retroperitoneum.     Treatment history:  FOLFOX/Avastin 2/22/23 followed by maintenance.  Progression in 11/2023.   FOLFIRI/Avastin 11/2023--4/29/24 (stopped due to progression).  Alectinib 5/22/24--7/26/24 (stopped due to progression).    CEA:   05/09/24--904  06/12/24--1,131.53  07/01/24--1,191.49  07/16/24--1,496.72    Current treatment plan:   Fruquintinib 5mg daily X 21 days on/7 days off started on 8/5/24.   Cycle 2 to start on 9/2/24.     Chief Complaint: Pain, Abdominal Pain, and Diarrhea    HPI  Patient presents for follow-up of Stage IV rectal cancer. He is doing okay today. Started Fruquintinib on 8/5/24. So far, he appears to be tolerating fairly well. Complains of ongoing fatigue and abdominal pain. States he has noticed more back pain since starting the medication (listed as side effect with 15%). Reports that he is sleeping better, taking Promethazine, Tramadol and Xanax. His appetite is still down. Weight stable today. Also complains of a dry, tickling cough that started a few weeks ago. Denies any fevers or other accompanied symptoms. He expressed  interest in going to [facility] for clinical trial options and referral has been made. He is awaiting to hear back from them.     Past Medical History:   Diagnosis Date    Anxiety     Hypertension       History reviewed. No pertinent surgical history.  Family History   Problem Relation Name Age of Onset    Hypertension Mother      Coronary artery disease Father      Hypertension Father      Diabetes Father       Social History     Socioeconomic History    Marital status:    Tobacco Use    Smoking status: Former     Types: Cigarettes    Smokeless tobacco: Never   Substance and Sexual Activity    Alcohol use: Not Currently    Drug use: Yes     Types: Marijuana       Review of patient's allergies indicates:  No Known Allergies   Current Outpatient Medications on File Prior to Visit   Medication Sig Dispense Refill    albuterol (PROVENTIL) 2.5 mg /3 mL (0.083 %) nebulizer solution Inhale 2.5 mg into the lungs every 6 (six) hours as needed.      ALPRAZolam (XANAX) 0.25 MG tablet Take 1 tablet (0.25 mg total) by mouth 2 (two) times daily as needed for Anxiety. 60 tablet 1    amlodipine-benazepril 10-20mg (LOTREL) 10-20 mg per capsule Take 1 capsule by mouth once daily.      ascorbic acid, vitamin C, (VITAMIN C) 1000 MG tablet Take 4 tablets by mouth every evening.      cholecalciferol, vitamin D3, (VITAMIN D3) 25 mcg (1,000 unit) capsule Take 1 capsule by mouth every evening.      cyanocobalamin (VITAMIN B-12) 100 MCG tablet Take 1 tablet by mouth every evening.      fruquintinib 5 mg Cap Take 1 capsule (5 mg) by mouth once daily for 21 days, then off for 7 days 21 capsule 6    gabapentin (NEURONTIN) 300 MG capsule Take 300 mg by mouth 3 (three) times daily.      magnesium 200 mg Tab Take 2 tablets by mouth every evening.      omeprazole (PRILOSEC) 20 MG capsule Take 1 capsule by mouth every morning.      ondansetron (ZOFRAN) 4 MG tablet Take 4 mg by mouth every 8 (eight) hours as needed.      promethazine (PHENERGAN) 25  "MG tablet Take 1 tablet (25 mg total) by mouth every 6 (six) hours as needed for Nausea. 34 tablet 0    traMADoL 100 mg Tab Take 100 mg by mouth every 6 (six) hours as needed for Pain. 34 tablet 0    zinc acetate 50 mg (zinc) Cap Take 1 tablet by mouth every evening.      fluoride, sodium, (PREVIDENT 5000 BOOSTER PLUS) 1.1 % Pste  (Patient not taking: Reported on 8/19/2024)      [DISCONTINUED] ALPRAZolam (XANAX) 0.25 MG tablet Take 1 tablet (0.25 mg total) by mouth 2 (two) times daily as needed for Anxiety. (Patient not taking: Reported on 8/12/2024) 60 tablet 1     No current facility-administered medications on file prior to visit.      Review of Systems   Constitutional:  Positive for appetite change and fatigue. Unexpected weight change: weight fluctuates.  HENT:  Negative for mouth sores.    Eyes:  Negative for visual disturbance.   Respiratory:  Positive for cough. Negative for shortness of breath.    Cardiovascular:  Negative for chest pain.   Gastrointestinal:  Positive for abdominal pain and constipation. Negative for diarrhea.   Musculoskeletal:  Positive for myalgias. Negative for back pain.   Neurological:  Negative for headaches.   Hematological:  Negative for adenopathy.   Psychiatric/Behavioral:  The patient is nervous/anxious.          Vitals:    08/19/24 0948   BP: (!) 135/93   Pulse: 80   Resp: 14   Temp: 98.3 °F (36.8 °C)   TempSrc: Oral   SpO2: 99%   Weight: 88.9 kg (196 lb)   Height: 5' 9" (1.753 m)     Physical Exam  Constitutional:       General: He is awake.      Appearance: Normal appearance. He is normal weight.   HENT:      Head: Normocephalic and atraumatic.      Nose: Nose normal.      Mouth/Throat:      Mouth: Mucous membranes are moist.   Eyes:      General: Vision grossly intact.      Extraocular Movements: Extraocular movements intact.      Conjunctiva/sclera: Conjunctivae normal.   Cardiovascular:      Rate and Rhythm: Normal rate and regular rhythm.      Heart sounds: Normal heart " sounds.   Pulmonary:      Effort: Pulmonary effort is normal.      Breath sounds: Examination of the right-lower field reveals wheezing. Examination of the left-lower field reveals wheezing. Wheezing present.      Comments: Faint, posterior wheeze  Chest:      Comments: Left chest wall mediport in place  Abdominal:      General: Abdomen is protuberant. Bowel sounds are normal. There is no distension.      Palpations: Abdomen is soft.      Tenderness: There is no abdominal tenderness.   Musculoskeletal:      Cervical back: Normal range of motion and neck supple.      Right lower leg: No edema.      Left lower leg: No edema.   Lymphadenopathy:      Cervical: No cervical adenopathy.      Upper Body:      Right upper body: No supraclavicular adenopathy.      Left upper body: No supraclavicular adenopathy.   Skin:     General: Skin is warm.   Neurological:      Mental Status: He is alert and oriented to person, place, and time.      Motor: Motor function is intact.   Psychiatric:         Mood and Affect: Mood normal.         Speech: Speech normal.         Behavior: Behavior is cooperative.         Judgment: Judgment normal.       Lab Visit on 08/19/2024   Component Date Value Ref Range Status    Protein, UA 08/19/2024 2+ (A)  Negative Final    WBC 08/19/2024 10.54  4.50 - 11.50 x10(3)/mcL Final    RBC 08/19/2024 4.09 (L)  4.70 - 6.10 x10(6)/mcL Final    Hgb 08/19/2024 12.4 (L)  14.0 - 18.0 g/dL Final    Hct 08/19/2024 37.4 (L)  42.0 - 52.0 % Final    MCV 08/19/2024 91.4  80.0 - 94.0 fL Final    MCH 08/19/2024 30.3  27.0 - 31.0 pg Final    MCHC 08/19/2024 33.2  33.0 - 36.0 g/dL Final    RDW 08/19/2024 15.6  11.5 - 17.0 % Final    Platelet 08/19/2024 361  130 - 400 x10(3)/mcL Final    MPV 08/19/2024 9.5  7.4 - 10.4 fL Final    Neut % 08/19/2024 73.0  % Final    Lymph % 08/19/2024 14.7  % Final    Mono % 08/19/2024 10.9  % Final    Eos % 08/19/2024 0.5  % Final    Basophil % 08/19/2024 0.7  % Final    Lymph # 08/19/2024  1.55  0.6 - 4.6 x10(3)/mcL Final    Neut # 08/19/2024 7.70  2.1 - 9.2 x10(3)/mcL Final    Mono # 08/19/2024 1.15  0.1 - 1.3 x10(3)/mcL Final    Eos # 08/19/2024 0.05  0 - 0.9 x10(3)/mcL Final    Baso # 08/19/2024 0.07  <=0.2 x10(3)/mcL Final    IG# 08/19/2024 0.02  0 - 0.04 x10(3)/mcL Final    IG% 08/19/2024 0.2  % Final         Assessment:       1. Colorectal cancer, stage IV    2. 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 Cancer related pain      Plan:       Patient with Stage IV rectal cancer, lung, liver, abdominal mariya metastases diagnosed in 2/2023. KRAS mutated.  Patient has failed FOLFOX avastin and FOLFIRI avastin.  Most Recent PET from 4/29/24 shows disease progression and CEA rising.    In review of previous Foundation One testing, noted to have an ALK rearrangement.   I did a literature review and there have been documented responses in colon cancers treatment with ALK inhibitors.   Case presented at [facility] clinical trials tumor board. No current clinical trials available, but treatment recommended with Alectinib.   Case presented at molecular tumor board on 6/4/24, agreed with trial of Alectinib.    Discussed conventional treatment with 3rd line Stivarga vs. Lonsurf which have not historically had good outcomes. Also discussed incurable nature of disease and continued palliative goals of treatment.  Would favor a more aggressive approach to treatment given his excellent functional status and young age. She did not think that Keytruda or immunotherapy would be effective given JESUS.     Patient started Alectinib 600mg PO BID on 5/22/24.  CT done 7/24/24 shows some stable disease, but mostly progression.  Recent CEA also increasing.     Recommend to change treatment to Fruquintinib per NCCN.   Sent referral to [facility] for clinical trial options. He is waiting on call.   Received 2 units PRBC on 7/29/24.   Patient started Fruquintinib 5mg daily X 21 days on/7 days off on  8/5/24.   Labs today show stable anemia. CBC otherwise good. CMP and CEA pending.   Baseline urine protein done 7/26/24.   Patient told to monitor BP daily, and he has cuff at home. BP slightly elevated today. He plans to keep track of readings and report back to us.   Plan to monitor labs every 2 weeks for now. Guidelines recommend liver function tests (AST, ALT, and total bilirubin) and evaluate for the presence of proteinuria at baseline and then periodically during treatment.   New onset cough that started prior to starting Fruquintinib. His last CT scan did reveal progressive pulmonary metastatic disease with a bulky endobronchial tumor anterior RUL, 4.5cm. Possibly explain his cough. I did offer a chest XR and he plans to go this week. Denies any fevers or other symptoms.   New back pain with spasms. Could be side effect of medication? Flexeril prescription sent to his pharmacy.   Will notify JILL Lim RD to get him set up with Boost Breeze or Ensure Clear supplements.     Continue Tramadol for pain. Refilled today.   Continue Xanax 0.25-0.5mg as needed for insomnia and anxiety.  Continue Promethazine at night as well. This is helping with his sleep.  Continue mediport flushes.   Patient has no FH of any cancer, may need genetic testing due to the APC mutation.    All questions answered at this time.      Angel Bailey, [facility]      Addendum:   CMP returned good. Urine protein dipstick 2+. Guidelines recommend 24 hour urine, if >2 gms of protein will need to hold the medication and consider dose reduction to 4mg. Patient called and notified. Orders placed. He will stop by to collect his supplies for the urine.   CEA went down.

## 2024-08-19 NOTE — PROGRESS NOTES
He started the new medication Fruquintinib on 8/5/24. His urine protein was 2+ today. What are the guidelines for monitoring? Thanks!

## 2024-08-19 NOTE — TELEPHONE ENCOUNTER
Left message on patient number and patient sisters number. Spoke to pharmacy. As per pharmacy: Patient has not been able to be contacted and Tramadol 100mg has not been picked up. Rx was submitted on 8/12/24.

## 2024-08-19 NOTE — TELEPHONE ENCOUNTER
The pharmacy and clinic staff have been unable to get in touch with the patient regarding both medications prescribed at office visit on 8/12 (Phenergan and Tramadol). Therefore, I have cancelled both prescriptions and we will need to have improved communication with the patient moving forward if we are going to fill controlled substances. Please verify with pharmacy that Phenergan and Tramadol prescriptions have been cancelled. Please attempt to notify patient.

## 2024-08-21 ENCOUNTER — HOSPITAL ENCOUNTER (OUTPATIENT)
Dept: RADIOLOGY | Facility: HOSPITAL | Age: 44
Discharge: HOME | End: 2024-08-21
Attending: NURSE PRACTITIONER
Payer: COMMERCIAL

## 2024-08-21 DIAGNOSIS — R05.1 ACUTE COUGH: ICD-10-CM

## 2024-08-21 PROCEDURE — 71046 X-RAY EXAM CHEST 2 VIEWS: CPT | Mod: TC

## 2024-08-21 NOTE — PROGRESS NOTES
Can you call and check to see how his cough is doing? His chest XR showed the density/opacity in the RUL that was seen on his CT of chest recently and is a known area of lung metastasis. This is unchanged. No evidence of anything acute (infectious). So his cough could be from this opacity.

## 2024-08-22 ENCOUNTER — DOCUMENTATION ONLY (OUTPATIENT)
Dept: HEMATOLOGY/ONCOLOGY | Facility: CLINIC | Age: 44
End: 2024-08-22
Payer: COMMERCIAL

## 2024-08-26 ENCOUNTER — OFFICE VISIT (OUTPATIENT)
Dept: PALLIATIVE MEDICINE | Facility: CLINIC | Age: 44
End: 2024-08-26
Payer: COMMERCIAL

## 2024-08-26 VITALS
BODY MASS INDEX: 29.06 KG/M2 | HEART RATE: 83 BPM | WEIGHT: 196.19 LBS | HEIGHT: 69 IN | DIASTOLIC BLOOD PRESSURE: 96 MMHG | SYSTOLIC BLOOD PRESSURE: 135 MMHG | OXYGEN SATURATION: 100 % | TEMPERATURE: 98 F | RESPIRATION RATE: 18 BRPM

## 2024-08-26 DIAGNOSIS — G47.00 INSOMNIA, UNSPECIFIED TYPE: ICD-10-CM

## 2024-08-26 DIAGNOSIS — T40.2X5A CONSTIPATION DUE TO OPIOID THERAPY: ICD-10-CM

## 2024-08-26 DIAGNOSIS — F32.A DEPRESSION, UNSPECIFIED DEPRESSION TYPE: ICD-10-CM

## 2024-08-26 DIAGNOSIS — Z51.5 ENCOUNTER FOR PALLIATIVE CARE: ICD-10-CM

## 2024-08-26 DIAGNOSIS — C19 COLORECTAL CANCER, STAGE IV: Primary | ICD-10-CM

## 2024-08-26 DIAGNOSIS — K59.03 CONSTIPATION DUE TO OPIOID THERAPY: ICD-10-CM

## 2024-08-26 DIAGNOSIS — G89.3 NEOPLASM RELATED PAIN: ICD-10-CM

## 2024-08-26 DIAGNOSIS — R11.0 NAUSEA: ICD-10-CM

## 2024-08-26 DIAGNOSIS — F41.9 ANXIETY: ICD-10-CM

## 2024-08-26 PROCEDURE — 99999 PR PBB SHADOW E&M-EST. PATIENT-LVL V: CPT | Mod: PBBFAC,,,

## 2024-08-26 RX ORDER — TRAMADOL HYDROCHLORIDE 100 MG/1
100 TABLET, COATED ORAL EVERY 6 HOURS PRN
Qty: 34 TABLET | Refills: 0 | COMMUNITY
Start: 2024-08-26 | End: 2024-08-26

## 2024-08-26 RX ORDER — PROMETHAZINE HYDROCHLORIDE 25 MG/1
25 TABLET ORAL EVERY 6 HOURS PRN
Qty: 34 TABLET | Refills: 0 | Status: SHIPPED | OUTPATIENT
Start: 2024-08-26

## 2024-08-26 RX ORDER — BENZONATATE 200 MG/1
200 CAPSULE ORAL 3 TIMES DAILY
COMMUNITY
Start: 2024-05-08

## 2024-08-26 RX ORDER — PROMETHAZINE HYDROCHLORIDE 25 MG/1
25 TABLET ORAL EVERY 6 HOURS PRN
Qty: 34 TABLET | Refills: 0 | COMMUNITY
Start: 2024-08-26 | End: 2024-08-26

## 2024-08-26 NOTE — PROGRESS NOTES
Palliative Clinic    Patient ID: [id number]     Chief Complaint: Follow-up      HPI:     Owen Swanson is a 44 y.o. male here today for follow up of symptom management and goals of care.  His primary diagnosis is stage IV colorectal cancer with metastases to lung, liver, abdominal lymph nodes. Currently being treated with Fruquintinb.  Per oncology note he has been referred to MD Guthrie for clinical trial options pending follow up.    Pain to lower back and radiates down left thigh posteriorly. Pain worsens with extended period of time. Pain at worst is rated 5-6/10 and relieved to 0 at best. He has stopped taking ibuprofen as directed. He started taking tylenol as needed, although he is requiring this infrequently. Continues to take Gabapentin 600 mg qHS. He is not requiring tramadol.   At previous office visit 8/12 pain was poorly controlled and tramadol was increased to 100 mg q.6 hours p.r.n. However, the patient never started the medication because pharmacy and office staff were unable to contact patient and the prescription was canceled. He reports having sorted out insurance issues and medication coverage. Per Oncology note, tramadol was refilled on 8/19. However, nurse called the pharmacy and there are no prescriptions pending. Discussed with patient to discontinue tramadol. He does complain of intermittent/infrequent back spasms. Reports these are across the entire back bilaterally. Oncology prescribed  Flexeril on 8/19. He has not taken this medication yet, but understands to use it PRN for spasms.    Insomnia is well controlled. He is able to fall asleep and sleep through the night. Continue current regimen of Xanax, promethazine, gabapentin, and THC tincture for sleep.    Anxiety is well controlled. Continue Xanax 0.25 mg b.i.d. p.r.n. Well controlled and gaining interest back in daily activities. He is taking Xanax primarily once daily.    Constipation is improved and well controlled with  over-the-counter stool softener. He is requiring stool softener infrequently and is having consistent bowel movements.    Nausea appears to be overall controlled with nightly promethazine. He has had infrequent episodes of nausea during the day, which have been self-limiting and not required medications. Refill Promethazine.    Nonproductive, dry, intermittent cough persists. Says he started Tessalon Pearls with some improvement. Asks about CXR that was completed d/t cough. Reviewed imaging results and Oncology note regarding unchanged appearance of RUL opacity.    Overall he reports a notable improvement in symptoms since change in oncology regimen.    Goals of care:  Remain to continue seeking treatment options, pending appointment with MD Guthrie. Given ACP informational packet and [facility]. Reviewed options on [facility]. Encouraged discussions with wife, who is his legal next of kin and surrogate decision maker. He will discuss with his wife if she wishes to share responsibility as HCPOA with his sister or mother. Ongoing discussions regarding ACP and goals of care.    Past Medical History:   Diagnosis Date    Anxiety     Hypertension         History reviewed. No pertinent surgical history.     Social History     Tobacco Use    Smoking status: Former     Types: Cigarettes    Smokeless tobacco: Never   Substance and Sexual Activity    Alcohol use: Not Currently    Drug use: Yes     Types: Marijuana    Sexual activity: Not on file        Current Outpatient Medications   Medication Instructions    albuterol (PROVENTIL) 2.5 mg, Inhalation, Every 6 hours PRN    ALPRAZolam (XANAX) 0.25 mg, Oral, 2 times daily PRN    amlodipine-benazepril 10-20mg (LOTREL) 10-20 mg per capsule 1 capsule, Oral, Daily    ascorbic acid, vitamin C, (VITAMIN C) 1000 MG tablet 4 tablets, Oral, Nightly    benzonatate (TESSALON) 200 mg, Oral, 3 times daily    cholecalciferol, vitamin D3, (VITAMIN D3) 25 mcg (1,000 unit) capsule 1 capsule, Oral,  "Nightly    cyanocobalamin (VITAMIN B-12) 100 MCG tablet 1 tablet, Oral, Nightly    cyclobenzaprine (FLEXERIL) 5 mg, Oral, 3 times daily PRN    fluoride, sodium, (PREVIDENT 5000 BOOSTER PLUS) 1.1 % Pste     fruquintinib 5 mg Cap Take 1 capsule (5 mg) by mouth once daily for 21 days, then off for 7 days    gabapentin (NEURONTIN) 600 mg, Oral, Nightly    magnesium 200 mg Tab 2 tablets, Oral, Nightly    NON FORMULARY MEDICATION 2 tablets, Oral, Daily, Iron blood builder    NON FORMULARY MEDICATION 1 Piece, Oral, Daily PRN, THC gummies. Patient stated 1 piece or less per day.     NON FORMULARY MEDICATION Quoc Gage oil (THC oil)    omeprazole (PRILOSEC) 20 MG capsule 1 capsule, Oral, Every morning    ondansetron (ZOFRAN) 4 mg, Oral, Every 8 hours PRN    promethazine (PHENERGAN) 25 mg, Oral, Every 6 hours PRN    zinc acetate 50 mg (zinc) Cap 1 tablet, Oral, Nightly       Review of patient's allergies indicates:  No Known Allergies     Patient Care Team:  Mao Crook DO as PCP - General (Internal Medicine)  Lamar Mc PharmD as Pharmacist (Pharmacist)     Subjective:     Review of Systems   Constitutional: Negative.    HENT: Negative.     Respiratory: Negative.     Cardiovascular: Negative.    Gastrointestinal: Negative.    Genitourinary: Negative.    Musculoskeletal:  Positive for back pain (spasms; lower back radiating into left leg/thigh).   Skin: Negative.    Neurological:  Positive for weakness.   Psychiatric/Behavioral: Negative.         12 point review of systems conducted, negative except as stated in the history of present illness. See HPI for details.    Objective:     Visit Vitals  BP (!) 135/96 (BP Location: Left arm, Patient Position: Sitting, BP Method: Medium (Automatic))   Pulse 83   Temp 98.3 °F (36.8 °C) (Oral)   Resp 18   Ht 5' 9" (1.753 m)   Wt 89 kg (196 lb 3.2 oz)   SpO2 100%   BMI 28.97 kg/m²      Vitals:    08/26/24 1116   PainSc: 0-No pain       Physical Exam  Constitutional:      "  General: He is not in acute distress.     Appearance: He is ill-appearing.   HENT:      Head: Normocephalic.      Mouth/Throat:      Mouth: Mucous membranes are moist.   Eyes:      Extraocular Movements: Extraocular movements intact.   Cardiovascular:      Rate and Rhythm: Normal rate and regular rhythm.      Heart sounds: Normal heart sounds. No murmur heard.  Pulmonary:      Effort: Pulmonary effort is normal. No respiratory distress.      Breath sounds: Normal breath sounds.   Abdominal:      General: There is no distension.      Palpations: Abdomen is soft.   Musculoskeletal:      Cervical back: Normal range of motion.      Right lower leg: No edema.      Left lower leg: No edema.   Skin:     General: Skin is warm and dry.   Neurological:      General: No focal deficit present.      Mental Status: He is alert and oriented to person, place, and time.   Psychiatric:         Behavior: Behavior normal.         Review of Symptoms      Symptom Assessment (ESAS 0-10 Scale)  Pain:  0  Dyspnea:  0  Anxiety:  0  Nausea:  0  Depression:  0  Anorexia:  0  Fatigue:  0  Insomnia:  0  Restlessness:  0  Agitation:  0         Bowel Management Plan (BMP):  Yes      Psychosocial/Cultural:   See Palliative Psychosocial Note: Yes  **Primary  to Follow**  Palliative Care  Consult: No        Labs Reviewed:     Chemistry:  Lab Results   Component Value Date     08/19/2024    K 4.8 08/19/2024    BUN 10.9 08/19/2024    CREATININE 0.95 08/19/2024    EGFRNORACEVR >60 08/19/2024    GLUCOSE 113 (H) 08/19/2024    CALCIUM 9.7 08/19/2024    ALKPHOS 314 (H) 08/19/2024    LABPROT 7.9 08/19/2024    ALBUMIN 3.6 08/19/2024    AST 57 (H) 08/19/2024    ALT 51 08/19/2024        Hematology:  Lab Results   Component Value Date    WBC 10.54 08/19/2024    HGB 12.4 (L) 08/19/2024    HCT 37.4 (L) 08/19/2024     08/19/2024       Urine:  Lab Results   Component Value Date    APPEARANCEUA Clear 05/28/2024    SGUA 1.027  05/28/2024    PROTEINUA 2+ (A) 08/19/2024    KETONESUA Trace (A) 05/28/2024    LEUKOCYTESUR Negative 05/28/2024    RBCUA 0-5 05/28/2024    WBCUA 0-5 05/28/2024    BACTERIA None Seen 05/28/2024    SQEPUA Trace 05/28/2024        Advanced Care Planning:     Advance Care Planning     Date: 08/26/2024    [facility]  I engaged the patient in a voluntary conversation about advance care planning and we specifically addressed what the goals of care would be moving forward, in light of the patient's change in clinical status, specifically current condition.  We did specifically address the patient's likely prognosis, which is fair .  We explored the patient's values and preferences for future care.  The patient endorses that what is most important right now is to focus on curative/life-prolongation (regardless of treatment burdens)    Accordingly, we have decided that the best plan to meet the patient's goals includes continuing with treatment       Advance Directives:     Decision Making:  Patient answered questions  Goals of Care: What is most important right now is to focus on symptom/pain control, curative/life-prolongation (regardless of treatment burdens). Accordingly, we have decided that the best plan to meet the patient's goals includes continuing with treatment.    Assessment:       ICD-10-CM ICD-9-CM   1. Colorectal cancer, stage IV  C19 154.0   2. Encounter for palliative care  Z51.5 V66.7   3. Neoplasm related pain  G89.3 338.3   4. Depression, unspecified depression type  F32.A 311   5. Constipation due to opioid therapy  K59.03 564.09    T40.2X5A E935.2   6. Anxiety  F41.9 300.00   7. Insomnia, unspecified type  G47.00 780.52   8. Nausea  R11.0 787.02        Plan:     1. Colorectal cancer, stage IV   - Per Hematology/Oncology recommendations  2. Encounter for palliative care   - Ongoing discussions regarding goals of care   - Current plan is to seek all treatment options   - Pending evaluation by MD Guthrie  3.  Neoplasm related pain   - Continue current pain regimen-- tylenol PRN and gabapentin 600 mg qHS   - Avoid NSAIDs   - Flexeril PRN muscle spasms  4. Depression, unspecified depression type   - Improved with improvement in symptoms related to oncology treatment  5. Constipation due to opioid therapy   - Resolved, continue PRN OTC stool softeners  6. Anxiety   - Continue current regimen with Xanax PRN  7. Insomnia, unspecified type   - Well-controlled, continue current regimen  8. Nausea  - Refill promethazine (PHENERGAN) 25 MG tablet; Take 1 tablet (25 mg total) by mouth every 6 (six) hours as needed for Nausea.  Dispense: 34 tablet; Refill: 0      Discussed with patient that in order to safely prescribe controlled substances, staff will need to be able to contact patient. He verbalized understanding.    [x] Discussed risks associated with narcotic use, including sedation, nausea, and constipation. Sedation precautions include: not driving or operating heavy machinery after initiating medication. Take medication with a meal or snack to prevent nausea. Discussed addition of stool softener to prevent opioid induced constipation.    I have reviewed the narcotic prescription data via .    Follow up in about 1 month (around 9/26/2024) for Follow-up with NP, Follow-up with MD. In addition to their scheduled follow up, the patient has also been instructed to follow up on as needed basis.     Future Appointments   Date Time Provider Department Center   9/5/2024  [facility]00 [facility]   9/5/2024  1[facility]30 [facility]   9/6/2024  8:15 AM INJECTION CHAIR 02, [facility]   9/25/2024 10:30 AM Porfirio Owen MD [facility] OP PAL [facility]   12/6/2024  8:00 AM INJECTION CHAIR 01, [facility] CHEMOTHERAPY INFUSION [facility] CHEMO [facility]        Mell Pierson NP

## 2024-08-29 NOTE — PROGRESS NOTES
Subjective:       Patient ID: Owen Swanson is a 44 y.o. male.    Rectal Cancer Stage IV--Diagnosed 23  Biopsy/pathology:   1. Colonoscopy 23--large fungating rectal mass 5-10cm from anal verge to approximately 20cm, biopsy with well differentiated colorectal adenocarcinoma.   2. Bronchoscopy with biopsy of endobronchial mass RUL biopsy done 3/16/23--metastatic poorly differentiated colonic adenocarcinoma. Foundation One with PD-L1 0%, ALK rearrangement, KRAS G12V, AKT2 amplification, APCV, FBXW7, CCND3 amplification, TP53, VEGFA amplification, TMB 8, MSI-high not detected, elevated tumor fraction.   3. Liquid Biopsy Guardant 360 done 24--KRAS G12V, APC , TP53 R248W, MSI high not detected, NRAS, BRAF, Her2 and NTRK negative.  Guardant 360 liquid biopsy done 24--KRAS G12V, APC and TP53 mutations.   Imagin. PET/CT 24--interval enlargement and increased metabolic activity of primary rectal mass, c/w local disease progression, stable hypermetabolic hepatic metastatic disease, grossly stable infiltrating tumor in RUL bronchus, suspected subtle interval enlargement of subcentimeter nodule in TIGRE and RLL.   2. CT C/A/P w/ contrast done at [facility] 3/18/24--Similar to slight increase in size of left upper and right lower lobe pulmonary nodules. Grossly similar right upper lobe peribronchial lesion and nodularity, similar to slight increase in size of conglomerate maria isabel hepatic and peripancreatic lymphadenopathy, grossly similar metastatic liver lesions.   3. PET/CT 24--Findings concerning for progression of primary rectal mass and metastatic disease.   4. CT A/P done 24--long segment rectal wall thickening and nodularity compatible with known rectal malignancy, multiple calcified abdominopelvic lymph nodes compatible with metastatic nodes. Reference portal caval lymph node measures 2.4 cm short axis, Multiple bilobar hypodense, variably calcified hepatic masses compatible with  colorectal metastases. Largest discrete lesion in the right lobe measures approximately 8.7 cm, findings similar to prior.   5. CT C/A/P done 7/24/24--progressive pulmonary metastatic disease, bulky branching endobronchial tumor anterior RUL, appears enlarged but difficult to measure, 4.5cm (prior 2.5cm), nodule at TIGRE measures 10mm (prior 7mm), nodule RLL measures 16mm (prior 12mm), progressive hepatic metastatic disease, few lesions enlarged two in hepatic segment VI measure 2.6 and 4.4cm (prior 1.9 and 4cm), majority appear stable, large pathologic lymph nodes in maria isabel hepatis have progressed, for example LN anterior to celiac artery measures 2.8cm vs. Prior 2.3cm, new 2cm splenic lesion, potentially metastatic, large rectal mass 8cm, similar, invading the mesorectal fat, grossly stable partially calcified metastatic lymph nodes in mesorectal fat and retroperitoneum.     Treatment history:  FOLFOX/Avastin 2/22/23 followed by maintenance.  Progression in 11/2023.   FOLFIRI/Avastin 11/2023--4/29/24 (stopped due to progression).  Alectinib 5/22/24--7/26/24 (stopped due to progression).    CEA:   05/09/24--904  06/12/24--1,131.53  07/01/24--1,191.49  07/16/24--1,496.72  08/19/24--1,073.40    Current treatment plan:   Fruquintinib 5mg daily X 21 days on/7 days off started on 8/5/24.   Cycle 2 started on 9/4/24.     Chief Complaint: Diarrhea and Back Pain    HPI  Patient presents for follow-up of Stage IV rectal cancer. He is doing okay today. Started Fruquintinib on 8/5/24. Recently started his 2nd cycle. So far, he appears to be tolerating fairly well. Complains of ongoing fatigue and abdominal pain. States he has noticed more back pain since starting the medication (listed as side effect with 15%). Reports that he is sleeping better, taking Promethazine, Tramadol and Xanax. His appetite is still down. Weight stable today. His cough has resolved. He expressed interest in going to [facility] for clinical trial options and  referral has been made. He is awaiting to hear back from them.     Past Medical History:   Diagnosis Date    Anxiety     Hypertension       History reviewed. No pertinent surgical history.  Family History   Problem Relation Name Age of Onset    Hypertension Mother      Coronary artery disease Father      Hypertension Father      Diabetes Father       Social History     Socioeconomic History    Marital status:    Tobacco Use    Smoking status: Former     Types: Cigarettes    Smokeless tobacco: Never   Substance and Sexual Activity    Alcohol use: Not Currently    Drug use: Yes     Types: Marijuana       Review of patient's allergies indicates:  No Known Allergies   Current Outpatient Medications on File Prior to Visit   Medication Sig Dispense Refill    albuterol (PROVENTIL) 2.5 mg /3 mL (0.083 %) nebulizer solution Inhale 2.5 mg into the lungs every 6 (six) hours as needed.      ALPRAZolam (XANAX) 0.25 MG tablet Take 1 tablet (0.25 mg total) by mouth 2 (two) times daily as needed for Anxiety. 60 tablet 1    amlodipine-benazepril 10-20mg (LOTREL) 10-20 mg per capsule Take 1 capsule by mouth once daily.      ascorbic acid, vitamin C, (VITAMIN C) 1000 MG tablet Take 4 tablets by mouth every evening.      cholecalciferol, vitamin D3, (VITAMIN D3) 25 mcg (1,000 unit) capsule Take 1 capsule by mouth every evening.      cyanocobalamin (VITAMIN B-12) 100 MCG tablet Take 1 tablet by mouth every evening.      fruquintinib 5 mg Cap Take 1 capsule (5 mg) by mouth once daily for 21 days, then off for 7 days 21 capsule 6    gabapentin (NEURONTIN) 300 MG capsule Take 600 mg by mouth every evening.      magnesium 200 mg Tab Take 2 tablets by mouth every evening.      NON FORMULARY MEDICATION Take 2 tablets by mouth Daily. Iron blood builder      NON FORMULARY MEDICATION Take 1 Piece by mouth daily as needed (pain). THC gummies. Patient stated 1 piece or less per day.      NON FORMULARY MEDICATION Quoc Gage oil (THC oil)    "   omeprazole (PRILOSEC) 20 MG capsule Take 1 capsule by mouth every morning.      ondansetron (ZOFRAN) 4 MG tablet Take 4 mg by mouth every 8 (eight) hours as needed.      promethazine (PHENERGAN) 25 MG tablet Take 1 tablet (25 mg total) by mouth every 6 (six) hours as needed for Nausea. 34 tablet 0    zinc acetate 50 mg (zinc) Cap Take 1 tablet by mouth every evening.      benzonatate (TESSALON) 200 MG capsule Take 200 mg by mouth 3 (three) times daily. (Patient not taking: Reported on 9/5/2024)      fluoride, sodium, (PREVIDENT 5000 BOOSTER PLUS) 1.1 % Pste  (Patient not taking: Reported on 9/5/2024)       No current facility-administered medications on file prior to visit.      Review of Systems   Constitutional:  Positive for appetite change and fatigue. Unexpected weight change: weight fluctuates.  HENT:  Negative for mouth sores.    Eyes:  Negative for visual disturbance.   Respiratory:  Negative for shortness of breath.    Cardiovascular:  Negative for chest pain.   Gastrointestinal:  Positive for abdominal pain and constipation. Negative for diarrhea.   Musculoskeletal:  Positive for myalgias. Negative for back pain.   Neurological:  Negative for headaches.   Hematological:  Negative for adenopathy.   Psychiatric/Behavioral:  The patient is nervous/anxious.          Vitals:    09/05/24 1332   BP: (!) 141/96   Pulse: 79   Resp: 14   Temp: 98.3 °F (36.8 °C)   TempSrc: Oral   SpO2: 99%   Weight: 89.4 kg (197 lb 3.2 oz)   Height: 5' 9" (1.753 m)     Physical Exam  Constitutional:       General: He is awake.      Appearance: Normal appearance. He is normal weight.   HENT:      Head: Normocephalic and atraumatic.      Nose: Nose normal.      Mouth/Throat:      Mouth: Mucous membranes are moist.   Eyes:      General: Vision grossly intact.      Extraocular Movements: Extraocular movements intact.      Conjunctiva/sclera: Conjunctivae normal.   Cardiovascular:      Rate and Rhythm: Normal rate and regular rhythm. "      Heart sounds: Normal heart sounds.   Pulmonary:      Effort: Pulmonary effort is normal.      Breath sounds: Normal breath sounds.      Comments: No wheezing today.  Chest:      Comments: Left chest wall mediport in place  Abdominal:      General: Abdomen is protuberant. Bowel sounds are normal. There is no distension.      Palpations: Abdomen is soft.      Tenderness: There is no abdominal tenderness.   Musculoskeletal:      Cervical back: Normal range of motion and neck supple.      Right lower leg: No edema.      Left lower leg: No edema.   Lymphadenopathy:      Cervical: No cervical adenopathy.      Upper Body:      Right upper body: No supraclavicular adenopathy.      Left upper body: No supraclavicular adenopathy.   Skin:     General: Skin is warm.   Neurological:      Mental Status: He is alert and oriented to person, place, and time.      Motor: Motor function is intact.   Psychiatric:         Mood and Affect: Mood normal.         Speech: Speech normal.         Behavior: Behavior is cooperative.         Judgment: Judgment normal.       Lab Visit on 09/05/2024   Component Date Value Ref Range Status    WBC 09/05/2024 8.40  4.50 - 11.50 x10(3)/mcL Final    RBC 09/05/2024 4.20 (L)  4.70 - 6.10 x10(6)/mcL Final    Hgb 09/05/2024 12.6 (L)  14.0 - 18.0 g/dL Final    Hct 09/05/2024 38.4 (L)  42.0 - 52.0 % Final    MCV 09/05/2024 91.4  80.0 - 94.0 fL Final    MCH 09/05/2024 30.0  27.0 - 31.0 pg Final    MCHC 09/05/2024 32.8 (L)  33.0 - 36.0 g/dL Final    RDW 09/05/2024 13.8  11.5 - 17.0 % Final    Platelet 09/05/2024 242  130 - 400 x10(3)/mcL Final    MPV 09/05/2024 10.3  7.4 - 10.4 fL Final    Neut % 09/05/2024 71.7  % Final    Lymph % 09/05/2024 16.9  % Final    Mono % 09/05/2024 8.3  % Final    Eos % 09/05/2024 2.6  % Final    Basophil % 09/05/2024 0.4  % Final    Lymph # 09/05/2024 1.42  0.6 - 4.6 x10(3)/mcL Final    Neut # 09/05/2024 6.02  2.1 - 9.2 x10(3)/mcL Final    Mono # 09/05/2024 0.70  0.1 - 1.3  x10(3)/mcL Final    Eos # 09/05/2024 0.22  0 - 0.9 x10(3)/mcL Final    Baso # 09/05/2024 0.03  <=0.2 x10(3)/mcL Final    IG# 09/05/2024 0.01  0 - 0.04 x10(3)/mcL Final    IG% 09/05/2024 0.1  % Final         Assessment:       1. Colorectal cancer, stage IV    2. 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 Cancer related pain      Plan:       Patient with Stage IV rectal cancer, lung, liver, abdominal mariya metastases diagnosed in 2/2023. KRAS mutated.  Patient has failed FOLFOX avastin and FOLFIRI avastin.  Most Recent PET from 4/29/24 shows disease progression and CEA rising.    In review of previous [facility] testing, noted to have an ALK rearrangement.   I did a literature review and there have been documented responses in colon cancers treatment with ALK inhibitors.   Case presented at [facility] clinical trials tumor board. No current clinical trials available, but treatment recommended with Alectinib.   Case presented at molecular tumor board on 6/4/24, agreed with trial of Alectinib.    Discussed conventional treatment with 3rd line Stivarga vs. Lonsurf which have not historically had good outcomes. Also discussed incurable nature of disease and continued palliative goals of treatment.  Would favor a more aggressive approach to treatment given his excellent functional status and young age. She did not think that Keytruda or immunotherapy would be effective given JESUS.     Patient started Alectinib 600mg PO BID on 5/22/24.  CT done 7/24/24 shows some stable disease, but mostly progression.  Recent CEA also increasing.     Recommend to change treatment to Fruquintinib per [facility].   Sent referral to [facility] for clinical trial options. He is waiting on call.   Received 2 units PRBC on 7/29/24.   Patient started Fruquintinib 5mg daily X 21 days on/7 days off on 8/5/24.   Labs today show improving anemia. CBC otherwise good. CMP pending.   His CEA has been decreasing, pending from today.    Baseline urine protein done 7/26/24. Repeat a few weeks later showed 2+ urine. Guidelines recommended checking a 24 hour urine protein.   24 hour urine with 449gm of protein. Continue with current dose of medication now. Plan to continue with periodic urine protein testing.   Patient told to monitor BP daily, and he has cuff at home. BP slightly elevated since starting the Fruquintinib. He plans to keep track of readings and report back to us. Currently considered a Grade 1 HTN classification. Guideline recommendations state:   Withhold fruquintinib for persistent grade 3 hypertension despite optimal anti-hypertensive therapy.  If hypertension fully resolves or recovers to grade 1, resume fruquintinib at the next lower dose level.  For Grade 4 hypertension: Initiate or adjust anti-hypertensive therapy as appropriate.  Permanently discontinue fruquintinib.   Can always increase the Lotrel to 10/40mg daily before adding another medication.   Plan to monitor labs every 2 weeks for now. Guidelines recommend liver function tests (AST, ALT, and total bilirubin) and evaluate for the presence of proteinuria at baseline and then periodically during treatment.   Will have him follow-up in 4 weeks with labs.     Denies any fevers or other symptoms.   New back pain with spasms. Could be side effect of medication? Flexeril prescription sent to his pharmacy that is helping.   Continue Tramadol for pain.   Continue Xanax 0.25-0.5mg as needed for insomnia and anxiety.  Continue Promethazine at night as well. This is helping with his sleep.  Continue mediport flushes.   Continue supplements with Miles Perret-  Boost Breeze or Ensure Clear supplements.   Patient has no FH of any cancer, may need genetic testing due to the APC mutation.    All questions answered at this time.      Angel Bailey, [facility]        Addendum:   CEA increased to 1349.70. Will also go ahead and increase the Lotrel now to 10/40mg daily. New prescription sent  to his pharmacy. Patient called and notified.

## 2024-08-30 PROCEDURE — 84156 ASSAY OF PROTEIN URINE: CPT | Performed by: NURSE PRACTITIONER

## 2024-09-05 ENCOUNTER — OFFICE VISIT (OUTPATIENT)
Dept: HEMATOLOGY/ONCOLOGY | Facility: CLINIC | Age: 44
End: 2024-09-05
Payer: COMMERCIAL

## 2024-09-05 ENCOUNTER — INFUSION (OUTPATIENT)
Dept: INFUSION THERAPY | Facility: HOSPITAL | Age: 44
End: 2024-09-05
Attending: INTERNAL MEDICINE
Payer: COMMERCIAL

## 2024-09-05 ENCOUNTER — LAB VISIT (OUTPATIENT)
Dept: LAB | Facility: HOSPITAL | Age: 44
End: 2024-09-05
Attending: INTERNAL MEDICINE
Payer: COMMERCIAL

## 2024-09-05 VITALS
SYSTOLIC BLOOD PRESSURE: 141 MMHG | WEIGHT: 197.19 LBS | HEIGHT: 69 IN | HEART RATE: 79 BPM | DIASTOLIC BLOOD PRESSURE: 96 MMHG | TEMPERATURE: 98 F | OXYGEN SATURATION: 99 % | BODY MASS INDEX: 29.2 KG/M2 | RESPIRATION RATE: 14 BRPM

## 2024-09-05 DIAGNOSIS — Z79.899 IMMUNODEFICIENCY DUE TO DRUGS: ICD-10-CM

## 2024-09-05 DIAGNOSIS — D84.821 IMMUNODEFICIENCY DUE TO DRUGS: ICD-10-CM

## 2024-09-05 DIAGNOSIS — C78.00 COLON CANCER METASTASIZED TO LUNG: Primary | ICD-10-CM

## 2024-09-05 DIAGNOSIS — I10 HYPERTENSION, UNSPECIFIED TYPE: ICD-10-CM

## 2024-09-05 DIAGNOSIS — G89.3 CANCER RELATED PAIN: ICD-10-CM

## 2024-09-05 DIAGNOSIS — C18.9 COLON CANCER METASTASIZED TO LUNG: ICD-10-CM

## 2024-09-05 DIAGNOSIS — I10 HYPERTENSION, UNSPECIFIED TYPE: Primary | ICD-10-CM

## 2024-09-05 DIAGNOSIS — C19 COLORECTAL CANCER, STAGE IV: Primary | ICD-10-CM

## 2024-09-05 DIAGNOSIS — C78.00 COLON CANCER METASTASIZED TO LUNG: ICD-10-CM

## 2024-09-05 DIAGNOSIS — C19 COLORECTAL CANCER, STAGE IV: ICD-10-CM

## 2024-09-05 DIAGNOSIS — C18.9 COLON CANCER METASTASIZED TO LUNG: Primary | ICD-10-CM

## 2024-09-05 LAB
ALBUMIN SERPL-MCNC: 3.7 G/DL (ref 3.5–5)
ALBUMIN/GLOB SERPL: 0.9 RATIO (ref 1.1–2)
ALP SERPL-CCNC: 353 UNIT/L (ref 40–150)
ALT SERPL-CCNC: 41 UNIT/L (ref 0–55)
ANION GAP SERPL CALC-SCNC: 7 MEQ/L
AST SERPL-CCNC: 42 UNIT/L (ref 5–34)
BASOPHILS # BLD AUTO: 0.03 X10(3)/MCL
BASOPHILS NFR BLD AUTO: 0.4 %
BILIRUB SERPL-MCNC: 0.5 MG/DL
BUN SERPL-MCNC: 15.5 MG/DL (ref 8.9–20.6)
CALCIUM SERPL-MCNC: 10.1 MG/DL (ref 8.4–10.2)
CEA SERPL-MCNC: 1349.7 NG/ML (ref 0–3)
CHLORIDE SERPL-SCNC: 109 MMOL/L (ref 98–107)
CO2 SERPL-SCNC: 27 MMOL/L (ref 22–29)
CREAT SERPL-MCNC: 0.86 MG/DL (ref 0.73–1.18)
CREAT/UREA NIT SERPL: 18
EOSINOPHIL # BLD AUTO: 0.22 X10(3)/MCL (ref 0–0.9)
EOSINOPHIL NFR BLD AUTO: 2.6 %
ERYTHROCYTE [DISTWIDTH] IN BLOOD BY AUTOMATED COUNT: 13.8 % (ref 11.5–17)
GFR SERPLBLD CREATININE-BSD FMLA CKD-EPI: >60 ML/MIN/1.73/M2
GLOBULIN SER-MCNC: 4 GM/DL (ref 2.4–3.5)
GLUCOSE SERPL-MCNC: 98 MG/DL (ref 74–100)
HCT VFR BLD AUTO: 38.4 % (ref 42–52)
HGB BLD-MCNC: 12.6 G/DL (ref 14–18)
IMM GRANULOCYTES # BLD AUTO: 0.01 X10(3)/MCL (ref 0–0.04)
IMM GRANULOCYTES NFR BLD AUTO: 0.1 %
LYMPHOCYTES # BLD AUTO: 1.42 X10(3)/MCL (ref 0.6–4.6)
LYMPHOCYTES NFR BLD AUTO: 16.9 %
MCH RBC QN AUTO: 30 PG (ref 27–31)
MCHC RBC AUTO-ENTMCNC: 32.8 G/DL (ref 33–36)
MCV RBC AUTO: 91.4 FL (ref 80–94)
MONOCYTES # BLD AUTO: 0.7 X10(3)/MCL (ref 0.1–1.3)
MONOCYTES NFR BLD AUTO: 8.3 %
NEUTROPHILS # BLD AUTO: 6.02 X10(3)/MCL (ref 2.1–9.2)
NEUTROPHILS NFR BLD AUTO: 71.7 %
PLATELET # BLD AUTO: 242 X10(3)/MCL (ref 130–400)
PMV BLD AUTO: 10.3 FL (ref 7.4–10.4)
POTASSIUM SERPL-SCNC: 4.6 MMOL/L (ref 3.5–5.1)
PROT SERPL-MCNC: 7.7 GM/DL (ref 6.4–8.3)
RBC # BLD AUTO: 4.2 X10(6)/MCL (ref 4.7–6.1)
SODIUM SERPL-SCNC: 143 MMOL/L (ref 136–145)
WBC # BLD AUTO: 8.4 X10(3)/MCL (ref 4.5–11.5)

## 2024-09-05 PROCEDURE — 99215 OFFICE O/P EST HI 40 MIN: CPT | Mod: S$GLB,,, | Performed by: NURSE PRACTITIONER

## 2024-09-05 PROCEDURE — 3077F SYST BP >= 140 MM HG: CPT | Mod: CPTII,S$GLB,, | Performed by: NURSE PRACTITIONER

## 2024-09-05 PROCEDURE — 99999 PR PBB SHADOW E&M-EST. PATIENT-LVL III: CPT | Mod: PBBFAC,,, | Performed by: NURSE PRACTITIONER

## 2024-09-05 PROCEDURE — 1160F RVW MEDS BY RX/DR IN RCRD: CPT | Mod: CPTII,S$GLB,, | Performed by: NURSE PRACTITIONER

## 2024-09-05 PROCEDURE — 36415 COLL VENOUS BLD VENIPUNCTURE: CPT

## 2024-09-05 PROCEDURE — 82378 CARCINOEMBRYONIC ANTIGEN: CPT

## 2024-09-05 PROCEDURE — 1159F MED LIST DOCD IN RCRD: CPT | Mod: CPTII,S$GLB,, | Performed by: NURSE PRACTITIONER

## 2024-09-05 PROCEDURE — 4010F ACE/ARB THERAPY RXD/TAKEN: CPT | Mod: CPTII,S$GLB,, | Performed by: NURSE PRACTITIONER

## 2024-09-05 PROCEDURE — 63600175 PHARM REV CODE 636 W HCPCS: Performed by: INTERNAL MEDICINE

## 2024-09-05 PROCEDURE — 96523 IRRIG DRUG DELIVERY DEVICE: CPT

## 2024-09-05 PROCEDURE — 3080F DIAST BP >= 90 MM HG: CPT | Mod: CPTII,S$GLB,, | Performed by: NURSE PRACTITIONER

## 2024-09-05 PROCEDURE — 85025 COMPLETE CBC W/AUTO DIFF WBC: CPT

## 2024-09-05 PROCEDURE — A4216 STERILE WATER/SALINE, 10 ML: HCPCS | Performed by: INTERNAL MEDICINE

## 2024-09-05 PROCEDURE — 80053 COMPREHEN METABOLIC PANEL: CPT

## 2024-09-05 PROCEDURE — 3008F BODY MASS INDEX DOCD: CPT | Mod: CPTII,S$GLB,, | Performed by: NURSE PRACTITIONER

## 2024-09-05 PROCEDURE — 25000003 PHARM REV CODE 250: Performed by: INTERNAL MEDICINE

## 2024-09-05 RX ORDER — SODIUM CHLORIDE 0.9 % (FLUSH) 0.9 %
10 SYRINGE (ML) INJECTION
Status: DISCONTINUED | OUTPATIENT
Start: 2024-09-05 | End: 2024-09-05 | Stop reason: HOSPADM

## 2024-09-05 RX ORDER — AMLODIPINE AND BENAZEPRIL HYDROCHLORIDE 10; 40 MG/1; MG/1
1 CAPSULE ORAL DAILY
Qty: 30 CAPSULE | Refills: 3 | Status: SHIPPED | OUTPATIENT
Start: 2024-09-05 | End: 2025-01-03

## 2024-09-05 RX ORDER — SODIUM CHLORIDE 0.9 % (FLUSH) 0.9 %
10 SYRINGE (ML) INJECTION
OUTPATIENT
Start: 2024-11-28

## 2024-09-05 RX ORDER — HEPARIN 100 UNIT/ML
500 SYRINGE INTRAVENOUS
OUTPATIENT
Start: 2024-11-28

## 2024-09-05 RX ORDER — HEPARIN 100 UNIT/ML
500 SYRINGE INTRAVENOUS
Status: DISCONTINUED | OUTPATIENT
Start: 2024-09-05 | End: 2024-09-05 | Stop reason: HOSPADM

## 2024-09-05 RX ADMIN — SODIUM CHLORIDE, PRESERVATIVE FREE 10 ML: 5 INJECTION INTRAVENOUS at 02:09

## 2024-09-05 RX ADMIN — HEPARIN 500 UNITS: 100 SYRINGE at 02:09

## 2024-09-19 ENCOUNTER — LAB VISIT (OUTPATIENT)
Dept: LAB | Facility: HOSPITAL | Age: 44
End: 2024-09-19
Attending: NURSE PRACTITIONER
Payer: COMMERCIAL

## 2024-09-19 DIAGNOSIS — G89.3 CANCER RELATED PAIN: ICD-10-CM

## 2024-09-19 DIAGNOSIS — C19 COLORECTAL CANCER, STAGE IV: ICD-10-CM

## 2024-09-19 DIAGNOSIS — D84.821 IMMUNODEFICIENCY DUE TO DRUGS: ICD-10-CM

## 2024-09-19 DIAGNOSIS — Z79.899 IMMUNODEFICIENCY DUE TO DRUGS: ICD-10-CM

## 2024-09-19 DIAGNOSIS — C78.00 COLON CANCER METASTASIZED TO LUNG: ICD-10-CM

## 2024-09-19 DIAGNOSIS — C18.9 COLON CANCER METASTASIZED TO LUNG: ICD-10-CM

## 2024-09-19 LAB
ALBUMIN SERPL-MCNC: 3.8 G/DL (ref 3.5–5)
ALBUMIN/GLOB SERPL: 1 RATIO (ref 1.1–2)
ALP SERPL-CCNC: 293 UNIT/L (ref 40–150)
ALT SERPL-CCNC: 50 UNIT/L (ref 0–55)
ANION GAP SERPL CALC-SCNC: 10 MEQ/L
AST SERPL-CCNC: 51 UNIT/L (ref 5–34)
BASOPHILS # BLD AUTO: 0.03 X10(3)/MCL
BASOPHILS NFR BLD AUTO: 0.5 %
BILIRUB SERPL-MCNC: 0.5 MG/DL
BUN SERPL-MCNC: 10.6 MG/DL (ref 8.9–20.6)
CALCIUM SERPL-MCNC: 9.7 MG/DL (ref 8.4–10.2)
CEA SERPL-MCNC: 907.29 NG/ML (ref 0–3)
CHLORIDE SERPL-SCNC: 106 MMOL/L (ref 98–107)
CO2 SERPL-SCNC: 26 MMOL/L (ref 22–29)
CREAT SERPL-MCNC: 0.83 MG/DL (ref 0.73–1.18)
CREAT/UREA NIT SERPL: 13
EOSINOPHIL # BLD AUTO: 0.23 X10(3)/MCL (ref 0–0.9)
EOSINOPHIL NFR BLD AUTO: 3.6 %
ERYTHROCYTE [DISTWIDTH] IN BLOOD BY AUTOMATED COUNT: 13.3 % (ref 11.5–17)
GFR SERPLBLD CREATININE-BSD FMLA CKD-EPI: >60 ML/MIN/1.73/M2
GLOBULIN SER-MCNC: 3.7 GM/DL (ref 2.4–3.5)
GLUCOSE SERPL-MCNC: 103 MG/DL (ref 74–100)
HCT VFR BLD AUTO: 41.5 % (ref 42–52)
HGB BLD-MCNC: 14.1 G/DL (ref 14–18)
IMM GRANULOCYTES # BLD AUTO: 0.01 X10(3)/MCL (ref 0–0.04)
IMM GRANULOCYTES NFR BLD AUTO: 0.2 %
LYMPHOCYTES # BLD AUTO: 1.58 X10(3)/MCL (ref 0.6–4.6)
LYMPHOCYTES NFR BLD AUTO: 24.9 %
MCH RBC QN AUTO: 30 PG (ref 27–31)
MCHC RBC AUTO-ENTMCNC: 34 G/DL (ref 33–36)
MCV RBC AUTO: 88.3 FL (ref 80–94)
MONOCYTES # BLD AUTO: 0.5 X10(3)/MCL (ref 0.1–1.3)
MONOCYTES NFR BLD AUTO: 7.9 %
NEUTROPHILS # BLD AUTO: 3.99 X10(3)/MCL (ref 2.1–9.2)
NEUTROPHILS NFR BLD AUTO: 62.9 %
PLATELET # BLD AUTO: 225 X10(3)/MCL (ref 130–400)
PMV BLD AUTO: 10.2 FL (ref 7.4–10.4)
POTASSIUM SERPL-SCNC: 4.5 MMOL/L (ref 3.5–5.1)
PROT SERPL-MCNC: 7.5 GM/DL (ref 6.4–8.3)
PROT UR QL STRIP: ABNORMAL
RBC # BLD AUTO: 4.7 X10(6)/MCL (ref 4.7–6.1)
SODIUM SERPL-SCNC: 142 MMOL/L (ref 136–145)
WBC # BLD AUTO: 6.34 X10(3)/MCL (ref 4.5–11.5)

## 2024-09-19 PROCEDURE — 36415 COLL VENOUS BLD VENIPUNCTURE: CPT

## 2024-09-19 PROCEDURE — 80053 COMPREHEN METABOLIC PANEL: CPT

## 2024-09-19 PROCEDURE — 81005 URINALYSIS: CPT

## 2024-09-19 PROCEDURE — 82378 CARCINOEMBRYONIC ANTIGEN: CPT

## 2024-09-19 PROCEDURE — 85025 COMPLETE CBC W/AUTO DIFF WBC: CPT

## 2024-09-25 ENCOUNTER — OFFICE VISIT (OUTPATIENT)
Dept: PALLIATIVE MEDICINE | Facility: CLINIC | Age: 44
End: 2024-09-25
Payer: COMMERCIAL

## 2024-09-25 VITALS
HEART RATE: 99 BPM | WEIGHT: 201 LBS | BODY MASS INDEX: 29.77 KG/M2 | OXYGEN SATURATION: 97 % | HEIGHT: 69 IN | DIASTOLIC BLOOD PRESSURE: 69 MMHG | TEMPERATURE: 98 F | RESPIRATION RATE: 20 BRPM | SYSTOLIC BLOOD PRESSURE: 116 MMHG

## 2024-09-25 DIAGNOSIS — K59.00 CONSTIPATION, UNSPECIFIED CONSTIPATION TYPE: ICD-10-CM

## 2024-09-25 DIAGNOSIS — G89.3 CANCER-RELATED BREAKTHROUGH PAIN: Primary | ICD-10-CM

## 2024-09-25 DIAGNOSIS — F41.9 ANXIETY: ICD-10-CM

## 2024-09-25 DIAGNOSIS — G47.00 INSOMNIA, UNSPECIFIED TYPE: ICD-10-CM

## 2024-09-25 DIAGNOSIS — Z71.89 COUNSELING REGARDING ADVANCE CARE PLANNING AND GOALS OF CARE: ICD-10-CM

## 2024-09-25 PROBLEM — C77.8 MALIGNANT NEOPLASM METASTATIC TO LYMPH NODES OF MULTIPLE SITES: Status: ACTIVE | Noted: 2024-09-06

## 2024-09-25 PROBLEM — C78.02 MALIGNANT NEOPLASM METASTATIC TO BOTH LUNGS: Status: ACTIVE | Noted: 2024-09-06

## 2024-09-25 PROBLEM — C78.7 SECONDARY MALIGNANT NEOPLASM OF LIVER: Status: ACTIVE | Noted: 2024-09-06

## 2024-09-25 PROBLEM — C78.01 MALIGNANT NEOPLASM METASTATIC TO BOTH LUNGS: Status: ACTIVE | Noted: 2024-09-06

## 2024-09-25 PROCEDURE — 99215 OFFICE O/P EST HI 40 MIN: CPT | Mod: S$GLB,,, | Performed by: INTERNAL MEDICINE

## 2024-09-25 PROCEDURE — 4010F ACE/ARB THERAPY RXD/TAKEN: CPT | Mod: CPTII,S$GLB,, | Performed by: INTERNAL MEDICINE

## 2024-09-25 PROCEDURE — 3008F BODY MASS INDEX DOCD: CPT | Mod: CPTII,S$GLB,, | Performed by: INTERNAL MEDICINE

## 2024-09-25 PROCEDURE — 99999 PR PBB SHADOW E&M-EST. PATIENT-LVL IV: CPT | Mod: PBBFAC,,, | Performed by: INTERNAL MEDICINE

## 2024-09-25 PROCEDURE — 3074F SYST BP LT 130 MM HG: CPT | Mod: CPTII,S$GLB,, | Performed by: INTERNAL MEDICINE

## 2024-09-25 PROCEDURE — 1159F MED LIST DOCD IN RCRD: CPT | Mod: CPTII,S$GLB,, | Performed by: INTERNAL MEDICINE

## 2024-09-25 PROCEDURE — 3078F DIAST BP <80 MM HG: CPT | Mod: CPTII,S$GLB,, | Performed by: INTERNAL MEDICINE

## 2024-09-25 RX ORDER — CYCLOBENZAPRINE HCL 5 MG
5 TABLET ORAL
COMMUNITY
Start: 2024-08-19

## 2024-09-25 NOTE — PROGRESS NOTES
Palliative Clinic    Patient ID: [id number]     Chief Complaint: No chief complaint on file.      HPI:      Owen Swanson is a 44 y.o. male here today for follow up of symptom management and goals of care.  His primary diagnosis is stage IV colorectal cancer with metastases to lung, liver, abdominal lymph nodes. Currently being treated with Fruquintinb, started 8/5/24 as 21 days on/ 7 days off. HTN has been noted with the treatment.  Per oncology note he has been referred to [facility] for clinical trial options pending follow up. Plan to monitor labs every 2 weeks. He was seen by Dr. Bhatti with [facility] on 9/10/24. They are awaiting restaging scans to evaluate for progression of disease. If progression, they discussed 3 options for clinical trial drugs.    9/25/24    The patient denies any significant problems. He has not had repeat imaging. I will defer this to his oncologist. He feels better lately and is eating well, gaining a little weight for the first time. 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 He feels that his wife is a great source of support. He is not Islam but he believes in God. This is not a great source of support for him. He does not participate in counseling, but he gains support from grieving conversations with friends and family.    Pain. He had Tramadol 100 mg previously prescribed, but discussed stopping on last follow-up. He was prescribed flexeril on 8/19 by oncology. He utilizes flexeril and acetaminophen plus gabapentin (if pain unrelieved) for pain. Pain is 5/10 at most and currently 2/10. Pain is in his mid-lower back and abdomen and described as cramping, intermittent, no provoking but better with lying down or resting. He does not request any adjustment in current pain regimen.    Insomnia. Controlled with gabapentin (claims 300 mg or 1 pill) nightly, xanax 0.25 mg, promethazine and flexeril 5 mg at night.     Anxiety. Controlled  with xanax BID  prn.    Constipation. Controlled without prn     He denies nausea, dyspnea, itching, mouth sores, anorexia, weight lose, depression.      Advance Care Planning     Date: 09/25/2024    Living Will  During this visit, I engaged the patient  in the voluntary advance care planning process.  The patient and I reviewed the role for advance directives and their purpose in directing future healthcare if the patient's unable to speak for him/herself.  At this point in time, the patient does have full decision-making capacity.  We discussed different extreme health states that he could experience, and reviewed what kind of medical care he would want in those situations.  The patient communicated that if he were comatose and had little chance of a meaningful recovery, he would not want machines/life-sustaining treatments used. In addition to the above preference, other important end-of-life issues for the patient include  non artificial nutrition by tube .  He has not yet completed the document.        Power of   I initiated the process of voluntary advance care planning today and explained the importance of this process to the patient.  I introduced the concept of advance directives to the patient, as well. Then the patient received detailed information about the importance of designating a Health Care Power of  (HCPOA). He was also instructed to communicate with this person about their wishes for future healthcare, should he become sick and lose decision-making capacity. The patient has not previously appointed a HCPOA. After our discussion, the patient has decided to complete a HCPOA and has appointed his  spouse . I encouraged him to communicate with this person about their wishes for future healthcare, should he become sick and lose decision-making capacity.      A total of 35 min was spent on advance care planning, goals of care discussion, emotional support, formulating and communicating  prognosis and exploring burden/benefit of various approaches of treatment. This discussion occurred on a fully voluntary basis with the verbal consent of the patient and/or family.    [facility]  I engaged the patient in a voluntary conversation about advance care planning and we specifically addressed what the goals of care would be moving forward, in light of the patient's change in clinical status, specifically known metastatic colon cancer.  He understands that his treatment is palliative and not curative. We explored the patient's values and preferences for future care.  The patient endorses that what is most important right now is to focus on remaining as independent as possible, symptom/pain control, quality of life, even if it means sacrificing a little time, improvement in condition but with limits to invasive therapies, comfort and QOL , and He did not clarify his wishes regarding code status at this time.   He intends to review LAPOST further with his wife and complete. We reviewed the document in its entirety.  Accordingly, we have decided that the best plan to meet the patient's goals includes continuing with treatment       Advance Directives:     Decision Making:  Patient answered questions  Goals of Care: What is most important right now is to focus on curative/life-prolongation (regardless of treatment burdens). Accordingly, we have decided that the best plan to meet the patient's goals includes continuing with treatment.        Artificial Nutrition:  Artifical nutrition:  I reviewed the patient's wishes concerning the option for or against a future placement of a PEG for the purpose of long term artificial nutrition. I reviewed the benefits and risks. At this time the patient elects against PEG placement in such an event.    Assessment/Plan:       1. Cancer-related breakthrough pain  Continue flexeril plus acetaminophen prn and additional gabapentin for unrelieved pain.    2. Counseling regarding advance  care planning and goals of care  See above discussion. Will follow up completion of LAPOST and clarify code status.    3. Insomnia, unspecified type  Continue Xanax, flexeril, promethazine and gabapentin at night. He has reduced gabapentin down to 300 mg (1 pill) nightly due to side effects. He denies any residual neuropathy symptoms (fingers cold due to prior chemo side effects).    4. Anxiety  Continue prn xanax.    5. Constipation, unspecified constipation type  Denies current complaints or the need for medications.                 History:     Past Medical History:   Diagnosis Date    Anxiety     Hypertension         No past surgical history on file.     Social History     Tobacco Use    Smoking status: Former     Types: Cigarettes    Smokeless tobacco: Never   Substance and Sexual Activity    Alcohol use: Not Currently    Drug use: Yes     Types: Marijuana    Sexual activity: Not on file        Current Outpatient Medications   Medication Instructions    albuterol (PROVENTIL) 2.5 mg, Inhalation, Every 6 hours PRN    ALPRAZolam (XANAX) 0.25 mg, Oral, 2 times daily PRN    amLODIPine-benazepriL (LOTREL) 10-40 mg per capsule 1 capsule, Oral, Daily    ascorbic acid, vitamin C, (VITAMIN C) 1000 MG tablet 4 tablets, Oral, Nightly    cholecalciferol, vitamin D3, (VITAMIN D3) 25 mcg (1,000 unit) capsule 1 capsule, Oral, Nightly    cyanocobalamin (VITAMIN B-12) 100 MCG tablet 1 tablet, Oral, Nightly    fruquintinib 5 mg Cap Take 1 capsule (5 mg) by mouth once daily for 21 days, then off for 7 days    gabapentin (NEURONTIN) 600 mg, Oral, Nightly    magnesium 200 mg Tab 2 tablets, Oral, Nightly    NON FORMULARY MEDICATION 2 tablets, Oral, Daily, Iron blood builder    NON FORMULARY MEDICATION 1 Piece, Oral, Daily PRN, THC gummies. Patient stated 1 piece or less per day.     NON FORMULARY MEDICATION Quoc Gage oil (THC oil)    omeprazole (PRILOSEC) 20 MG capsule 1 capsule, Oral, Every morning    ondansetron (ZOFRAN) 4 mg,  Oral, Every 8 hours PRN    promethazine (PHENERGAN) 25 mg, Oral, Every 6 hours PRN    zinc acetate 50 mg (zinc) Cap 1 tablet, Oral, Nightly       Review of patient's allergies indicates:  No Known Allergies     Patient Care Team:  Mao Crook DO as PCP - General (Internal Medicine)  Lamar Mc PharmD as Pharmacist (Pharmacist)     Subjective:     Review of Systems   All other systems reviewed and are negative.      12 point review of systems conducted, negative except as stated in the history of present illness. See HPI for details.    Objective:     There were no vitals taken for this visit. There were no vitals filed for this visit.    Physical Exam  Vitals reviewed.   Constitutional:       Appearance: He is not ill-appearing or toxic-appearing.   HENT:      Head: Normocephalic.      Right Ear: External ear normal.      Left Ear: External ear normal.      Nose: Nose normal.      Mouth/Throat:      Mouth: Mucous membranes are moist.      Pharynx: Oropharynx is clear.   Eyes:      Extraocular Movements: Extraocular movements intact.      Conjunctiva/sclera: Conjunctivae normal.      Pupils: Pupils are equal, round, and reactive to light.   Cardiovascular:      Rate and Rhythm: Normal rate and regular rhythm.      Pulses: Normal pulses.      Heart sounds: Normal heart sounds.   Pulmonary:      Effort: Pulmonary effort is normal.      Breath sounds: Normal breath sounds.   Abdominal:      General: Abdomen is flat. Bowel sounds are normal. There is no distension.      Palpations: Abdomen is soft.      Tenderness: There is no abdominal tenderness.   Musculoskeletal:      Right lower leg: No edema.      Left lower leg: No edema.   Skin:     General: Skin is warm.   Neurological:      General: No focal deficit present.      Mental Status: He is alert and oriented to person, place, and time.   Psychiatric:         Mood and Affect: Mood normal.         Behavior: Behavior normal.         Thought Content:  Thought content normal.         Judgment: Judgment normal.         Review of Symptoms      Symptom Assessment (ESAS 0-10 Scale)  Pain:  0  Dyspnea:  0  Anxiety:  0  Nausea:  0  Depression:  0  Anorexia:  0  Fatigue:  0  Insomnia:  0  Restlessness:  0  Agitation:  0     CAM / Delirium:  Negative      Performance Status:  50    Living Arrangements:  Lives with spouse and Lives in home    Psychosocial/Cultural:   See Palliative Psychosocial Note: Yes  Lives with spouse and daughter (21 y/o).  **Primary  to Follow**  Palliative Care  Consult: No    Spiritual:  F - Of[place] and Belief:  Yazidi but doesn't practice  I - Importance:  Not important        Labs Reviewed:     Chemistry:  Lab Results   Component Value Date     09/19/2024    K 4.5 09/19/2024    BUN 10.6 09/19/2024    CREATININE 0.83 09/19/2024    EGFRNORACEVR >60 09/19/2024    GLUCOSE 103 (H) 09/19/2024    CALCIUM 9.7 09/19/2024    ALKPHOS 293 (H) 09/19/2024    LABPROT 7.5 09/19/2024    ALBUMIN 3.8 09/19/2024    AST 51 (H) 09/19/2024    ALT 50 09/19/2024        Hematology:  Lab Results   Component Value Date    WBC 6.34 09/19/2024    HGB 14.1 09/19/2024    HCT 41.5 (L) 09/19/2024     09/19/2024       Urine:  Lab Results   Component Value Date    APPEARANCEUA Clear 05/28/2024    SGUA 1.027 05/28/2024    PROTEINUA 2+ (A) 09/19/2024    KETONESUA Trace (A) 05/28/2024    LEUKOCYTESUR Negative 05/28/2024    RBCUA 0-5 05/28/2024    WBCUA 0-5 05/28/2024    BACTERIA None Seen 05/28/2024    SQEPUA Trace 05/28/2024    PROTEINURINE 32.1 08/30/2024            I have reviewed the narcotic prescription data via .    No follow-ups on file. In addition to their scheduled follow up, the patient has also been instructed to follow up on as needed basis.     Future Appointments   Date Time Provider Department Center   9/25/2024 10:30 AM Porfirio Owen MD [facility] OP [facility]   10/3/2024  9:30 AM LAB, [facility]   10/3/2024  10:00 AM Renee Gipson MD [facility] HEMONC [facility]   12/6/2024  8:00 AM INJECTION CHAIR 01, [facility] CHEMOTHERAPY INFUSION [facility] CHEMO [facility]        Porfirio Owen MD    > 50% of 62 min of encounter was spent in chart review, face to face discussion of goals of care, symptom assessment, coordination of care and emotional support.

## 2024-09-30 ENCOUNTER — TELEPHONE (OUTPATIENT)
Dept: HEMATOLOGY/ONCOLOGY | Facility: CLINIC | Age: 44
End: 2024-09-30
Payer: COMMERCIAL

## 2024-09-30 NOTE — PROGRESS NOTES
Subjective:       Patient ID: Owen Swanson is a 44 y.o. male.    Rectal Cancer Stage IV--Diagnosed 23  Biopsy/pathology:   1. Colonoscopy 23--large fungating rectal mass 5-10cm from anal verge to approximately 20cm, biopsy with well differentiated colorectal adenocarcinoma.   2. Bronchoscopy with biopsy of endobronchial mass RUL biopsy done 3/16/23--metastatic poorly differentiated colonic adenocarcinoma. Foundation One with PD-L1 0%, ALK rearrangement, KRAS G12V, AKT2 amplification, APCV, FBXW7, CCND3 amplification, TP53, VEGFA amplification, TMB 8, MSI-high not detected, elevated tumor fraction.   3. Liquid Biopsy Guardant 360 done 24--KRAS G12V, APC , TP53 R248W, MSI high not detected, NRAS, BRAF, Her2 and NTRK negative.  Guardant 360 liquid biopsy done 24--KRAS G12V, APC and TP53 mutations.   Imagin. PET/CT 24--interval enlargement and increased metabolic activity of primary rectal mass, c/w local disease progression, stable hypermetabolic hepatic metastatic disease, grossly stable infiltrating tumor in RUL bronchus, suspected subtle interval enlargement of subcentimeter nodule in TIGRE and RLL.   2. CT C/A/P w/ contrast done at [facility] 3/18/24--Similar to slight increase in size of left upper and right lower lobe pulmonary nodules. Grossly similar right upper lobe peribronchial lesion and nodularity, similar to slight increase in size of conglomerate maria isabel hepatic and peripancreatic lymphadenopathy, grossly similar metastatic liver lesions.   3. PET/CT 24--Findings concerning for progression of primary rectal mass and metastatic disease.   4. CT A/P done 24--long segment rectal wall thickening and nodularity compatible with known rectal malignancy, multiple calcified abdominopelvic lymph nodes compatible with metastatic nodes. Reference portal caval lymph node measures 2.4 cm short axis, Multiple bilobar hypodense, variably calcified hepatic masses compatible with  colorectal metastases. Largest discrete lesion in the right lobe measures approximately 8.7 cm, findings similar to prior.   5. CT C/A/P done 7/24/24--progressive pulmonary metastatic disease, bulky branching endobronchial tumor anterior RUL, appears enlarged but difficult to measure, 4.5cm (prior 2.5cm), nodule at TIGRE measures 10mm (prior 7mm), nodule RLL measures 16mm (prior 12mm), progressive hepatic metastatic disease, few lesions enlarged two in hepatic segment VI measure 2.6 and 4.4cm (prior 1.9 and 4cm), majority appear stable, large pathologic lymph nodes in maria isabel hepatis have progressed, for example LN anterior to celiac artery measures 2.8cm vs. Prior 2.3cm, new 2cm splenic lesion, potentially metastatic, large rectal mass 8cm, similar, invading the mesorectal fat, grossly stable partially calcified metastatic lymph nodes in mesorectal fat and retroperitoneum.     Treatment history:  FOLFOX/Avastin 2/22/23 followed by maintenance.  Progression in 11/2023.   FOLFIRI/Avastin 11/2023--4/29/24 (stopped due to progression).  Alectinib 5/22/24--7/26/24 (stopped due to progression).    CEA:   05/09/24--904  06/12/24--1,131.53  07/01/24--1,191.49  07/16/24--1,496.72  08/19/24--1,073.40  09/05/24--1,349.70  09/19/24--907.29    Current treatment plan:   Fruquintinib 5mg daily X 21 days on/7 days off started on 8/5/24.   Cycle 3 started on 9/30/24.     Chief Complaint: Abdominal Pain and Back Pain    HPI  Patient presents for follow-up of Stage IV rectal cancer. He is doing better. Reports having some ongoing back pain. Dr. Owen palliative care is managing his symptoms and pain medication. He reports that he is tolerating the Fruquintinib much better than his prior treatment. Overall feeling well. BP is mildly elevated. He has not been checking at home. He was seen finally at [facility]. And they do have trials available for him upon POD.     Past Medical History:   Diagnosis Date    Anxiety     Hypertension        History reviewed. No pertinent surgical history.  Family History   Problem Relation Name Age of Onset    Hypertension Mother      Coronary artery disease Father      Hypertension Father      Diabetes Father       Social History     Socioeconomic History    Marital status:    Tobacco Use    Smoking status: Former     Types: Cigarettes    Smokeless tobacco: Never   Substance and Sexual Activity    Alcohol use: Not Currently    Drug use: Yes     Types: Marijuana       Review of patient's allergies indicates:  No Known Allergies   Current Outpatient Medications on File Prior to Visit   Medication Sig Dispense Refill    albuterol (PROVENTIL) 2.5 mg /3 mL (0.083 %) nebulizer solution Inhale 2.5 mg into the lungs every 6 (six) hours as needed.      ALPRAZolam (XANAX) 0.25 MG tablet Take 1 tablet (0.25 mg total) by mouth 2 (two) times daily as needed for Anxiety. 60 tablet 1    amLODIPine-benazepriL (LOTREL) 10-40 mg per capsule Take 1 capsule by mouth once daily. 30 capsule 3    ascorbic acid, vitamin C, (VITAMIN C) 1000 MG tablet Take 4 tablets by mouth every evening.      cholecalciferol, vitamin D3, (VITAMIN D3) 25 mcg (1,000 unit) capsule Take 1 capsule by mouth every evening.      cyanocobalamin (VITAMIN B-12) 100 MCG tablet Take 1 tablet by mouth every evening.      cyclobenzaprine (FLEXERIL) 5 MG tablet Take 5 mg by mouth.      fruquintinib 5 mg Cap Take 1 capsule (5 mg) by mouth once daily for 21 days, then off for 7 days 21 capsule 6    gabapentin (NEURONTIN) 300 MG capsule Take 300 mg by mouth every evening.      magnesium 200 mg Tab Take 2 tablets by mouth every evening.      NON FORMULARY MEDICATION Take 2 tablets by mouth Daily. Iron blood builder      NON FORMULARY MEDICATION Take 1 Piece by mouth daily as needed (pain). THC gummies. Patient stated 1 piece or less per day.      NON FORMULARY MEDICATION Quoc Gage oil (THC oil)      omeprazole (PRILOSEC) 20 MG capsule Take 1 capsule by mouth every  "morning.      ondansetron (ZOFRAN) 4 MG tablet Take 4 mg by mouth every 8 (eight) hours as needed.      promethazine (PHENERGAN) 25 MG tablet Take 1 tablet (25 mg total) by mouth every 6 (six) hours as needed for Nausea. 34 tablet 0    zinc acetate 50 mg (zinc) Cap Take 1 tablet by mouth every evening.       No current facility-administered medications on file prior to visit.      Review of Systems   Constitutional:  Positive for appetite change and fatigue. Unexpected weight change: weight fluctuates.  HENT:  Negative for mouth sores.    Eyes:  Negative for visual disturbance.   Respiratory:  Negative for shortness of breath.    Cardiovascular:  Negative for chest pain.   Gastrointestinal:  Positive for abdominal pain and constipation. Negative for diarrhea.   Musculoskeletal:  Positive for back pain.   Neurological:  Negative for headaches.   Hematological:  Negative for adenopathy.   Psychiatric/Behavioral:  The patient is nervous/anxious.          Vitals:    10/01/24 1452   BP: (!) 137/97   Pulse: 73   Resp: 14   Temp: 99 °F (37.2 °C)   TempSrc: Oral   SpO2: 98%   Weight: 90.7 kg (199 lb 14.4 oz)   Height: 5' 9" (1.753 m)       Physical Exam  Constitutional:       General: He is awake.      Appearance: Normal appearance. He is normal weight.   HENT:      Head: Normocephalic and atraumatic.      Nose: Nose normal.      Mouth/Throat:      Mouth: Mucous membranes are moist.   Eyes:      General: Vision grossly intact.      Extraocular Movements: Extraocular movements intact.      Conjunctiva/sclera: Conjunctivae normal.   Cardiovascular:      Rate and Rhythm: Normal rate and regular rhythm.      Heart sounds: Normal heart sounds.   Pulmonary:      Effort: Pulmonary effort is normal.      Breath sounds: Normal breath sounds.      Comments: No wheezing today.  Chest:      Comments: Left chest wall mediport in place  Abdominal:      General: Abdomen is protuberant. Bowel sounds are normal. There is no distension.      " Palpations: Abdomen is soft.      Tenderness: There is no abdominal tenderness.   Musculoskeletal:      Cervical back: Normal range of motion and neck supple.      Right lower leg: No edema.      Left lower leg: No edema.   Lymphadenopathy:      Cervical: No cervical adenopathy.      Upper Body:      Right upper body: No supraclavicular adenopathy.      Left upper body: No supraclavicular adenopathy.   Skin:     General: Skin is warm.   Neurological:      Mental Status: He is alert and oriented to person, place, and time.      Motor: Motor function is intact.   Psychiatric:         Mood and Affect: Mood normal.         Speech: Speech normal.         Behavior: Behavior is cooperative.         Judgment: Judgment normal.       Lab Visit on 10/01/2024   Component Date Value Ref Range Status    WBC 10/01/2024 7.93  4.50 - 11.50 x10(3)/mcL Final    RBC 10/01/2024 4.70  4.70 - 6.10 x10(6)/mcL Final    Hgb 10/01/2024 13.8 (L)  14.0 - 18.0 g/dL Final    Hct 10/01/2024 41.8 (L)  42.0 - 52.0 % Final    MCV 10/01/2024 88.9  80.0 - 94.0 fL Final    MCH 10/01/2024 29.4  27.0 - 31.0 pg Final    MCHC 10/01/2024 33.0  33.0 - 36.0 g/dL Final    RDW 10/01/2024 13.4  11.5 - 17.0 % Final    Platelet 10/01/2024 197  130 - 400 x10(3)/mcL Final    MPV 10/01/2024 10.5 (H)  7.4 - 10.4 fL Final    Neut % 10/01/2024 75.8  % Final    Lymph % 10/01/2024 14.4  % Final    Mono % 10/01/2024 6.8  % Final    Eos % 10/01/2024 2.4  % Final    Basophil % 10/01/2024 0.3  % Final    Lymph # 10/01/2024 1.14  0.6 - 4.6 x10(3)/mcL Final    Neut # 10/01/2024 6.02  2.1 - 9.2 x10(3)/mcL Final    Mono # 10/01/2024 0.54  0.1 - 1.3 x10(3)/mcL Final    Eos # 10/01/2024 0.19  0 - 0.9 x10(3)/mcL Final    Baso # 10/01/2024 0.02  <=0.2 x10(3)/mcL Final    IG# 10/01/2024 0.02  0 - 0.04 x10(3)/mcL Final    IG% 10/01/2024 0.3  % Final         Assessment:       1. Colorectal cancer, stage IV    2. Colon cancer metastasized to lung    3. Malignant neoplasm metastatic to  lymph nodes of multiple sites    4. Secondary malignant neoplasm of liver        Plan:       Patient with Stage IV rectal cancer, lung, liver, abdominal mariya metastases diagnosed in 2/2023. KRAS mutated.  Patient has failed FOLFOX avastin and FOLFIRI avastin.  Most Recent PET from 4/29/24 shows disease progression and CEA rising.    In review of previous Foundation One testing, noted to have an ALK rearrangement.   I did a literature review and there have been documented responses in colon cancers treatment with ALK inhibitors.   Case presented at [facility] clinical trials tumor board. No current clinical trials available, but treatment recommended with Alectinib.   Case presented at molecular tumor board on 6/4/24, agreed with trial of Alectinib.    Discussed conventional treatment with 3rd line Stivarga vs. Lonsurf which have not historically had good outcomes. Also discussed incurable nature of disease and continued palliative goals of treatment.  Would favor a more aggressive approach to treatment given his excellent functional status and young age. She did not think that Keytruda or immunotherapy would be effective given JESUS.     Patient started Alectinib 600mg PO BID on 5/22/24.  CT done 7/24/24 shows some stable disease, but mostly progression.  Recommend to change treatment to Fruquintinib per [facility].   Received 2 units PRBC on 7/29/24 for worsening anemia.       Patient started Fruquintinib 5mg daily X 21 days on/7 days off on 8/5/24.   C3 started on 9/30/24.  Patient is tolerating well. BP was elevated, so his BP medication was increased.   His BP is elevated today, but only slightly. Will monitor and I instructed him to check at home.    Baseline urine protein done 7/26/24. Repeat a few weeks later showed 2+ urine. 24-hour urine okay with 449gm of protein. Recommended to continue with same treatment. Last urine protein from 9/19/24 same at 2+.    Labs today show mild anemia, CBC otherwise good, will f/u CMP.  His alk phos has been coming down and his last CEA was also down.       Repeat CT C/A/P at [facility] for restaging.   F/u in 3 weeks with repeat labs and visit.    Patient will continue follow-up with palliative care for pain management, insomnia and anxiety management.       Continue supplements with Miles Perret-  Boost Breeze or Ensure Clear supplements.   Patient has no FH of any cancer, may need genetic testing due to the APC mutation.    Seen at [facility] for clinical trial options and if progressive disease on next scans, they identified the following trial options for him:  1. 2022-0276, [facility], [place]: [place]  2. 2020-1340 [id number] (PKMYT1 kinase inhibitor), [place]: Edgar Patricia  3. 2024-0004: APR-1051 (Wee1 inhibitor). [place]: Lalitha Christianson  4. [place] trial with Dr. Viki Frank     All questions answered at this time.      Renee Gipson MD

## 2024-10-01 ENCOUNTER — OFFICE VISIT (OUTPATIENT)
Dept: HEMATOLOGY/ONCOLOGY | Facility: CLINIC | Age: 44
End: 2024-10-01
Payer: COMMERCIAL

## 2024-10-01 ENCOUNTER — LAB VISIT (OUTPATIENT)
Dept: LAB | Facility: HOSPITAL | Age: 44
End: 2024-10-01
Attending: INTERNAL MEDICINE
Payer: COMMERCIAL

## 2024-10-01 VITALS
HEIGHT: 69 IN | HEART RATE: 73 BPM | OXYGEN SATURATION: 98 % | DIASTOLIC BLOOD PRESSURE: 97 MMHG | SYSTOLIC BLOOD PRESSURE: 137 MMHG | TEMPERATURE: 99 F | WEIGHT: 199.88 LBS | RESPIRATION RATE: 14 BRPM | BODY MASS INDEX: 29.6 KG/M2

## 2024-10-01 DIAGNOSIS — C78.7 SECONDARY MALIGNANT NEOPLASM OF LIVER: ICD-10-CM

## 2024-10-01 DIAGNOSIS — C19 COLORECTAL CANCER, STAGE IV: ICD-10-CM

## 2024-10-01 DIAGNOSIS — C18.9 COLON CANCER METASTASIZED TO LUNG: ICD-10-CM

## 2024-10-01 DIAGNOSIS — C78.00 COLON CANCER METASTASIZED TO LUNG: ICD-10-CM

## 2024-10-01 DIAGNOSIS — Z79.899 IMMUNODEFICIENCY DUE TO DRUGS: ICD-10-CM

## 2024-10-01 DIAGNOSIS — D84.821 IMMUNODEFICIENCY DUE TO DRUGS: ICD-10-CM

## 2024-10-01 DIAGNOSIS — G89.3 CANCER RELATED PAIN: ICD-10-CM

## 2024-10-01 DIAGNOSIS — C77.8 MALIGNANT NEOPLASM METASTATIC TO LYMPH NODES OF MULTIPLE SITES: ICD-10-CM

## 2024-10-01 DIAGNOSIS — C19 COLORECTAL CANCER, STAGE IV: Primary | ICD-10-CM

## 2024-10-01 LAB
ALBUMIN SERPL-MCNC: 3.6 G/DL (ref 3.5–5)
ALBUMIN/GLOB SERPL: 1 RATIO (ref 1.1–2)
ALP SERPL-CCNC: 300 UNIT/L (ref 40–150)
ALT SERPL-CCNC: 50 UNIT/L (ref 0–55)
ANION GAP SERPL CALC-SCNC: 5 MEQ/L
AST SERPL-CCNC: 44 UNIT/L (ref 5–34)
BASOPHILS # BLD AUTO: 0.02 X10(3)/MCL
BASOPHILS NFR BLD AUTO: 0.3 %
BILIRUB SERPL-MCNC: 0.4 MG/DL
BUN SERPL-MCNC: 13.7 MG/DL (ref 8.9–20.6)
CALCIUM SERPL-MCNC: 9.3 MG/DL (ref 8.4–10.2)
CEA SERPL-MCNC: 1424.78 NG/ML (ref 0–3)
CHLORIDE SERPL-SCNC: 107 MMOL/L (ref 98–107)
CO2 SERPL-SCNC: 27 MMOL/L (ref 22–29)
CREAT SERPL-MCNC: 0.78 MG/DL (ref 0.73–1.18)
CREAT/UREA NIT SERPL: 18
EOSINOPHIL # BLD AUTO: 0.19 X10(3)/MCL (ref 0–0.9)
EOSINOPHIL NFR BLD AUTO: 2.4 %
ERYTHROCYTE [DISTWIDTH] IN BLOOD BY AUTOMATED COUNT: 13.4 % (ref 11.5–17)
GFR SERPLBLD CREATININE-BSD FMLA CKD-EPI: >60 ML/MIN/1.73/M2
GLOBULIN SER-MCNC: 3.5 GM/DL (ref 2.4–3.5)
GLUCOSE SERPL-MCNC: 166 MG/DL (ref 74–100)
HCT VFR BLD AUTO: 41.8 % (ref 42–52)
HGB BLD-MCNC: 13.8 G/DL (ref 14–18)
IMM GRANULOCYTES # BLD AUTO: 0.02 X10(3)/MCL (ref 0–0.04)
IMM GRANULOCYTES NFR BLD AUTO: 0.3 %
LYMPHOCYTES # BLD AUTO: 1.14 X10(3)/MCL (ref 0.6–4.6)
LYMPHOCYTES NFR BLD AUTO: 14.4 %
MCH RBC QN AUTO: 29.4 PG (ref 27–31)
MCHC RBC AUTO-ENTMCNC: 33 G/DL (ref 33–36)
MCV RBC AUTO: 88.9 FL (ref 80–94)
MONOCYTES # BLD AUTO: 0.54 X10(3)/MCL (ref 0.1–1.3)
MONOCYTES NFR BLD AUTO: 6.8 %
NEUTROPHILS # BLD AUTO: 6.02 X10(3)/MCL (ref 2.1–9.2)
NEUTROPHILS NFR BLD AUTO: 75.8 %
PLATELET # BLD AUTO: 197 X10(3)/MCL (ref 130–400)
PMV BLD AUTO: 10.5 FL (ref 7.4–10.4)
POTASSIUM SERPL-SCNC: 4.6 MMOL/L (ref 3.5–5.1)
PROT SERPL-MCNC: 7.1 GM/DL (ref 6.4–8.3)
RBC # BLD AUTO: 4.7 X10(6)/MCL (ref 4.7–6.1)
SODIUM SERPL-SCNC: 139 MMOL/L (ref 136–145)
WBC # BLD AUTO: 7.93 X10(3)/MCL (ref 4.5–11.5)

## 2024-10-01 PROCEDURE — 3080F DIAST BP >= 90 MM HG: CPT | Mod: CPTII,S$GLB,, | Performed by: INTERNAL MEDICINE

## 2024-10-01 PROCEDURE — 85025 COMPLETE CBC W/AUTO DIFF WBC: CPT

## 2024-10-01 PROCEDURE — 36415 COLL VENOUS BLD VENIPUNCTURE: CPT

## 2024-10-01 PROCEDURE — 1160F RVW MEDS BY RX/DR IN RCRD: CPT | Mod: CPTII,S$GLB,, | Performed by: INTERNAL MEDICINE

## 2024-10-01 PROCEDURE — 99999 PR PBB SHADOW E&M-EST. PATIENT-LVL V: CPT | Mod: PBBFAC,,, | Performed by: INTERNAL MEDICINE

## 2024-10-01 PROCEDURE — 3008F BODY MASS INDEX DOCD: CPT | Mod: CPTII,S$GLB,, | Performed by: INTERNAL MEDICINE

## 2024-10-01 PROCEDURE — 82378 CARCINOEMBRYONIC ANTIGEN: CPT

## 2024-10-01 PROCEDURE — 80053 COMPREHEN METABOLIC PANEL: CPT

## 2024-10-01 PROCEDURE — 4010F ACE/ARB THERAPY RXD/TAKEN: CPT | Mod: CPTII,S$GLB,, | Performed by: INTERNAL MEDICINE

## 2024-10-01 PROCEDURE — 99215 OFFICE O/P EST HI 40 MIN: CPT | Mod: S$GLB,,, | Performed by: INTERNAL MEDICINE

## 2024-10-01 PROCEDURE — 1159F MED LIST DOCD IN RCRD: CPT | Mod: CPTII,S$GLB,, | Performed by: INTERNAL MEDICINE

## 2024-10-01 PROCEDURE — 3075F SYST BP GE 130 - 139MM HG: CPT | Mod: CPTII,S$GLB,, | Performed by: INTERNAL MEDICINE

## 2024-10-04 ENCOUNTER — PATIENT MESSAGE (OUTPATIENT)
Dept: HEMATOLOGY/ONCOLOGY | Facility: CLINIC | Age: 44
End: 2024-10-04
Payer: COMMERCIAL

## 2024-10-09 ENCOUNTER — HOSPITAL ENCOUNTER (OUTPATIENT)
Dept: RADIOLOGY | Facility: HOSPITAL | Age: 44
Discharge: HOME | End: 2024-10-09
Attending: INTERNAL MEDICINE
Payer: COMMERCIAL

## 2024-10-09 DIAGNOSIS — C19 COLORECTAL CANCER, STAGE IV: ICD-10-CM

## 2024-10-09 DIAGNOSIS — C18.9 COLON CANCER METASTASIZED TO LUNG: ICD-10-CM

## 2024-10-09 DIAGNOSIS — C78.7 SECONDARY MALIGNANT NEOPLASM OF LIVER: ICD-10-CM

## 2024-10-09 DIAGNOSIS — C78.00 COLON CANCER METASTASIZED TO LUNG: ICD-10-CM

## 2024-10-09 PROCEDURE — 74178 CT ABD&PLV WO CNTR FLWD CNTR: CPT | Mod: TC

## 2024-10-09 PROCEDURE — 71260 CT THORAX DX C+: CPT | Mod: TC

## 2024-10-09 PROCEDURE — 25500020 PHARM REV CODE 255

## 2024-10-09 RX ORDER — DIATRIZOATE MEGLUMINE AND DIATRIZOATE SODIUM 660; 100 MG/ML; MG/ML
SOLUTION ORAL; RECTAL
Status: COMPLETED
Start: 2024-10-09 | End: 2024-10-09

## 2024-10-09 RX ADMIN — IOHEXOL 100 ML: 350 INJECTION, SOLUTION INTRAVENOUS at 03:10

## 2024-10-09 RX ADMIN — DIATRIZOATE MEGLUMINE AND DIATRIZOATE SODIUM 30 ML: 660; 100 LIQUID ORAL; RECTAL at 04:10

## 2024-10-11 NOTE — PROGRESS NOTES
Subjective:       Patient ID: Owen Swanson is a 44 y.o. male.    Rectal Cancer Stage IV--Diagnosed 23  Biopsy/pathology:   1. Colonoscopy 23--large fungating rectal mass 5-10cm from anal verge to approximately 20cm, biopsy with well differentiated colorectal adenocarcinoma.   2. Bronchoscopy with biopsy of endobronchial mass RUL biopsy done 3/16/23--metastatic poorly differentiated colonic adenocarcinoma. Foundation One with PD-L1 0%, ALK rearrangement, KRAS G12V, AKT2 amplification, APCV, FBXW7, CCND3 amplification, TP53, VEGFA amplification, TMB 8, MSI-high not detected, elevated tumor fraction.   3. Liquid Biopsy Guardant 360 done 24--KRAS G12V, APC , TP53 R248W, MSI high not detected, NRAS, BRAF, Her2 and NTRK negative.  Guardant 360 liquid biopsy done 24--KRAS G12V, APC and TP53 mutations.   Imagin. PET/CT 24--interval enlargement and increased metabolic activity of primary rectal mass, c/w local disease progression, stable hypermetabolic hepatic metastatic disease, grossly stable infiltrating tumor in RUL bronchus, suspected subtle interval enlargement of subcentimeter nodule in TIGRE and RLL.   2. CT C/A/P w/ contrast done at [facility] 3/18/24--Similar to slight increase in size of left upper and right lower lobe pulmonary nodules. Grossly similar right upper lobe peribronchial lesion and nodularity, similar to slight increase in size of conglomerate maria isabel hepatic and peripancreatic lymphadenopathy, grossly similar metastatic liver lesions.   3. PET/CT 24--Findings concerning for progression of primary rectal mass and metastatic disease.   4. CT A/P done 24--long segment rectal wall thickening and nodularity compatible with known rectal malignancy, multiple calcified abdominopelvic lymph nodes compatible with metastatic nodes. Reference portal caval lymph node measures 2.4 cm short axis, Multiple bilobar hypodense, variably calcified hepatic masses compatible with  colorectal metastases. Largest discrete lesion in the right lobe measures approximately 8.7 cm, findings similar to prior.   5. CT C/A/P done 7/24/24--progressive pulmonary metastatic disease, bulky branching endobronchial tumor anterior RUL, appears enlarged but difficult to measure, 4.5cm (prior 2.5cm), nodule at TIGRE measures 10mm (prior 7mm), nodule RLL measures 16mm (prior 12mm), progressive hepatic metastatic disease, few lesions enlarged two in hepatic segment VI measure 2.6 and 4.4cm (prior 1.9 and 4cm), majority appear stable, large pathologic lymph nodes in maria isabel hepatis have progressed, for example LN anterior to celiac artery measures 2.8cm vs. Prior 2.3cm, new 2cm splenic lesion, potentially metastatic, large rectal mass 8cm, similar, invading the mesorectal fat, grossly stable partially calcified metastatic lymph nodes in mesorectal fat and retroperitoneum.   6. CT C/A/P done 10/9/24--Areas of endobronchial soft tissue in the right upper lobe have similar overall size, solid nodule in the right lower lobe measures 17 mm, previously 16 mm. The left upper lobe nodule measures 13 mm, previously 10 mm. No definitive new suspicious nodule. Stable 11 mm right lower paratracheal lymph node. Liver lesions and periportal lymph nodes have similar sizes since July, although with mild decrease in overall attenuation.  Suspect this relates to post treatment effect. Slightly improved rectal wall thickening.  The splenic hypodensity is smaller.    Treatment history:  FOLFOX/Avastin 2/22/23 followed by maintenance.  Progression in 11/2023.   FOLFIRI/Avastin 11/2023--4/29/24 (stopped due to progression).  Alectinib 5/22/24--7/26/24 (stopped due to progression).    CEA:   05/09/24--904  06/12/24--1,131.53  07/01/24--1,191.49  07/16/24--1,496.72  08/19/24--1,073.40  09/05/24--1,349.70  09/19/24--907.29  10/01/24--1,424.78    Current treatment plan:   Fruquintinib 5mg daily X 21 days on/7 days off started on 8/5/24.    Cycle 4 to start on 10/28/24.     Chief Complaint: Follow-up    HPI  Patient presents for follow-up of Stage IV rectal cancer. He is actually doing well. Reports increased energy. Pain is okay. He has chronic back pain, not related to his cancer, takes Tylenol and Gabapentin PRN. Urine protein is 3+ and we discussed obtaining another 24 hour urine. BP running high and discussed adding BP medication. Recent CT shows mixed findings and I recommended to continue with current treatment.     Past Medical History:   Diagnosis Date    Anxiety     Hypertension       History reviewed. No pertinent surgical history.  Family History   Problem Relation Name Age of Onset    Hypertension Mother      Coronary artery disease Father Bora Swanson Jr.     Hypertension Father Bora Swanson Jr.     Diabetes Father Bora Swanson Jr.     Cancer Father Bora Swanson Jr.      Social History     Socioeconomic History    Marital status:    Tobacco Use    Smoking status: Former     Current packs/day: 0.00     Average packs/day: 0.5 packs/day for 10.0 years (5.0 ttl pk-yrs)     Types: Cigarettes     Quit date: 2016     Years since quittin.8    Smokeless tobacco: Never   Substance and Sexual Activity    Alcohol use: Never    Drug use: Yes     Types: Marijuana    Sexual activity: Yes     Partners: Female     Birth control/protection: None       Review of patient's allergies indicates:  No Known Allergies   Current Outpatient Medications on File Prior to Visit   Medication Sig Dispense Refill    albuterol (PROVENTIL) 2.5 mg /3 mL (0.083 %) nebulizer solution Inhale 2.5 mg into the lungs every 6 (six) hours as needed.      ALPRAZolam (XANAX) 0.25 MG tablet Take 1 tablet (0.25 mg total) by mouth 2 (two) times daily as needed for Anxiety. 60 tablet 1    amLODIPine-benazepriL (LOTREL) 10-40 mg per capsule Take 1 capsule by mouth once daily. 30 capsule 3    ascorbic acid, vitamin C, (VITAMIN C) 1000 MG tablet Take 4 tablets by  mouth every evening.      cholecalciferol, vitamin D3, (VITAMIN D3) 25 mcg (1,000 unit) capsule Take 1 capsule by mouth every evening.      cyanocobalamin (VITAMIN B-12) 100 MCG tablet Take 1 tablet by mouth every evening.      cyclobenzaprine (FLEXERIL) 5 MG tablet Take 5 mg by mouth.      fruquintinib 5 mg Cap Take 1 capsule (5 mg) by mouth once daily for 21 days, then off for 7 days 21 capsule 6    gabapentin (NEURONTIN) 300 MG capsule Take 300 mg by mouth every evening.      magnesium 200 mg Tab Take 2 tablets by mouth every evening.      NON FORMULARY MEDICATION Take 2 tablets by mouth Daily. Iron blood builder      NON FORMULARY MEDICATION Take 1 Piece by mouth daily as needed (pain). THC gummies. Patient stated 1 piece or less per day.      NON FORMULARY MEDICATION Quoc Gage oil (THC oil)      omeprazole (PRILOSEC) 20 MG capsule Take 1 capsule by mouth every morning.      ondansetron (ZOFRAN) 4 MG tablet Take 4 mg by mouth every 8 (eight) hours as needed.      promethazine (PHENERGAN) 25 MG tablet Take 1 tablet (25 mg total) by mouth every 6 (six) hours as needed for Nausea. 34 tablet 0    zinc acetate 50 mg (zinc) Cap Take 1 tablet by mouth every evening.       No current facility-administered medications on file prior to visit.      Review of Systems   Constitutional:  Positive for appetite change and fatigue. Unexpected weight change: weight fluctuates.  HENT:  Negative for mouth sores.    Eyes:  Negative for visual disturbance.   Respiratory:  Negative for shortness of breath.    Cardiovascular:  Negative for chest pain.   Gastrointestinal:  Positive for abdominal pain and constipation. Negative for diarrhea.   Musculoskeletal:  Positive for back pain.   Neurological:  Negative for headaches.   Hematological:  Negative for adenopathy.   Psychiatric/Behavioral:  The patient is nervous/anxious.          Vitals:    10/17/24 1052   BP: (!) 144/100   Pulse: 72   Resp: 18   Temp: 98.6 °F (37 °C)   TempSrc:  "Oral   SpO2: 99%   Weight: 91.6 kg (201 lb 14.4 oz)   Height: 5' 9" (1.753 m)     Physical Exam  Constitutional:       General: He is awake.      Appearance: Normal appearance. He is normal weight.   HENT:      Head: Normocephalic and atraumatic.      Nose: Nose normal.      Mouth/Throat:      Mouth: Mucous membranes are moist.   Eyes:      General: Vision grossly intact.      Extraocular Movements: Extraocular movements intact.      Conjunctiva/sclera: Conjunctivae normal.   Cardiovascular:      Rate and Rhythm: Normal rate and regular rhythm.      Heart sounds: Normal heart sounds.   Pulmonary:      Effort: Pulmonary effort is normal.      Breath sounds: Normal breath sounds.   Chest:      Comments: Left chest wall mediport in place  Abdominal:      General: Abdomen is protuberant. Bowel sounds are normal. There is no distension.      Palpations: Abdomen is soft.      Tenderness: There is no abdominal tenderness.   Musculoskeletal:      Cervical back: Normal range of motion and neck supple.      Right lower leg: No edema.      Left lower leg: No edema.   Lymphadenopathy:      Cervical: No cervical adenopathy.      Upper Body:      Right upper body: No supraclavicular adenopathy.      Left upper body: No supraclavicular adenopathy.   Skin:     General: Skin is warm.   Neurological:      Mental Status: He is alert and oriented to person, place, and time.      Motor: Motor function is intact.   Psychiatric:         Mood and Affect: Mood normal.         Speech: Speech normal.         Behavior: Behavior is cooperative.         Judgment: Judgment normal.       Lab Visit on 10/17/2024   Component Date Value Ref Range Status    Protein, UA 10/17/2024 3+ (A)  Negative Final    WBC 10/17/2024 5.99  4.50 - 11.50 x10(3)/mcL Final    RBC 10/17/2024 5.05  4.70 - 6.10 x10(6)/mcL Final    Hgb 10/17/2024 14.8  14.0 - 18.0 g/dL Final    Hct 10/17/2024 44.1  42.0 - 52.0 % Final    MCV 10/17/2024 87.3  80.0 - 94.0 fL Final    MCH " 10/17/2024 29.3  27.0 - 31.0 pg Final    MCHC 10/17/2024 33.6  33.0 - 36.0 g/dL Final    RDW 10/17/2024 13.6  11.5 - 17.0 % Final    Platelet 10/17/2024 241  130 - 400 x10(3)/mcL Final    MPV 10/17/2024 10.5 (H)  7.4 - 10.4 fL Final    Neut % 10/17/2024 72.3  % Final    Lymph % 10/17/2024 18.0  % Final    Mono % 10/17/2024 7.3  % Final    Eos % 10/17/2024 2.0  % Final    Basophil % 10/17/2024 0.2  % Final    Lymph # 10/17/2024 1.08  0.6 - 4.6 x10(3)/mcL Final    Neut # 10/17/2024 4.33  2.1 - 9.2 x10(3)/mcL Final    Mono # 10/17/2024 0.44  0.1 - 1.3 x10(3)/mcL Final    Eos # 10/17/2024 0.12  0 - 0.9 x10(3)/mcL Final    Baso # 10/17/2024 0.01  <=0.2 x10(3)/mcL Final    IG# 10/17/2024 0.01  0 - 0.04 x10(3)/mcL Final    IG% 10/17/2024 0.2  % Final         Assessment:       1. Malignant neoplasm metastatic to lymph nodes of multiple sites    2. Colorectal cancer, stage IV    3. Colon cancer metastasized to lung    4. Other secondary hypertension      Plan:       Patient with Stage IV rectal cancer, lung, liver, abdominal mariya metastases diagnosed in 2/2023. KRAS mutated.  Patient has failed FOLFOX avastin and FOLFIRI avastin.  Most Recent PET from 4/29/24 shows disease progression and CEA rising.    In review of previous Foundation One testing, noted to have an ALK rearrangement.   I did a literature review and there have been documented responses in colon cancers treatment with ALK inhibitors.   Case presented at [facility] clinical trials tumor board. No current clinical trials available, but treatment recommended with Alectinib.   Case presented at molecular tumor board on 6/4/24, agreed with trial of Alectinib.    Discussed conventional treatment with 3rd line Stivarga vs. Lonsurf which have not historically had good outcomes. Also discussed incurable nature of disease and continued palliative goals of treatment.  Would favor a more aggressive approach to treatment given his excellent functional status and young age.  She did not think that Keytruda or immunotherapy would be effective given JESUS.     Patient started Alectinib 600mg PO BID on 5/22/24.  CT done 7/24/24 shows some stable disease, but mostly progression.  Recommend to change treatment to Fruquintinib per [facility].   Received 2 units PRBC on 7/29/24 for worsening anemia.    Patient started Fruquintinib 5mg daily X 21 days on/7 days off on 8/5/24.   C3 started on 9/30/24.    CT C/A/P done 10/9/24 shows mixed findings, improvement in liver and a few lung nodules slightly increased.   Recommend to continue with same treatment. He is tolerating well and feeling good.     C4 due to start on 10/28/24.    Patient is tolerating well. BP continues to be elevated. He is currently on Lotrel 10/40 daily, will add Metoprolol 25mg PO BID.     Labs today with resolved anemia, CMP pending, urine protein 3+.    Baseline urine protein done 7/26/24. Repeat a few weeks later showed 2+ urine. 24-hour urine okay with 449gm of protein. Recommended to continue with same treatment. Last urine protein from 9/19/24 same at 2+.    Will obtain another 24-hour urine protein to be sure is <2g/24 hours.    Patient will continue follow-up with palliative care for pain management, insomnia and anxiety management.     F/u in 4 weeks with repeat labs and visit.  Plan to repeat CT at [facility] in early 12/2024.     Continue supplements with Miles Perret-  Boost Breeze or Ensure Clear supplements.   Patient has no FH of any cancer, may need genetic testing due to the APC mutation.    Seen at [facility] for clinical trial options and if progressive disease on next scans, they identified the following trial options for him:  1. 2022-0276, [facility] 6236, [place]: Chantal  2. 2020-1340 RP-[id number] (PKMYT1 kinase inhibitor), [place]: Edgar Patricia  3. 2024-0004: APR-[id number] (Wee1 inhibitor). [place]: Lalitha Christianson  4. [facility] trial with Dr. Viki Frank     All questions answered at this time.      Renee Gipson MD

## 2024-10-17 ENCOUNTER — LAB VISIT (OUTPATIENT)
Dept: LAB | Facility: HOSPITAL | Age: 44
End: 2024-10-17
Attending: INTERNAL MEDICINE
Payer: COMMERCIAL

## 2024-10-17 ENCOUNTER — OFFICE VISIT (OUTPATIENT)
Dept: HEMATOLOGY/ONCOLOGY | Facility: CLINIC | Age: 44
End: 2024-10-17
Payer: COMMERCIAL

## 2024-10-17 VITALS
BODY MASS INDEX: 29.9 KG/M2 | DIASTOLIC BLOOD PRESSURE: 100 MMHG | TEMPERATURE: 99 F | HEIGHT: 69 IN | WEIGHT: 201.88 LBS | HEART RATE: 72 BPM | SYSTOLIC BLOOD PRESSURE: 144 MMHG | RESPIRATION RATE: 18 BRPM | OXYGEN SATURATION: 99 %

## 2024-10-17 DIAGNOSIS — C18.9 COLON CANCER METASTASIZED TO LUNG: ICD-10-CM

## 2024-10-17 DIAGNOSIS — C78.00 COLON CANCER METASTASIZED TO LUNG: ICD-10-CM

## 2024-10-17 DIAGNOSIS — C19 COLORECTAL CANCER, STAGE IV: ICD-10-CM

## 2024-10-17 DIAGNOSIS — C77.8 MALIGNANT NEOPLASM METASTATIC TO LYMPH NODES OF MULTIPLE SITES: Primary | ICD-10-CM

## 2024-10-17 DIAGNOSIS — I15.8 OTHER SECONDARY HYPERTENSION: ICD-10-CM

## 2024-10-17 LAB
ALBUMIN SERPL-MCNC: 3.6 G/DL (ref 3.5–5)
ALBUMIN/GLOB SERPL: 1.1 RATIO (ref 1.1–2)
ALP SERPL-CCNC: 277 UNIT/L (ref 40–150)
ALT SERPL-CCNC: 71 UNIT/L (ref 0–55)
ANION GAP SERPL CALC-SCNC: 10 MEQ/L
AST SERPL-CCNC: 62 UNIT/L (ref 5–34)
BASOPHILS # BLD AUTO: 0.01 X10(3)/MCL
BASOPHILS NFR BLD AUTO: 0.2 %
BILIRUB SERPL-MCNC: 0.4 MG/DL
BUN SERPL-MCNC: 9.8 MG/DL (ref 8.9–20.6)
CALCIUM SERPL-MCNC: 9 MG/DL (ref 8.4–10.2)
CEA SERPL-MCNC: 914.9 NG/ML (ref 0–3)
CHLORIDE SERPL-SCNC: 106 MMOL/L (ref 98–107)
CO2 SERPL-SCNC: 24 MMOL/L (ref 22–29)
CREAT SERPL-MCNC: 0.85 MG/DL (ref 0.73–1.18)
CREAT/UREA NIT SERPL: 12
EOSINOPHIL # BLD AUTO: 0.12 X10(3)/MCL (ref 0–0.9)
EOSINOPHIL NFR BLD AUTO: 2 %
ERYTHROCYTE [DISTWIDTH] IN BLOOD BY AUTOMATED COUNT: 13.6 % (ref 11.5–17)
GFR SERPLBLD CREATININE-BSD FMLA CKD-EPI: >60 ML/MIN/1.73/M2
GLOBULIN SER-MCNC: 3.4 GM/DL (ref 2.4–3.5)
GLUCOSE SERPL-MCNC: 156 MG/DL (ref 74–100)
HCT VFR BLD AUTO: 44.1 % (ref 42–52)
HGB BLD-MCNC: 14.8 G/DL (ref 14–18)
IMM GRANULOCYTES # BLD AUTO: 0.01 X10(3)/MCL (ref 0–0.04)
IMM GRANULOCYTES NFR BLD AUTO: 0.2 %
LYMPHOCYTES # BLD AUTO: 1.08 X10(3)/MCL (ref 0.6–4.6)
LYMPHOCYTES NFR BLD AUTO: 18 %
MCH RBC QN AUTO: 29.3 PG (ref 27–31)
MCHC RBC AUTO-ENTMCNC: 33.6 G/DL (ref 33–36)
MCV RBC AUTO: 87.3 FL (ref 80–94)
MONOCYTES # BLD AUTO: 0.44 X10(3)/MCL (ref 0.1–1.3)
MONOCYTES NFR BLD AUTO: 7.3 %
NEUTROPHILS # BLD AUTO: 4.33 X10(3)/MCL (ref 2.1–9.2)
NEUTROPHILS NFR BLD AUTO: 72.3 %
PLATELET # BLD AUTO: 241 X10(3)/MCL (ref 130–400)
PMV BLD AUTO: 10.5 FL (ref 7.4–10.4)
POTASSIUM SERPL-SCNC: 4 MMOL/L (ref 3.5–5.1)
PROT SERPL-MCNC: 7 GM/DL (ref 6.4–8.3)
PROT UR QL STRIP: ABNORMAL
RBC # BLD AUTO: 5.05 X10(6)/MCL (ref 4.7–6.1)
SODIUM SERPL-SCNC: 140 MMOL/L (ref 136–145)
WBC # BLD AUTO: 5.99 X10(3)/MCL (ref 4.5–11.5)

## 2024-10-17 PROCEDURE — 99215 OFFICE O/P EST HI 40 MIN: CPT | Mod: S$GLB,,, | Performed by: INTERNAL MEDICINE

## 2024-10-17 PROCEDURE — 4010F ACE/ARB THERAPY RXD/TAKEN: CPT | Mod: CPTII,S$GLB,, | Performed by: INTERNAL MEDICINE

## 2024-10-17 PROCEDURE — 85025 COMPLETE CBC W/AUTO DIFF WBC: CPT

## 2024-10-17 PROCEDURE — 1159F MED LIST DOCD IN RCRD: CPT | Mod: CPTII,S$GLB,, | Performed by: INTERNAL MEDICINE

## 2024-10-17 PROCEDURE — 99999 PR PBB SHADOW E&M-EST. PATIENT-LVL V: CPT | Mod: PBBFAC,,, | Performed by: INTERNAL MEDICINE

## 2024-10-17 PROCEDURE — 3077F SYST BP >= 140 MM HG: CPT | Mod: CPTII,S$GLB,, | Performed by: INTERNAL MEDICINE

## 2024-10-17 PROCEDURE — 3008F BODY MASS INDEX DOCD: CPT | Mod: CPTII,S$GLB,, | Performed by: INTERNAL MEDICINE

## 2024-10-17 PROCEDURE — 36415 COLL VENOUS BLD VENIPUNCTURE: CPT

## 2024-10-17 PROCEDURE — 81005 URINALYSIS: CPT

## 2024-10-17 PROCEDURE — 80053 COMPREHEN METABOLIC PANEL: CPT

## 2024-10-17 PROCEDURE — 3080F DIAST BP >= 90 MM HG: CPT | Mod: CPTII,S$GLB,, | Performed by: INTERNAL MEDICINE

## 2024-10-17 PROCEDURE — 1160F RVW MEDS BY RX/DR IN RCRD: CPT | Mod: CPTII,S$GLB,, | Performed by: INTERNAL MEDICINE

## 2024-10-17 PROCEDURE — 82378 CARCINOEMBRYONIC ANTIGEN: CPT

## 2024-10-17 RX ORDER — METOPROLOL TARTRATE 25 MG/1
25 TABLET, FILM COATED ORAL 2 TIMES DAILY
Qty: 60 TABLET | Refills: 3 | Status: SHIPPED | OUTPATIENT
Start: 2024-10-17 | End: 2025-02-14

## 2024-10-21 DIAGNOSIS — C78.00 COLON CANCER METASTASIZED TO LUNG: ICD-10-CM

## 2024-10-21 DIAGNOSIS — C18.9 COLON CANCER METASTASIZED TO LUNG: ICD-10-CM

## 2024-10-21 DIAGNOSIS — C19 COLORECTAL CANCER, STAGE IV: ICD-10-CM

## 2024-10-21 RX ORDER — ALPRAZOLAM 0.25 MG/1
0.25 TABLET ORAL 2 TIMES DAILY PRN
Qty: 60 TABLET | Refills: 0 | Status: SHIPPED | OUTPATIENT
Start: 2024-10-21 | End: 2025-10-21

## 2024-10-23 ENCOUNTER — OFFICE VISIT (OUTPATIENT)
Dept: PALLIATIVE MEDICINE | Facility: CLINIC | Age: 44
End: 2024-10-23
Payer: COMMERCIAL

## 2024-10-23 VITALS
RESPIRATION RATE: 18 BRPM | TEMPERATURE: 99 F | BODY MASS INDEX: 30.07 KG/M2 | HEIGHT: 69 IN | DIASTOLIC BLOOD PRESSURE: 93 MMHG | HEART RATE: 77 BPM | SYSTOLIC BLOOD PRESSURE: 141 MMHG | OXYGEN SATURATION: 97 % | WEIGHT: 203 LBS

## 2024-10-23 DIAGNOSIS — R45.89 ANXIETY ABOUT DYING: ICD-10-CM

## 2024-10-23 DIAGNOSIS — C77.8 MALIGNANT NEOPLASM METASTATIC TO LYMPH NODES OF MULTIPLE SITES: ICD-10-CM

## 2024-10-23 DIAGNOSIS — G89.3 CANCER RELATED PAIN: Primary | ICD-10-CM

## 2024-10-23 DIAGNOSIS — F43.21 SITUATIONAL DEPRESSION: ICD-10-CM

## 2024-10-23 PROCEDURE — 4010F ACE/ARB THERAPY RXD/TAKEN: CPT | Mod: CPTII,S$GLB,, | Performed by: INTERNAL MEDICINE

## 2024-10-23 PROCEDURE — 3080F DIAST BP >= 90 MM HG: CPT | Mod: CPTII,S$GLB,, | Performed by: INTERNAL MEDICINE

## 2024-10-23 PROCEDURE — 3077F SYST BP >= 140 MM HG: CPT | Mod: CPTII,S$GLB,, | Performed by: INTERNAL MEDICINE

## 2024-10-23 PROCEDURE — 1160F RVW MEDS BY RX/DR IN RCRD: CPT | Mod: CPTII,S$GLB,, | Performed by: INTERNAL MEDICINE

## 2024-10-23 PROCEDURE — 1159F MED LIST DOCD IN RCRD: CPT | Mod: CPTII,S$GLB,, | Performed by: INTERNAL MEDICINE

## 2024-10-23 PROCEDURE — 99215 OFFICE O/P EST HI 40 MIN: CPT | Mod: S$GLB,,, | Performed by: INTERNAL MEDICINE

## 2024-10-23 PROCEDURE — 3008F BODY MASS INDEX DOCD: CPT | Mod: CPTII,S$GLB,, | Performed by: INTERNAL MEDICINE

## 2024-10-23 PROCEDURE — 99999 PR PBB SHADOW E&M-EST. PATIENT-LVL V: CPT | Mod: PBBFAC,,, | Performed by: INTERNAL MEDICINE

## 2024-10-23 RX ORDER — DULOXETIN HYDROCHLORIDE 30 MG/1
30 CAPSULE, DELAYED RELEASE ORAL DAILY
Qty: 30 CAPSULE | Refills: 1 | Status: SHIPPED | OUTPATIENT
Start: 2024-10-23

## 2024-10-23 RX ORDER — TRAMADOL HYDROCHLORIDE 50 MG/1
50 TABLET ORAL EVERY 6 HOURS
Qty: 30 TABLET | Refills: 0 | Status: SHIPPED | OUTPATIENT
Start: 2024-10-23

## 2024-10-23 RX ORDER — LIDOCAINE 50 MG/G
1 PATCH TOPICAL DAILY
Qty: 15 PATCH | Refills: 1 | Status: SHIPPED | OUTPATIENT
Start: 2024-10-23

## 2024-10-23 NOTE — PROGRESS NOTES
Palliative Clinic    Patient ID: [id number]     Chief Complaint: Follow-up      HPI:     Owen Swanson is a 44 y.o. male here today for follow-up with palliative Medicine Clinic.  His primary diagnosis is stage IV colorectal cancer with Mets to the lung, liver, abdominal lymph nodes.  He is currently being treated with Fruquintinb, started 8/5/24 as 21 days on/ 7 days off. HTN has been noted with the treatment. Plan is to monitor labs every 2 weeks. He was seen by Dr. Bhatti with [facility] on 9/10/24. They are awaiting restaging scans to evaluate for progression of disease. If progression, they discussed 3 options for clinical trial drugs.     He was seen on 10/11/24 by Dr. Renee Gipson with review of CT imaging from 10/9/24. Areas of endobronchial soft tissue in the right upper lobe have similar overall size, solid nodule in the right lower lobe measures 17 mm, previously 16 mm. The left upper lobe nodule measures 13 mm, previously 10 mm. No definitive new suspicious nodule. Stable 11 mm right lower paratracheal lymph node. Liver lesions and periportal lymph nodes have similar sizes since July, although with mild decrease in overall attenuation.  Suspect this relates to post treatment effect. Slightly improved rectal wall thickening.  The splenic hypodensity is smaller. Current treatment plan is Fruquintinib 5 mg daily x 21 days on/7 days off started on 8/5/24. Cycle 4 to start 10/28/24.    He was last seen by me on 09/25/2024.    Symptom review:  Pain. Described as 6/10, located in his lower back primary. He also has a history of prior back strain and injuries. He is taking gabapentin 300 mg qhs prn no relief with tylenol. He is taking (4) 500 mg tabs at a time, prn.He takes this every morning and prn. He has previously taken tramadol with benefit.    Anxiety: controlled with Xanax 0.25 mg BID prn He also self medicates with marijuana (obtains from local [facility]othecary-when he can afford it.    He denies constipation,  nausea, anorexia, weight lose.     He has not taken the time to review goals of care and ACP docs with his wife. He says that he is too stressed out lately with financial problems and his inability to work steady due to fatigue (related to cancer and treatment). He has depressed thoughts about dying. He retreats from this by playing his music but also utilizes marijuana and xanax. He does not have suicidal ideation and in fact he states that he has counseled a doug against this in the past by telling him that by doing this he would not be relieving his loved ones of a burden but rather taking away their power (their wishes to have that person in their life is being taken away). I agreed with his counseling logic hold-heartedly. I provided some counseling and emotional support through this discussion.         Assessment/Plan:       1. Cancer related pain  I recommend against the use of acetaminophen 500 mg greater than 1 g at a time and no more than 3 g per day.  He may take ibuprofen or Aleve over-the-counter as long as there is no associated rectal bleeding (history of mild bleeding due to colon cancer).  We decided upon reordering tramadol 50 mg for p.r.n. use.  I advised him against concomitant use of other sedating medications such as Xanax and marijuana and gabapentin.    2. Malignant neoplasm metastatic to lymph nodes of multiple sites  Continue follow-up with Oncology    3. Situational depression  I suggested a trial of Cymbalta.  I explained that this may provide him with some pain relief as well as assist with anxiety and depression.  This may be a better option than self medicating with marijuana or concomitant use with Xanax as these are essentially causing sedation or tranquilizing effects and not assisting with pain or depression in a significant way.  He would like to discuss this with his wife and decide whether or not he wishes to utilize Cymbalta.  His wife is a nurse.    4. Anxiety about  dying  Recommendation for Cymbalta with prescription given.  Xanax as a backup p.r.n..  Recommended against the use of marijuana except as a last resort.    Other orders  -     traMADoL (ULTRAM) 50 mg tablet; Take 1 tablet (50 mg total) by mouth every 6 (six) hours.  Dispense: 30 tablet; Refill: 0  -     LIDOcaine (LIDODERM) 5 %; Place 1 patch onto the skin once daily. Remove & Discard patch within 12 hours or as directed by MD  Dispense: 15 patch; Refill: 1  -     DULoxetine (CYMBALTA) 30 MG capsule; Take 1 capsule (30 mg total) by mouth once daily.  Dispense: 30 capsule; Refill: 1                 History:     Past Medical History:   Diagnosis Date    Anxiety     Hypertension         History reviewed. No pertinent surgical history.     Social History     Tobacco Use    Smoking status: Former     Current packs/day: 0.00     Average packs/day: 0.5 packs/day for 10.0 years (5.0 ttl pk-yrs)     Types: Cigarettes     Quit date: 2016     Years since quittin.8    Smokeless tobacco: Never   Substance and Sexual Activity    Alcohol use: Never    Drug use: Yes     Types: Marijuana    Sexual activity: Yes     Partners: Female     Birth control/protection: None        Current Outpatient Medications   Medication Instructions    albuterol (PROVENTIL) 2.5 mg, Every 6 hours PRN    ALPRAZolam (XANAX) 0.25 mg, Oral, 2 times daily PRN    amLODIPine-benazepriL (LOTREL) 10-40 mg per capsule 1 capsule, Oral, Daily    ascorbic acid, vitamin C, (VITAMIN C) 1000 MG tablet 4 tablets, Nightly    cholecalciferol, vitamin D3, (VITAMIN D3) 25 mcg (1,000 unit) capsule 1 capsule, Nightly    cyanocobalamin (VITAMIN B-12) 100 MCG tablet 1 tablet, Nightly    DULoxetine (CYMBALTA) 30 mg, Oral, Daily    fruquintinib 5 mg Cap Take 1 capsule (5 mg) by mouth once daily for 21 days, then off for 7 days    gabapentin (NEURONTIN) 300 mg, Nightly    LIDOcaine (LIDODERM) 5 % 1 patch, Transdermal, Daily, Remove & Discard patch within 12 hours or as  "directed by MD    magnesium 200 mg Tab 2 tablets, Nightly    metoprolol tartrate (LOPRESSOR) 25 mg, Oral, 2 times daily    NON FORMULARY MEDICATION 2 tablets, Daily    NON FORMULARY MEDICATION 1 Piece, Daily PRN    NON FORMULARY MEDICATION Quoc Gage oil (THC oil)    omeprazole (PRILOSEC) 20 MG capsule 1 capsule, Every morning    ondansetron (ZOFRAN) 4 mg, Every 8 hours PRN    promethazine (PHENERGAN) 25 mg, Oral, Every 6 hours PRN    traMADoL (ULTRAM) 50 mg, Oral, Every 6 hours    zinc acetate 50 mg (zinc) Cap 1 tablet, Nightly       Review of patient's allergies indicates:  No Known Allergies     Patient Care Team:  Quinn Ledesma MD as PCP - General (Family Medicine)  Lamar Mc PharmD as Pharmacist (Pharmacist)     Subjective:     Review of Systems   All other systems reviewed and are negative.      12 point review of systems conducted, negative except as stated in the history of present illness. See HPI for details.    Objective:     Visit Vitals  BP (!) 141/93 (BP Location: Right arm, Patient Position: Sitting)   Pulse 77   Temp 98.7 °F (37.1 °C) (Oral)   Resp 18   Ht 5' 9" (1.753 m)   Wt 92.1 kg (203 lb)   SpO2 97%   BMI 29.98 kg/m²      Vitals:    10/23/24 1037   PainSc:   6   PainLoc: Back       Physical Exam  Vitals reviewed.   Constitutional:       Appearance: He is not ill-appearing or toxic-appearing.   HENT:      Head: Normocephalic.      Right Ear: External ear normal.      Left Ear: External ear normal.      Nose: Nose normal.      Mouth/Throat:      Mouth: Mucous membranes are moist.      Pharynx: Oropharynx is clear.   Eyes:      Extraocular Movements: Extraocular movements intact.      Conjunctiva/sclera: Conjunctivae normal.      Pupils: Pupils are equal, round, and reactive to light.   Cardiovascular:      Rate and Rhythm: Normal rate and regular rhythm.      Pulses: Normal pulses.      Heart sounds: Normal heart sounds.   Pulmonary:      Effort: Pulmonary effort is normal.      " Breath sounds: Normal breath sounds.   Abdominal:      General: Abdomen is flat. Bowel sounds are normal. There is no distension.      Palpations: Abdomen is soft.      Tenderness: There is no abdominal tenderness.   Musculoskeletal:      Right lower leg: No edema.      Left lower leg: No edema.   Skin:     General: Skin is warm.   Neurological:      Mental Status: He is alert and oriented to person, place, and time.   Psychiatric:         Mood and Affect: Mood normal.         Behavior: Behavior normal.         Thought Content: Thought content normal.         Judgment: Judgment normal.         Review of Symptoms      Symptom Assessment (ESAS 0-10 Scale)  Pain:  0  Dyspnea:  0  Anxiety:  0  Nausea:  0  Depression:  0  Anorexia:  0  Fatigue:  0  Insomnia:  0  Restlessness:  0  Agitation:  0         Pain Assessment:  OME in 24 hours:  0  Location(s):      Performance Status:  60    Living Arrangements:  Lives with spouse and Lives in home    Psychosocial/Cultural:   See Palliative Psychosocial Note: Yes  **Primary  to Follow**  Palliative Care  Consult: No        Labs Reviewed:     Chemistry:  Lab Results   Component Value Date     10/17/2024    K 4.0 10/17/2024    BUN 9.8 10/17/2024    CREATININE 0.85 10/17/2024    EGFRNORACEVR >60 10/17/2024    GLUCOSE 156 (H) 10/17/2024    CALCIUM 9.0 10/17/2024    ALKPHOS 277 (H) 10/17/2024    LABPROT 7.0 10/17/2024    ALBUMIN 3.6 10/17/2024    AST 62 (H) 10/17/2024    ALT 71 (H) 10/17/2024        Hematology:  Lab Results   Component Value Date    WBC 5.99 10/17/2024    HGB 14.8 10/17/2024    HCT 44.1 10/17/2024     10/17/2024       Urine:  Lab Results   Component Value Date    APPEARANCEUA Clear 05/28/2024    SGUA 1.027 05/28/2024    PROTEINUA 3+ (A) 10/17/2024    KETONESUA Trace (A) 05/28/2024    LEUKOCYTESUR Negative 05/28/2024    RBCUA 0-5 05/28/2024    WBCUA 0-5 05/28/2024    BACTERIA None Seen 05/28/2024    SQEPUA Trace 05/28/2024     PROTEINURINE 32.1 08/30/2024            I have reviewed the narcotic prescription data via .    Follow up in about 1 month (around 11/23/2024) for Follow Up, With MD. In addition to their scheduled follow up, the patient has also been instructed to follow up on as needed basis.     Future Appointments   Date Time Provider Department Center   11/14/2024  2:00 PM LAB, [facility] LAB [facility]   11/14/2024  2:30 PM [facility]   11/20/2024  9:00 AM Porfirio Owen MD [facility] OP PAL [facility]   12/6/2024  8:00 AM INJECTION CHAIR 01, [facility] CHEMOTHERAPY INFUSION [facility] CHEMO [facility]        Porfirio Owen MD    > 50% of 50 min of encounter was spent in chart review, face to face discussion of goals of care, symptom assessment, coordination of care and emotional support.

## 2024-11-07 NOTE — PROGRESS NOTES
Subjective:       Patient ID: Owen Swanson is a 44 y.o. male.    Rectal Cancer Stage IV--Diagnosed 23  Biopsy/pathology:   1. Colonoscopy 23--large fungating rectal mass 5-10cm from anal verge to approximately 20cm, biopsy with well differentiated colorectal adenocarcinoma.   2. Bronchoscopy with biopsy of endobronchial mass RUL biopsy done 3/16/23--metastatic poorly differentiated colonic adenocarcinoma. Foundation One with PD-L1 0%, ALK rearrangement, KRAS G12V, AKT2 amplification, APCV, FBXW7, CCND3 amplification, TP53, VEGFA amplification, TMB 8, MSI-high not detected, elevated tumor fraction.   3. Liquid Biopsy Guardant 360 done 24--KRAS G12V, APC , TP53 R248W, MSI high not detected, NRAS, BRAF, Her2 and NTRK negative.  Guardant 360 liquid biopsy done 24--KRAS G12V, APC and TP53 mutations.   Imagin. PET/CT 24--interval enlargement and increased metabolic activity of primary rectal mass, c/w local disease progression, stable hypermetabolic hepatic metastatic disease, grossly stable infiltrating tumor in RUL bronchus, suspected subtle interval enlargement of subcentimeter nodule in TIGRE and RLL.   2. CT C/A/P w/ contrast done at [facility] 3/18/24--Similar to slight increase in size of left upper and right lower lobe pulmonary nodules. Grossly similar right upper lobe peribronchial lesion and nodularity, similar to slight increase in size of conglomerate maria isabel hepatic and peripancreatic lymphadenopathy, grossly similar metastatic liver lesions.   3. PET/CT 24--Findings concerning for progression of primary rectal mass and metastatic disease.   4. CT A/P done 24--long segment rectal wall thickening and nodularity compatible with known rectal malignancy, multiple calcified abdominopelvic lymph nodes compatible with metastatic nodes. Reference portal caval lymph node measures 2.4 cm short axis, Multiple bilobar hypodense, variably calcified hepatic masses compatible with  colorectal metastases. Largest discrete lesion in the right lobe measures approximately 8.7 cm, findings similar to prior.   5. CT C/A/P done 7/24/24--progressive pulmonary metastatic disease, bulky branching endobronchial tumor anterior RUL, appears enlarged but difficult to measure, 4.5cm (prior 2.5cm), nodule at TIGRE measures 10mm (prior 7mm), nodule RLL measures 16mm (prior 12mm), progressive hepatic metastatic disease, few lesions enlarged two in hepatic segment VI measure 2.6 and 4.4cm (prior 1.9 and 4cm), majority appear stable, large pathologic lymph nodes in maria isabel hepatis have progressed, for example LN anterior to celiac artery measures 2.8cm vs. Prior 2.3cm, new 2cm splenic lesion, potentially metastatic, large rectal mass 8cm, similar, invading the mesorectal fat, grossly stable partially calcified metastatic lymph nodes in mesorectal fat and retroperitoneum.   6. CT C/A/P done 10/9/24--Areas of endobronchial soft tissue in the right upper lobe have similar overall size, solid nodule in the right lower lobe measures 17 mm, previously 16 mm. The left upper lobe nodule measures 13 mm, previously 10 mm. No definitive new suspicious nodule. Stable 11 mm right lower paratracheal lymph node. Liver lesions and periportal lymph nodes have similar sizes since July, although with mild decrease in overall attenuation.  Suspect this relates to post treatment effect. Slightly improved rectal wall thickening.  The splenic hypodensity is smaller.    Treatment history:  FOLFOX/Avastin 2/22/23 followed by maintenance.  Progression in 11/2023.   FOLFIRI/Avastin 11/2023--4/29/24 (stopped due to progression).  Alectinib 5/22/24--7/26/24 (stopped due to progression).    CEA:   05/09/24--904  06/12/24--1,131.53  07/01/24--1,191.49  07/16/24--1,496.72  08/19/24--1,073.40  09/05/24--1,349.70  09/19/24--907.29  10/01/24--1,424.78  10/17/24--914.90    Current treatment plan:   Fruquintinib 5mg daily X 21 days on/7 days off started  on 24.   Cycle 4 to start on 10/28/24.     Chief Complaint: Bloated (Pt reports abdominal fullness.) and Back Pain    HPI  Patient presents for follow-up of Stage IV rectal cancer. He is doing well. Reports increased energy. Pain is okay. He has chronic back pain, not related to his cancer, takes Tylenol and Gabapentin PRN. He continues working. Complains of bloating and constipation at times. Appetite good and weight stable. Urine protein was noted to be 3+ but 24 hour urine collected this week returned <2g. BP is good today but states it is still on the high side at home. Started on Metoprolol about 2 weeks ago. No other problems reported.     Past Medical History:   Diagnosis Date    Anxiety     Hypertension       History reviewed. No pertinent surgical history.  Family History   Problem Relation Name Age of Onset    Hypertension Mother      Coronary artery disease Father Bora Swanson Jr.     Hypertension Father Bora Sawnson Jr.     Diabetes Father Bora Swanson Jr.     Cancer Father Bora Swanson Jr.      Social History     Socioeconomic History    Marital status:    Tobacco Use    Smoking status: Former     Current packs/day: 0.00     Average packs/day: 0.5 packs/day for 10.0 years (5.0 ttl pk-yrs)     Types: Cigarettes     Quit date: 2016     Years since quittin.8    Smokeless tobacco: Never   Substance and Sexual Activity    Alcohol use: Never    Drug use: Yes     Types: Marijuana    Sexual activity: Yes     Partners: Female     Birth control/protection: None       Review of patient's allergies indicates:  No Known Allergies   Current Outpatient Medications on File Prior to Visit   Medication Sig Dispense Refill    albuterol (PROVENTIL) 2.5 mg /3 mL (0.083 %) nebulizer solution Inhale 2.5 mg into the lungs every 6 (six) hours as needed.      ALPRAZolam (XANAX) 0.25 MG tablet Take 1 tablet (0.25 mg total) by mouth 2 (two) times daily as needed for Anxiety. 60 tablet 0     amLODIPine-benazepriL (LOTREL) 10-40 mg per capsule Take 1 capsule by mouth once daily. 30 capsule 3    ascorbic acid, vitamin C, (VITAMIN C) 1000 MG tablet Take 4 tablets by mouth every evening.      cholecalciferol, vitamin D3, (VITAMIN D3) 25 mcg (1,000 unit) capsule Take 1 capsule by mouth every evening.      cyanocobalamin (VITAMIN B-12) 100 MCG tablet Take 1 tablet by mouth every evening.      fruquintinib 5 mg Cap Take 1 capsule (5 mg) by mouth once daily for 21 days, then off for 7 days 21 capsule 6    gabapentin (NEURONTIN) 300 MG capsule Take 300 mg by mouth every evening.      LIDOcaine (LIDODERM) 5 % Place 1 patch onto the skin once daily. Remove & Discard patch within 12 hours or as directed by MD 15 patch 1    magnesium 200 mg Tab Take 2 tablets by mouth every evening.      metoprolol tartrate (LOPRESSOR) 25 MG tablet Take 1 tablet (25 mg total) by mouth 2 (two) times daily. 60 tablet 3    NON FORMULARY MEDICATION Take 2 tablets by mouth Daily. Iron blood builder      NON FORMULARY MEDICATION Take 1 Piece by mouth daily as needed (pain). THC gummies. Patient stated 1 piece or less per day.      NON FORMULARY MEDICATION Quoc Gage oil (THC oil)      omeprazole (PRILOSEC) 20 MG capsule Take 1 capsule by mouth every morning.      ondansetron (ZOFRAN) 4 MG tablet Take 4 mg by mouth every 8 (eight) hours as needed.      promethazine (PHENERGAN) 25 MG tablet Take 1 tablet (25 mg total) by mouth every 6 (six) hours as needed for Nausea. 34 tablet 2    traMADoL (ULTRAM) 50 mg tablet Take 1 tablet (50 mg total) by mouth every 6 (six) hours. 30 tablet 0    zinc acetate 50 mg (zinc) Cap Take 1 tablet by mouth every evening.      amlodipine-benazepril 10-20mg (LOTREL) 10-20 mg per capsule Take 1 capsule by mouth once daily. (Patient not taking: Reported on 11/14/2024)      DULoxetine (CYMBALTA) 30 MG capsule Take 1 capsule (30 mg total) by mouth once daily. (Patient not taking: Reported on 11/14/2024) 30 capsule  "1     No current facility-administered medications on file prior to visit.      Review of Systems   Constitutional:  Positive for appetite change and fatigue. Unexpected weight change: weight fluctuates.  HENT:  Negative for mouth sores.    Eyes:  Negative for visual disturbance.   Respiratory:  Negative for shortness of breath.    Cardiovascular:  Negative for chest pain.   Gastrointestinal:  Positive for abdominal pain and constipation. Negative for diarrhea.   Musculoskeletal:  Positive for back pain.   Neurological:  Negative for headaches.   Hematological:  Negative for adenopathy.   Psychiatric/Behavioral:  The patient is nervous/anxious.          Vitals:    11/14/24 1435   BP: 125/88   Pulse: 64   Resp: 14   Temp: 97.9 °F (36.6 °C)   TempSrc: Oral   SpO2: 98%   Weight: 90.6 kg (199 lb 12.8 oz)   Height: 5' 9" (1.753 m)     Physical Exam  Constitutional:       General: He is awake.      Appearance: Normal appearance. He is normal weight.   HENT:      Head: Normocephalic and atraumatic.      Nose: Nose normal.      Mouth/Throat:      Mouth: Mucous membranes are moist.   Eyes:      General: Vision grossly intact.      Extraocular Movements: Extraocular movements intact.      Conjunctiva/sclera: Conjunctivae normal.   Cardiovascular:      Rate and Rhythm: Normal rate and regular rhythm.      Heart sounds: Normal heart sounds.   Pulmonary:      Effort: Pulmonary effort is normal.      Breath sounds: Normal breath sounds.   Chest:      Comments: Left chest wall mediport in place  Abdominal:      General: Abdomen is protuberant. Bowel sounds are normal. There is no distension.      Palpations: Abdomen is soft.      Tenderness: There is no abdominal tenderness.   Musculoskeletal:      Cervical back: Normal range of motion and neck supple.      Right lower leg: No edema.      Left lower leg: No edema.   Lymphadenopathy:      Cervical: No cervical adenopathy.      Upper Body:      Right upper body: No supraclavicular " adenopathy.      Left upper body: No supraclavicular adenopathy.   Skin:     General: Skin is warm.   Neurological:      Mental Status: He is alert and oriented to person, place, and time.      Motor: Motor function is intact.   Psychiatric:         Mood and Affect: Mood normal.         Speech: Speech normal.         Behavior: Behavior is cooperative.         Judgment: Judgment normal.       Lab Visit on 11/14/2024   Component Date Value Ref Range Status    WBC 11/14/2024 8.21  4.50 - 11.50 x10(3)/mcL Final    RBC 11/14/2024 5.45  4.70 - 6.10 x10(6)/mcL Final    Hgb 11/14/2024 15.8  14.0 - 18.0 g/dL Final    Hct 11/14/2024 45.6  42.0 - 52.0 % Final    MCV 11/14/2024 83.7  80.0 - 94.0 fL Final    MCH 11/14/2024 29.0  27.0 - 31.0 pg Final    MCHC 11/14/2024 34.6  33.0 - 36.0 g/dL Final    RDW 11/14/2024 13.7  11.5 - 17.0 % Final    Platelet 11/14/2024 246  130 - 400 x10(3)/mcL Final    MPV 11/14/2024 10.0  7.4 - 10.4 fL Final    Neut % 11/14/2024 70.5  % Final    Lymph % 11/14/2024 22.8  % Final    Mono % 11/14/2024 5.6  % Final    Eos % 11/14/2024 0.7  % Final    Basophil % 11/14/2024 0.2  % Final    Lymph # 11/14/2024 1.87  0.6 - 4.6 x10(3)/mcL Final    Neut # 11/14/2024 5.78  2.1 - 9.2 x10(3)/mcL Final    Mono # 11/14/2024 0.46  0.1 - 1.3 x10(3)/mcL Final    Eos # 11/14/2024 0.06  0 - 0.9 x10(3)/mcL Final    Baso # 11/14/2024 0.02  <=0.2 x10(3)/mcL Final    IG# 11/14/2024 0.02  0 - 0.04 x10(3)/mcL Final    IG% 11/14/2024 0.2  % Final         Assessment:       1. Malignant neoplasm metastatic to lymph nodes of multiple sites    2. Cancer related pain    3. Colorectal cancer    4. 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 Secondary malignant neoplasm of liver      Plan:       Patient with Stage IV rectal cancer, lung, liver, abdominal mariya metastases diagnosed in 2/2023. KRAS mutated.  Patient has failed FOLFOX avastin and FOLFIRI avastin.  Most Recent PET from 4/29/24 shows disease  progression and CEA rising.    In review of previous Foundation One testing, noted to have an ALK rearrangement.   I did a literature review and there have been documented responses in colon cancers treatment with ALK inhibitors.   Case presented at [facility] clinical trials tumor board. No current clinical trials available, but treatment recommended with Alectinib.   Case presented at molecular tumor board on 6/4/24, agreed with trial of Alectinib.    Discussed conventional treatment with 3rd line Stivarga vs. Lonsurf which have not historically had good outcomes. Also discussed incurable nature of disease and continued palliative goals of treatment.  Would favor a more aggressive approach to treatment given his excellent functional status and young age. She did not think that Keytruda or immunotherapy would be effective given JESUS.     Patient started Alectinib 600mg PO BID on 5/22/24.  CT done 7/24/24 shows some stable disease, but mostly progression.  Recommend to change treatment to Fruquintinib per [facility].   Received 2 units PRBC on 7/29/24 for worsening anemia.    Patient started Fruquintinib 5mg daily X 21 days on/7 days off on 8/5/24.   Patient is tolerating well.   CT C/A/P done 10/9/24 after 3 cycles shows mixed findings, improvement in liver and a few lung nodules slightly increased.   Recommend to continue with same treatment. He is tolerating well and feeling good.   CBC today is good. CMP and CEA are pending. His CEA has been up and down.   C5 due to start on 11/25/24.  Plan to repeat CT at [facility] in early 12/2024. Will send orders today.   Follow-up in 3-4 weeks after scans completed.     BP continues to be elevated. He is currently on Lotrel 10/40 daily, Added Metoprolol 25mg PO BID at his last appointment.   Baseline urine protein done 7/26/24. 24-hour urine okay with 449mg of protein.   Recent urine with 3+ protein. 24 hour <2g so we are good.     Patient will continue follow-up with palliative care for  pain management, insomnia and anxiety management.   Continue supplements with Miles Perret-  Boost Breeze or Ensure Clear supplements.   Patient has no FH of any cancer, may need genetic testing due to the APC mutation.    Seen at [facility] for clinical trial options and if progressive disease on next scans, they identified the following trial options for him:  1. 2022-0276, [facility] [place] [facility]236, [place]: Chantal  2. 2020-1340 RP-6306 (PKMYT1 kinase inhibitor), [place]: Edgar Patricia  3. 2024-0004: APR-1051 (Wee1 inhibitor). [place]: Lalitha Christianson  4. [facility] trial with Dr. Viki Frank     All questions answered at this time.      ROSIE MoonPC

## 2024-11-13 DIAGNOSIS — C78.01 MALIGNANT NEOPLASM METASTATIC TO BOTH LUNGS: ICD-10-CM

## 2024-11-13 DIAGNOSIS — C18.9 COLON CANCER METASTASIZED TO LUNG: ICD-10-CM

## 2024-11-13 DIAGNOSIS — C78.00 COLON CANCER METASTASIZED TO LUNG: ICD-10-CM

## 2024-11-13 DIAGNOSIS — C77.8 MALIGNANT NEOPLASM METASTATIC TO LYMPH NODES OF MULTIPLE SITES: Primary | ICD-10-CM

## 2024-11-13 DIAGNOSIS — C78.7 SECONDARY MALIGNANT NEOPLASM OF LIVER: ICD-10-CM

## 2024-11-13 DIAGNOSIS — C19 COLORECTAL CANCER: ICD-10-CM

## 2024-11-13 DIAGNOSIS — C19 COLORECTAL CANCER: Primary | ICD-10-CM

## 2024-11-13 DIAGNOSIS — C78.02 MALIGNANT NEOPLASM METASTATIC TO BOTH LUNGS: ICD-10-CM

## 2024-11-13 LAB
PROT 24H UR-MCNC: 715.4 MG/24 H (ref 0–165)
PROT UR STRIP-MCNC: 48.5 MG/DL
TOTAL VOLUME  (OHS): 1475 ML

## 2024-11-13 PROCEDURE — 84156 ASSAY OF PROTEIN URINE: CPT | Performed by: INTERNAL MEDICINE

## 2024-11-14 ENCOUNTER — OFFICE VISIT (OUTPATIENT)
Dept: HEMATOLOGY/ONCOLOGY | Facility: CLINIC | Age: 44
End: 2024-11-14
Payer: COMMERCIAL

## 2024-11-14 ENCOUNTER — LAB VISIT (OUTPATIENT)
Dept: LAB | Facility: HOSPITAL | Age: 44
End: 2024-11-14
Attending: NURSE PRACTITIONER
Payer: COMMERCIAL

## 2024-11-14 VITALS
DIASTOLIC BLOOD PRESSURE: 88 MMHG | WEIGHT: 199.81 LBS | OXYGEN SATURATION: 98 % | HEART RATE: 64 BPM | TEMPERATURE: 98 F | HEIGHT: 69 IN | SYSTOLIC BLOOD PRESSURE: 125 MMHG | BODY MASS INDEX: 29.59 KG/M2 | RESPIRATION RATE: 14 BRPM

## 2024-11-14 DIAGNOSIS — Z79.899 IMMUNODEFICIENCY DUE TO DRUGS: ICD-10-CM

## 2024-11-14 DIAGNOSIS — C19 COLORECTAL CANCER: ICD-10-CM

## 2024-11-14 DIAGNOSIS — C78.7 SECONDARY MALIGNANT NEOPLASM OF LIVER: ICD-10-CM

## 2024-11-14 DIAGNOSIS — C18.9 COLON CANCER METASTASIZED TO LUNG: ICD-10-CM

## 2024-11-14 DIAGNOSIS — G89.3 CANCER RELATED PAIN: ICD-10-CM

## 2024-11-14 DIAGNOSIS — C77.8 MALIGNANT NEOPLASM METASTATIC TO LYMPH NODES OF MULTIPLE SITES: ICD-10-CM

## 2024-11-14 DIAGNOSIS — C77.8 MALIGNANT NEOPLASM METASTATIC TO LYMPH NODES OF MULTIPLE SITES: Primary | ICD-10-CM

## 2024-11-14 DIAGNOSIS — D84.821 IMMUNODEFICIENCY DUE TO DRUGS: ICD-10-CM

## 2024-11-14 DIAGNOSIS — C78.00 COLON CANCER METASTASIZED TO LUNG: ICD-10-CM

## 2024-11-14 LAB
ALBUMIN SERPL-MCNC: 3.8 G/DL (ref 3.5–5)
ALBUMIN/GLOB SERPL: 0.9 RATIO (ref 1.1–2)
ALP SERPL-CCNC: 291 UNIT/L (ref 40–150)
ALT SERPL-CCNC: 58 UNIT/L (ref 0–55)
ANION GAP SERPL CALC-SCNC: 10 MEQ/L
AST SERPL-CCNC: 52 UNIT/L (ref 5–34)
BASOPHILS # BLD AUTO: 0.02 X10(3)/MCL
BASOPHILS NFR BLD AUTO: 0.2 %
BILIRUB SERPL-MCNC: 0.4 MG/DL
BUN SERPL-MCNC: 14.6 MG/DL (ref 8.9–20.6)
CALCIUM SERPL-MCNC: 10.1 MG/DL (ref 8.4–10.2)
CEA SERPL-MCNC: 922.8 NG/ML (ref 0–3)
CHLORIDE SERPL-SCNC: 102 MMOL/L (ref 98–107)
CO2 SERPL-SCNC: 26 MMOL/L (ref 22–29)
CREAT SERPL-MCNC: 1.3 MG/DL (ref 0.72–1.25)
CREAT/UREA NIT SERPL: 11
EOSINOPHIL # BLD AUTO: 0.06 X10(3)/MCL (ref 0–0.9)
EOSINOPHIL NFR BLD AUTO: 0.7 %
ERYTHROCYTE [DISTWIDTH] IN BLOOD BY AUTOMATED COUNT: 13.7 % (ref 11.5–17)
GFR SERPLBLD CREATININE-BSD FMLA CKD-EPI: >60 ML/MIN/1.73/M2
GLOBULIN SER-MCNC: 4.1 GM/DL (ref 2.4–3.5)
GLUCOSE SERPL-MCNC: 116 MG/DL (ref 74–100)
HCT VFR BLD AUTO: 45.6 % (ref 42–52)
HGB BLD-MCNC: 15.8 G/DL (ref 14–18)
IMM GRANULOCYTES # BLD AUTO: 0.02 X10(3)/MCL (ref 0–0.04)
IMM GRANULOCYTES NFR BLD AUTO: 0.2 %
LYMPHOCYTES # BLD AUTO: 1.87 X10(3)/MCL (ref 0.6–4.6)
LYMPHOCYTES NFR BLD AUTO: 22.8 %
MCH RBC QN AUTO: 29 PG (ref 27–31)
MCHC RBC AUTO-ENTMCNC: 34.6 G/DL (ref 33–36)
MCV RBC AUTO: 83.7 FL (ref 80–94)
MONOCYTES # BLD AUTO: 0.46 X10(3)/MCL (ref 0.1–1.3)
MONOCYTES NFR BLD AUTO: 5.6 %
NEUTROPHILS # BLD AUTO: 5.78 X10(3)/MCL (ref 2.1–9.2)
NEUTROPHILS NFR BLD AUTO: 70.5 %
PLATELET # BLD AUTO: 246 X10(3)/MCL (ref 130–400)
PMV BLD AUTO: 10 FL (ref 7.4–10.4)
POTASSIUM SERPL-SCNC: 4 MMOL/L (ref 3.5–5.1)
PROT SERPL-MCNC: 7.9 GM/DL (ref 6.4–8.3)
RBC # BLD AUTO: 5.45 X10(6)/MCL (ref 4.7–6.1)
SODIUM SERPL-SCNC: 138 MMOL/L (ref 136–145)
WBC # BLD AUTO: 8.21 X10(3)/MCL (ref 4.5–11.5)

## 2024-11-14 PROCEDURE — 99215 OFFICE O/P EST HI 40 MIN: CPT | Mod: S$GLB,,, | Performed by: NURSE PRACTITIONER

## 2024-11-14 PROCEDURE — 36415 COLL VENOUS BLD VENIPUNCTURE: CPT

## 2024-11-14 PROCEDURE — 3074F SYST BP LT 130 MM HG: CPT | Mod: CPTII,S$GLB,, | Performed by: NURSE PRACTITIONER

## 2024-11-14 PROCEDURE — 85025 COMPLETE CBC W/AUTO DIFF WBC: CPT

## 2024-11-14 PROCEDURE — 1160F RVW MEDS BY RX/DR IN RCRD: CPT | Mod: CPTII,S$GLB,, | Performed by: NURSE PRACTITIONER

## 2024-11-14 PROCEDURE — 4010F ACE/ARB THERAPY RXD/TAKEN: CPT | Mod: CPTII,S$GLB,, | Performed by: NURSE PRACTITIONER

## 2024-11-14 PROCEDURE — 3008F BODY MASS INDEX DOCD: CPT | Mod: CPTII,S$GLB,, | Performed by: NURSE PRACTITIONER

## 2024-11-14 PROCEDURE — 3079F DIAST BP 80-89 MM HG: CPT | Mod: CPTII,S$GLB,, | Performed by: NURSE PRACTITIONER

## 2024-11-14 PROCEDURE — 99999 PR PBB SHADOW E&M-EST. PATIENT-LVL V: CPT | Mod: PBBFAC,,, | Performed by: NURSE PRACTITIONER

## 2024-11-14 PROCEDURE — 1159F MED LIST DOCD IN RCRD: CPT | Mod: CPTII,S$GLB,, | Performed by: NURSE PRACTITIONER

## 2024-11-14 PROCEDURE — 82378 CARCINOEMBRYONIC ANTIGEN: CPT

## 2024-11-14 PROCEDURE — 80053 COMPREHEN METABOLIC PANEL: CPT

## 2024-11-14 RX ORDER — AMLODIPINE AND BENAZEPRIL HYDROCHLORIDE 10; 20 MG/1; MG/1
1 CAPSULE ORAL DAILY
COMMUNITY

## 2024-11-15 NOTE — PROGRESS NOTES
I called patient and encouraged him to drink more water. He stated understanding with no further questions at present.

## 2024-11-20 ENCOUNTER — OFFICE VISIT (OUTPATIENT)
Dept: PALLIATIVE MEDICINE | Facility: CLINIC | Age: 44
End: 2024-11-20
Payer: COMMERCIAL

## 2024-11-20 VITALS
RESPIRATION RATE: 16 BRPM | TEMPERATURE: 98 F | WEIGHT: 201 LBS | SYSTOLIC BLOOD PRESSURE: 133 MMHG | BODY MASS INDEX: 29.77 KG/M2 | DIASTOLIC BLOOD PRESSURE: 92 MMHG | OXYGEN SATURATION: 99 % | HEIGHT: 69 IN | HEART RATE: 69 BPM

## 2024-11-20 DIAGNOSIS — R11.0 NAUSEA: ICD-10-CM

## 2024-11-20 DIAGNOSIS — C78.00 COLON CANCER METASTASIZED TO LUNG: Primary | ICD-10-CM

## 2024-11-20 DIAGNOSIS — F33.9 DEPRESSION, RECURRENT: ICD-10-CM

## 2024-11-20 DIAGNOSIS — G89.3 CANCER-RELATED BREAKTHROUGH PAIN: ICD-10-CM

## 2024-11-20 DIAGNOSIS — C19 COLORECTAL CANCER, STAGE IV: ICD-10-CM

## 2024-11-20 DIAGNOSIS — C18.9 COLON CANCER METASTASIZED TO LUNG: Primary | ICD-10-CM

## 2024-11-20 DIAGNOSIS — R45.89 ANXIETY ABOUT DYING: ICD-10-CM

## 2024-11-20 PROCEDURE — 99999 PR PBB SHADOW E&M-EST. PATIENT-LVL IV: CPT | Mod: PBBFAC,,, | Performed by: INTERNAL MEDICINE

## 2024-11-20 PROCEDURE — 3080F DIAST BP >= 90 MM HG: CPT | Mod: CPTII,S$GLB,, | Performed by: INTERNAL MEDICINE

## 2024-11-20 PROCEDURE — 4010F ACE/ARB THERAPY RXD/TAKEN: CPT | Mod: CPTII,S$GLB,, | Performed by: INTERNAL MEDICINE

## 2024-11-20 PROCEDURE — 1159F MED LIST DOCD IN RCRD: CPT | Mod: CPTII,S$GLB,, | Performed by: INTERNAL MEDICINE

## 2024-11-20 PROCEDURE — 99215 OFFICE O/P EST HI 40 MIN: CPT | Mod: S$GLB,,, | Performed by: INTERNAL MEDICINE

## 2024-11-20 PROCEDURE — 3008F BODY MASS INDEX DOCD: CPT | Mod: CPTII,S$GLB,, | Performed by: INTERNAL MEDICINE

## 2024-11-20 PROCEDURE — 3075F SYST BP GE 130 - 139MM HG: CPT | Mod: CPTII,S$GLB,, | Performed by: INTERNAL MEDICINE

## 2024-11-20 RX ORDER — ALPRAZOLAM 0.25 MG/1
0.25 TABLET ORAL 2 TIMES DAILY PRN
Qty: 60 TABLET | Refills: 0 | Status: SHIPPED | OUTPATIENT
Start: 2024-11-20 | End: 2025-11-20

## 2024-11-20 RX ORDER — PROMETHAZINE HYDROCHLORIDE 25 MG/1
25 TABLET ORAL EVERY 6 HOURS PRN
Qty: 34 TABLET | Refills: 2 | Status: SHIPPED | OUTPATIENT
Start: 2024-11-20

## 2024-11-20 RX ORDER — TRAMADOL HYDROCHLORIDE 50 MG/1
50 TABLET ORAL EVERY 6 HOURS
Qty: 30 TABLET | Refills: 0 | Status: SHIPPED | OUTPATIENT
Start: 2024-11-20

## 2024-11-20 NOTE — PROGRESS NOTES
Palliative Clinic    Patient ID: [id number]     Chief Complaint: Follow-up      HPI:     Owen Swanson is a 44 y.o. male here today for follow up with palliative medicine clinic.   His primary diagnosis is stage IV colorectal cancer with Mets to the lung, liver, abdominal lymph nodes.  He is currently being treated with Fruquintinb, started 8/5/24 as 21 days on/ 7 days off. HTN has been noted with the treatment. Plan is to monitor labs every 2 weeks. He was seen by Dr. Bhatti with [facility] on 9/10/24. They are awaiting restaging scans to evaluate for progression of disease. If progression, they discussed 3 options for clinical trial drugs.      He was seen on 10/11/24 by Dr. Renee Gipson with review of CT imaging from 10/9/24. Areas of endobronchial soft tissue in the right upper lobe have similar overall size, solid nodule in the right lower lobe measures 17 mm, previously 16 mm. The left upper lobe nodule measures 13 mm, previously 10 mm. No definitive new suspicious nodule. Stable 11 mm right lower paratracheal lymph node. Liver lesions and periportal lymph nodes have similar sizes since July, although with mild decrease in overall attenuation.  Suspect this relates to post treatment effect. Slightly improved rectal wall thickening.  The splenic hypodensity is smaller. Current treatment plan is Fruquintinib 5 mg daily x 21 days on/7 days off started on 8/5/24. Cycle 4 to start 10/28/24. He is planned to have C5 on 11/25/24.    He was last seen by me on 10/23/24. For breakthrough pain, Tramadol 50 mg was ordered for prn use. Cymbalta was given for situational depression. He has a lidoderm 5% patch which he applies on his lower back.    Symptom review:    Pain. Lower back and lower abdomen. Pain is mainly pressure-like. It comes and goes. It is 3/10, which is tolerable. It is relieved with prn tramadol, gabapentin and tylenol. He also notes intermittent severe sharp pains into his anorectum. Pain lasts a few seconds  only. This is infrequent, maybe once per day.     Nausea. Relieved with prn phenergan    Constipation. He has some difficulty with increased sensation in anorectum sometimes he needs to manually disimpact himself (no true impaction but feels stuck).     Anxiety. Controlled with prn xanax.     He states that anxiety and depression are controlled with current daily activities and talk therapy with friends. He enjoys his music and work. He mostly has anxiety over finances. 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    He is tolerating his oral chemo regimen without complaints and wishes to continue this.      Assessment/Plan:       1. Colon cancer metastasized to lung  Follow with oncology.  -     ALPRAZolam (XANAX) 0.25 MG tablet; Take 1 tablet (0.25 mg total) by mouth 2 (two) times daily as needed for Anxiety.  Dispense: 60 tablet; Refill: 0  -     traMADoL (ULTRAM) 50 mg tablet; Take 1 tablet (50 mg total) by mouth every 6 (six) hours.  Dispense: 30 tablet; Refill: 0    2. Cancer-related breakthrough pain  Refill tramadol today. He continues lidoderm patch with benefit. I will order rectal rockets for trial due to anorectal hypersensitivity and related pain/ pressure.    3. Anxiety about dying and finances  I encouraged counseling with Jane, the LCSW with our oncology clinic. He says that he will think about this.    4. Depression, recurrent  He has not taken cymbalta after talking with his wife who fears side effects of mood instability.     5. Colorectal cancer, stage IV  Follow with oncology  -     ALPRAZolam (XANAX) 0.25 MG tablet; Take 1 tablet (0.25 mg total) by mouth 2 (two) times daily as needed for Anxiety.  Dispense: 60 tablet; Refill: 0    6. Nausea  Refill phenergan  -     promethazine (PHENERGAN) 25 MG tablet; Take 1 tablet (25 mg total) by mouth every 6 (six) hours as needed for Nausea.  Dispense: 34 tablet; Refill:  2    Other orders  -     CMPD hydrocortisone 2%- LIDOcaine 3% suppository (RECTAL ROCKET); Place 1 suppository rectally every 6 (six) hours as needed (anorectal pain). Insert 1 suppository 3/4 of the way onto rectum. Wet for easier application. Repeat for 3 nights.  Dispense: 12 applicator; Refill: 1                 History:     Past Medical History:   Diagnosis Date    Anxiety     Hypertension         History reviewed. No pertinent surgical history.     Social History     Tobacco Use    Smoking status: Former     Current packs/day: 0.00     Average packs/day: 0.5 packs/day for 10.0 years (5.0 ttl pk-yrs)     Types: Cigarettes     Quit date: 2016     Years since quittin.8    Smokeless tobacco: Never   Substance and Sexual Activity    Alcohol use: Never    Drug use: Yes     Types: Marijuana    Sexual activity: Yes     Partners: Female     Birth control/protection: None        Current Outpatient Medications   Medication Instructions    albuterol (PROVENTIL) 2.5 mg, Every 6 hours PRN    ALPRAZolam (XANAX) 0.25 mg, Oral, 2 times daily PRN    amLODIPine-benazepriL (LOTREL) 10-40 mg per capsule 1 capsule, Oral, Daily    amlodipine-benazepril 10-20mg (LOTREL) 10-20 mg per capsule 1 capsule, Daily    ascorbic acid, vitamin C, (VITAMIN C) 1000 MG tablet 4 tablets, Nightly    cholecalciferol, vitamin D3, (VITAMIN D3) 25 mcg (1,000 unit) capsule 1 capsule, Nightly    CMPD hydrocortisone 2%- LIDOcaine 3% suppository (RECTAL ROCKET) 1 suppository, Rectal, Every 6 hours PRN, Insert 1 suppository 3/4 of the way onto rectum. Wet for easier application. Repeat for 3 nights.    cyanocobalamin (VITAMIN B-12) 100 MCG tablet 1 tablet, Nightly    DULoxetine (CYMBALTA) 30 mg, Oral, Daily    fruquintinib 5 mg Cap Take 1 capsule (5 mg) by mouth once daily for 21 days, then off for 7 days    gabapentin (NEURONTIN) 300 mg, Nightly    LIDOcaine (LIDODERM) 5 % 1 patch, Transdermal, Daily, Remove & Discard patch within 12 hours or as  "directed by MD    magnesium 200 mg Tab 2 tablets, Nightly    metoprolol tartrate (LOPRESSOR) 25 mg, Oral, 2 times daily    NON FORMULARY MEDICATION 2 tablets, Daily    NON FORMULARY MEDICATION 1 Piece, Daily PRN    NON FORMULARY MEDICATION Quoc Gage oil (THC oil)    omeprazole (PRILOSEC) 20 MG capsule 1 capsule, Every morning    ondansetron (ZOFRAN) 4 mg, Every 8 hours PRN    promethazine (PHENERGAN) 25 mg, Oral, Every 6 hours PRN    traMADoL (ULTRAM) 50 mg, Oral, Every 6 hours    zinc acetate 50 mg (zinc) Cap 1 tablet, Nightly       Review of patient's allergies indicates:  No Known Allergies     Patient Care Team:  Quinn Ledesma MD as PCP - General (Family Medicine)  Lamar Mc PharmD as Pharmacist (Pharmacist)     Subjective:     Review of Systems   All other systems reviewed and are negative.      12 point review of systems conducted, negative except as stated in the history of present illness. See HPI for details.    Objective:     Visit Vitals  BP (!) 133/92 (BP Location: Right arm, Patient Position: Sitting)   Pulse 69   Temp 97.9 °F (36.6 °C) (Oral)   Resp 16   Ht 5' 9" (1.753 m)   Wt 91.2 kg (201 lb)   SpO2 99%   BMI 29.68 kg/m²      Vitals:    11/20/24 0933   PainSc:   2   PainLoc: Rectum       Physical Exam  Vitals reviewed.   Constitutional:       Appearance: He is normal weight. He is not ill-appearing or toxic-appearing.   HENT:      Head: Normocephalic.      Right Ear: External ear normal.      Left Ear: External ear normal.      Nose: Nose normal.      Mouth/Throat:      Mouth: Mucous membranes are moist.      Pharynx: Oropharynx is clear.   Eyes:      Extraocular Movements: Extraocular movements intact.      Conjunctiva/sclera: Conjunctivae normal.      Pupils: Pupils are equal, round, and reactive to light.   Cardiovascular:      Rate and Rhythm: Normal rate and regular rhythm.      Pulses: Normal pulses.      Heart sounds: Normal heart sounds.   Pulmonary:      Effort: Pulmonary " effort is normal.      Breath sounds: Normal breath sounds.   Abdominal:      General: Abdomen is flat. Bowel sounds are normal. There is no distension.      Palpations: Abdomen is soft.      Tenderness: There is no abdominal tenderness.   Musculoskeletal:         General: Normal range of motion.   Skin:     General: Skin is warm.   Neurological:      General: No focal deficit present.      Mental Status: He is alert and oriented to person, place, and time.   Psychiatric:         Mood and Affect: Mood normal.         Behavior: Behavior normal.         Thought Content: Thought content normal.         Judgment: Judgment normal.         Review of Symptoms      Symptom Assessment (ESAS 0-10 Scale)  Pain:  0  Dyspnea:  0  Anxiety:  0  Nausea:  0  Depression:  0  Anorexia:  0  Fatigue:  0  Insomnia:  0  Restlessness:  0  Agitation:  0         Performance Status:  70    Living Arrangements:  Lives with spouse and Lives in home    Psychosocial/Cultural:   See Palliative Psychosocial Note: Yes  Recommended / counseling.  **Primary  to Follow**  Palliative Care  Consult: No        Labs Reviewed:     Chemistry:  Lab Results   Component Value Date     11/14/2024    K 4.0 11/14/2024    BUN 14.6 11/14/2024    CREATININE 1.30 (H) 11/14/2024    EGFRNORACEVR >60 11/14/2024    GLUCOSE 116 (H) 11/14/2024    CALCIUM 10.1 11/14/2024    ALKPHOS 291 (H) 11/14/2024    LABPROT 7.9 11/14/2024    ALBUMIN 3.8 11/14/2024    AST 52 (H) 11/14/2024    ALT 58 (H) 11/14/2024        Hematology:  Lab Results   Component Value Date    WBC 8.21 11/14/2024    HGB 15.8 11/14/2024    HCT 45.6 11/14/2024     11/14/2024       Urine:  Lab Results   Component Value Date    APPEARANCEUA Clear 05/28/2024    SGUA 1.027 05/28/2024    PROTEINUA 3+ (A) 10/17/2024    KETONESUA Trace (A) 05/28/2024    LEUKOCYTESUR Negative 05/28/2024    RBCUA 0-5 05/28/2024    WBCUA 0-5 05/28/2024    BACTERIA None Seen 05/28/2024     SQEPUA Trace 05/28/2024    PROTEINURINE 48.5 11/13/2024            I have reviewed the narcotic prescription data via .    Follow up in about 1 month (around 12/20/2024) for Follow Up. In addition to their scheduled follow up, the patient has also been instructed to follow up on as needed basis.     Future Appointments   Date Time Provider Department Center   12/2/2024  1:00 PM [facility] CT1 500 LB LIMIT [facility]   12/2/2024  1:30 PM [facility] CT1 500 LB LIMIT [facility]   12/6/2024  8:00 AM INJECTION CHAIR 01, [facility] CHEMOTHERAPY INFUSION [facility] CHEMO [facility]   12/9/2024 10:20 AM LAB, [facility] OLB LAB [facility]   12/9/2024 11:00 AM [facility]   12/18/2024 10:30 AM Porfirio Owen MD [facility] OP PAL [facility]   2/28/2025  8:00 AM INJECTION CHAIR 03, [facility] CHEMOTHERAPY INFUSION [facility] CHEMO [facility]        Porfirio Owen MD    > 50% of 45 min of encounter was spent in chart review, face to face discussion of goals of care, symptom assessment, coordination of care and emotional support.

## 2024-12-03 NOTE — PROGRESS NOTES
Subjective:       Patient ID: Owen Swanson is a 44 y.o. male.    Rectal Cancer Stage IV--Diagnosed 23  Biopsy/pathology:   1. Colonoscopy 23--large fungating rectal mass 5-10cm from anal verge to approximately 20cm, biopsy with well differentiated colorectal adenocarcinoma.   2. Bronchoscopy with biopsy of endobronchial mass RUL biopsy done 3/16/23--metastatic poorly differentiated colonic adenocarcinoma. Foundation One with PD-L1 0%, ALK rearrangement, KRAS G12V, AKT2 amplification, APCV, FBXW7, CCND3 amplification, TP53, VEGFA amplification, TMB 8, MSI-high not detected, elevated tumor fraction.   3. Liquid Biopsy Guardant 360 done 24--KRAS G12V, APC , TP53 R248W, MSI high not detected, NRAS, BRAF, Her2 and NTRK negative.  Guardant 360 liquid biopsy done 24--KRAS G12V, APC and TP53 mutations.   Imagin. PET/CT 24--interval enlargement and increased metabolic activity of primary rectal mass, c/w local disease progression, stable hypermetabolic hepatic metastatic disease, grossly stable infiltrating tumor in RUL bronchus, suspected subtle interval enlargement of subcentimeter nodule in TIGRE and RLL.   2. CT C/A/P w/ contrast done at [facility] 3/18/24--Similar to slight increase in size of left upper and right lower lobe pulmonary nodules. Grossly similar right upper lobe peribronchial lesion and nodularity, similar to slight increase in size of conglomerate maria isabel hepatic and peripancreatic lymphadenopathy, grossly similar metastatic liver lesions.   3. PET/CT 24--Findings concerning for progression of primary rectal mass and metastatic disease.   4. CT A/P done 24--long segment rectal wall thickening and nodularity compatible with known rectal malignancy, multiple calcified abdominopelvic lymph nodes compatible with metastatic nodes. Reference portal caval lymph node measures 2.4 cm short axis, Multiple bilobar hypodense, variably calcified hepatic masses compatible with  colorectal metastases. Largest discrete lesion in the right lobe measures approximately 8.7 cm, findings similar to prior.   5. CT C/A/P done 7/24/24--progressive pulmonary metastatic disease, bulky branching endobronchial tumor anterior RUL, appears enlarged but difficult to measure, 4.5cm (prior 2.5cm), nodule at TIGRE measures 10mm (prior 7mm), nodule RLL measures 16mm (prior 12mm), progressive hepatic metastatic disease, few lesions enlarged two in hepatic segment VI measure 2.6 and 4.4cm (prior 1.9 and 4cm), majority appear stable, large pathologic lymph nodes in maria isabel hepatis have progressed, for example LN anterior to celiac artery measures 2.8cm vs. Prior 2.3cm, new 2cm splenic lesion, potentially metastatic, large rectal mass 8cm, similar, invading the mesorectal fat, grossly stable partially calcified metastatic lymph nodes in mesorectal fat and retroperitoneum.   6. CT C/A/P done 10/9/24--Areas of endobronchial soft tissue in the right upper lobe have similar overall size, solid nodule in the right lower lobe measures 17 mm, previously 16 mm. The left upper lobe nodule measures 13 mm, previously 10 mm. No definitive new suspicious nodule. Stable 11 mm right lower paratracheal lymph node. Liver lesions and periportal lymph nodes have similar sizes since July, although with mild decrease in overall attenuation.  Suspect this relates to post treatment effect. Slightly improved rectal wall thickening.  The splenic hypodensity is smaller.    Treatment history:  FOLFOX/Avastin 2/22/23 followed by maintenance.  Progression in 11/2023.   FOLFIRI/Avastin 11/2023--4/29/24 (stopped due to progression).  Alectinib 5/22/24--7/26/24 (stopped due to progression).    CEA:   05/09/24--904  06/12/24--1,131.53  07/01/24--1,191.49  07/16/24--1,496.72  08/19/24--1,073.40  09/05/24--1,349.70  09/19/24--907.29  10/01/24--1,424.78  10/17/24--914.90  11/14/24--922.8  12/09/24--825.35    Current treatment plan:   Fruquintinib 5mg  daily X 21 days on/7 days off started on 24.   Cycle 5 started on 24.     Chief Complaint: 3 MO F/U    HPI  Patient presents for follow-up of Stage IV rectal cancer. He is doing well. Reports ongoing back pain, controlled with Tramadol and Gabapentin. Mentions that when he has a bowel movement, it is then easier for him to urinate. This is stable, but he has just noticed it going on. No blood in urine or foul-smelling urine or debris. He continues on the Fruquintinib and tolerating well. BP cuff at home does not seem to be accurate. BP here is good. He had to reschedule his scans. Recent CEA from today is down.     Past Medical History:   Diagnosis Date    Anxiety     Hypertension       History reviewed. No pertinent surgical history.  Family History   Problem Relation Name Age of Onset    Hypertension Mother      Coronary artery disease Father Bora Swanson Jr.     Hypertension Father Bora Swanson Jr.     Diabetes Father Boar Swanson Jr.     Cancer Father Bora Swanson Jr.      Social History     Socioeconomic History    Marital status:    Tobacco Use    Smoking status: Former     Current packs/day: 0.00     Average packs/day: 0.5 packs/day for 10.0 years (5.0 ttl pk-yrs)     Types: Cigarettes     Quit date: 2016     Years since quittin.9    Smokeless tobacco: Never   Substance and Sexual Activity    Alcohol use: Never    Drug use: Yes     Types: Marijuana    Sexual activity: Yes     Partners: Female     Birth control/protection: None       Review of patient's allergies indicates:  No Known Allergies   Current Outpatient Medications on File Prior to Visit   Medication Sig Dispense Refill    albuterol (PROVENTIL) 2.5 mg /3 mL (0.083 %) nebulizer solution Inhale 2.5 mg into the lungs every 6 (six) hours as needed.      ALPRAZolam (XANAX) 0.25 MG tablet Take 1 tablet (0.25 mg total) by mouth 2 (two) times daily as needed for Anxiety. 60 tablet 0    amLODIPine-benazepriL (LOTREL)  10-40 mg per capsule Take 1 capsule by mouth once daily. 30 capsule 3    ascorbic acid, vitamin C, (VITAMIN C) 1000 MG tablet Take 4 tablets by mouth every evening.      cholecalciferol, vitamin D3, (VITAMIN D3) 25 mcg (1,000 unit) capsule Take 1 capsule by mouth every evening.      CMPD hydrocortisone 2%- LIDOcaine 3% suppository (RECTAL ROCKET) Place 1 suppository rectally every 6 (six) hours as needed (anorectal pain). Insert 1 suppository 3/4 of the way onto rectum. Wet for easier application. Repeat for 3 nights. 12 applicator 1    cyanocobalamin (VITAMIN B-12) 100 MCG tablet Take 1 tablet by mouth every evening.      DULoxetine (CYMBALTA) 30 MG capsule Take 1 capsule (30 mg total) by mouth once daily. (Patient taking differently: Take 30 mg by mouth 2 (two) times daily as needed.) 30 capsule 1    fruquintinib 5 mg Cap Take 1 capsule (5 mg) by mouth once daily for 21 days, then off for 7 days 21 capsule 6    gabapentin (NEURONTIN) 300 MG capsule Take 300 mg by mouth every evening.      LIDOcaine (LIDODERM) 5 % Place 1 patch onto the skin once daily. Remove & Discard patch within 12 hours or as directed by MD 15 patch 1    magnesium 200 mg Tab Take 2 tablets by mouth every evening.      metoprolol tartrate (LOPRESSOR) 25 MG tablet Take 1 tablet (25 mg total) by mouth 2 (two) times daily. 60 tablet 3    NON FORMULARY MEDICATION Take 2 tablets by mouth Daily. Iron blood builder      NON FORMULARY MEDICATION Take 1 Piece by mouth daily as needed (pain). THC gummies. Patient stated 1 piece or less per day.      NON FORMULARY MEDICATION Quoc Gage oil (THC oil)      omeprazole (PRILOSEC) 20 MG capsule Take 1 capsule by mouth every morning.      ondansetron (ZOFRAN) 4 MG tablet Take 4 mg by mouth every 8 (eight) hours as needed.      promethazine (PHENERGAN) 25 MG tablet Take 1 tablet (25 mg total) by mouth every 6 (six) hours as needed for Nausea. 34 tablet 2    traMADoL (ULTRAM) 50 mg tablet Take 1 tablet (50 mg  "total) by mouth every 6 (six) hours. 30 tablet 0    zinc acetate 50 mg (zinc) Cap Take 1 tablet by mouth every evening.      amlodipine-benazepril 10-20mg (LOTREL) 10-20 mg per capsule Take 1 capsule by mouth once daily. (Patient not taking: Reported on 12/9/2024)       No current facility-administered medications on file prior to visit.      Review of Systems   Constitutional:  Positive for appetite change and fatigue. Unexpected weight change: weight fluctuates.  HENT:  Negative for mouth sores.    Eyes:  Negative for visual disturbance.   Respiratory:  Negative for shortness of breath.    Cardiovascular:  Negative for chest pain.   Gastrointestinal:  Positive for abdominal pain and constipation. Negative for diarrhea.   Musculoskeletal:  Positive for back pain.   Neurological:  Negative for headaches.   Hematological:  Negative for adenopathy.   Psychiatric/Behavioral:  The patient is nervous/anxious.          Vitals:    12/09/24 1105   BP: 127/85   Pulse: 68   Resp: 18   Temp: 98 °F (36.7 °C)   TempSrc: Oral   SpO2: 98%   Weight: 89.6 kg (197 lb 9.6 oz)   Height: 5' 9" (1.753 m)     Wt Readings from Last 3 Encounters:   12/09/24 1105 89.6 kg (197 lb 9.6 oz)   11/20/24 0933 91.2 kg (201 lb)   11/14/24 1435 90.6 kg (199 lb 12.8 oz)     Physical Exam  Constitutional:       General: He is awake.      Appearance: Normal appearance. He is normal weight.   HENT:      Head: Normocephalic and atraumatic.      Nose: Nose normal.      Mouth/Throat:      Mouth: Mucous membranes are moist.   Eyes:      General: Vision grossly intact.      Extraocular Movements: Extraocular movements intact.      Conjunctiva/sclera: Conjunctivae normal.   Cardiovascular:      Rate and Rhythm: Normal rate and regular rhythm.      Heart sounds: Normal heart sounds.   Pulmonary:      Effort: Pulmonary effort is normal.      Breath sounds: Normal breath sounds.   Chest:      Comments: Left chest wall mediport in place  Abdominal:      General: " Abdomen is protuberant. Bowel sounds are normal. There is no distension.      Palpations: Abdomen is soft.      Tenderness: There is no abdominal tenderness.   Musculoskeletal:      Cervical back: Normal range of motion and neck supple.      Right lower leg: No edema.      Left lower leg: No edema.   Lymphadenopathy:      Cervical: No cervical adenopathy.      Upper Body:      Right upper body: No supraclavicular adenopathy.      Left upper body: No supraclavicular adenopathy.   Skin:     General: Skin is warm.   Neurological:      Mental Status: He is alert and oriented to person, place, and time.      Motor: Motor function is intact.   Psychiatric:         Mood and Affect: Mood normal.         Speech: Speech normal.         Behavior: Behavior is cooperative.         Judgment: Judgment normal.       Lab Visit on 12/09/2024   Component Date Value Ref Range Status    Sodium 12/09/2024 136  136 - 145 mmol/L Final    Potassium 12/09/2024 3.9  3.5 - 5.1 mmol/L Final    Chloride 12/09/2024 104  98 - 107 mmol/L Final    CO2 12/09/2024 24  22 - 29 mmol/L Final    Glucose 12/09/2024 149 (H)  74 - 100 mg/dL Final    Blood Urea Nitrogen 12/09/2024 10.9  8.9 - 20.6 mg/dL Final    Creatinine 12/09/2024 0.78  0.72 - 1.25 mg/dL Final    Calcium 12/09/2024 9.1  8.4 - 10.2 mg/dL Final    Protein Total 12/09/2024 7.4  6.4 - 8.3 gm/dL Final    Albumin 12/09/2024 3.6  3.5 - 5.0 g/dL Final    Globulin 12/09/2024 3.8 (H)  2.4 - 3.5 gm/dL Final    Albumin/Globulin Ratio 12/09/2024 0.9 (L)  1.1 - 2.0 ratio Final    Bilirubin Total 12/09/2024 0.5  <=1.5 mg/dL Final    ALP 12/09/2024 288 (H)  40 - 150 unit/L Final    ALT 12/09/2024 56 (H)  0 - 55 unit/L Final    AST 12/09/2024 56 (H)  5 - 34 unit/L Final    eGFR 12/09/2024 >60  mL/min/1.73/m2 Final    Anion Gap 12/09/2024 8.0  mEq/L Final    BUN/Creatinine Ratio 12/09/2024 14   Final    Carcinoembryonic Antigen 12/09/2024 825.35 (H)  0.00 - 3.00 ng/mL Final    WBC 12/09/2024 7.43  4.50 -  11.50 x10(3)/mcL Final    RBC 12/09/2024 5.02  4.70 - 6.10 x10(6)/mcL Final    Hgb 12/09/2024 14.9  14.0 - 18.0 g/dL Final    Hct 12/09/2024 42.8  42.0 - 52.0 % Final    MCV 12/09/2024 85.3  80.0 - 94.0 fL Final    MCH 12/09/2024 29.7  27.0 - 31.0 pg Final    MCHC 12/09/2024 34.8  33.0 - 36.0 g/dL Final    RDW 12/09/2024 14.1  11.5 - 17.0 % Final    Platelet 12/09/2024 259  130 - 400 x10(3)/mcL Final    MPV 12/09/2024 10.2  7.4 - 10.4 fL Final    Neut % 12/09/2024 70.0  % Final    Lymph % 12/09/2024 21.5  % Final    Mono % 12/09/2024 6.1  % Final    Eos % 12/09/2024 2.0  % Final    Basophil % 12/09/2024 0.3  % Final    Lymph # 12/09/2024 1.60  0.6 - 4.6 x10(3)/mcL Final    Neut # 12/09/2024 5.20  2.1 - 9.2 x10(3)/mcL Final    Mono # 12/09/2024 0.45  0.1 - 1.3 x10(3)/mcL Final    Eos # 12/09/2024 0.15  0 - 0.9 x10(3)/mcL Final    Baso # 12/09/2024 0.02  <=0.2 x10(3)/mcL Final    IG# 12/09/2024 0.01  0 - 0.04 x10(3)/mcL Final    IG% 12/09/2024 0.1  % Final    Protein, UA 12/09/2024 2+ (A)  Negative Final         Assessment:       1. Malignant neoplasm metastatic to lymph nodes of multiple sites    2. Malignant neoplasm metastatic to both lungs    3. Secondary malignant neoplasm of liver    4. Rectal cancer    5. 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 KRAS mutated.  Patient has failed FOLFOX avastin and FOLFIRI avastin.  Most Recent PET from 4/29/24 shows disease progression and CEA rising.    In review of previous Foundation One testing, noted to have an ALK rearrangement.   I did a literature review and there have been documented responses in colon cancers treatment with ALK inhibitors.   Case presented at [facility] clinical trials tumor board. No current clinical trials available, but treatment recommended with Alectinib.   Case presented at molecular tumor board on 6/4/24, agreed with trial of  Alectinib.    Discussed conventional treatment with 3rd line Stivarga vs. Lonsurf which have not historically had good outcomes. Also discussed incurable nature of disease and continued palliative goals of treatment.  Would favor a more aggressive approach to treatment given his excellent functional status and young age. She did not think that Keytruda or immunotherapy would be effective given JESUS.     Patient started Alectinib 600mg PO BID on 5/22/24.  CT done 7/24/24 shows some stable disease, but mostly progression.  Recommend to change treatment to Fruquintinib per [facility].   Received 2 units PRBC on 7/29/24 for worsening anemia.    Patient started Fruquintinib 5mg daily X 21 days on/7 days off on 8/5/24.   Patient is tolerating well.   CT C/A/P done 10/9/24 after 3 cycles showed mixed findings, improvement in liver and a few lung nodules slightly increased.     Recommend to continue with same treatment. He is tolerating well and feeling good.   Patient started C5 on 11/25/24 and tolerating well.     CBC today is good. CMP with mildly elevated LFTs, unchanged, Alk phos also elevated, and unchanged. CEA coming down.   C6 due to start on 12/23/24.    Plan to repeat CT at [facility]. Patient had to reschedule.    Follow-up in 4 weeks with repeat labs. I can call him with scan results.     BP is better today. Remains on Lotrel 10/40 daily, added Metoprolol 25mg PO BID.  He will bring his home BP cuff to compare to ours. He does not think it is accurate.    Baseline urine protein done 7/26/24. 24-hour urine okay with 449mg of protein.   Urine again with 3+ protein. 24 hour repeated in 11/2024 <2g so we are good.     No need to keep checking urine protein once a month. May repeat in 3-4 months.     Patient will continue follow-up with palliative care for pain management, insomnia and anxiety management.   Continue supplements with Carlos Alberto Reynoso--Boost Breeze or Ensure Clear supplements.   Patient has no FH of any cancer,  may need genetic testing due to the APC mutation.    Seen at [facility] for clinical trial options and if progressive disease on next scans, they identified the following trial options for him:  1. 2022-0276, [facility] 6236, [place]: Chantal  2. 2020-1340 RP-[id number] (PKMYT1 kinase inhibitor), [place]: Edgar Patricia  3. 2024-0004: APR-[id number] (Wee1 inhibitor). [place]: Lalitha Christianson  4. [facility] trial with Dr. Viki Frank     All questions answered at this time.      Renee Gipson MD

## 2024-12-09 ENCOUNTER — INFUSION (OUTPATIENT)
Dept: INFUSION THERAPY | Facility: HOSPITAL | Age: 44
End: 2024-12-09
Attending: INTERNAL MEDICINE
Payer: COMMERCIAL

## 2024-12-09 ENCOUNTER — LAB VISIT (OUTPATIENT)
Dept: LAB | Facility: HOSPITAL | Age: 44
End: 2024-12-09
Attending: INTERNAL MEDICINE
Payer: COMMERCIAL

## 2024-12-09 ENCOUNTER — OFFICE VISIT (OUTPATIENT)
Dept: HEMATOLOGY/ONCOLOGY | Facility: CLINIC | Age: 44
End: 2024-12-09
Payer: COMMERCIAL

## 2024-12-09 VITALS
BODY MASS INDEX: 29.27 KG/M2 | SYSTOLIC BLOOD PRESSURE: 127 MMHG | TEMPERATURE: 98 F | RESPIRATION RATE: 18 BRPM | DIASTOLIC BLOOD PRESSURE: 85 MMHG | HEIGHT: 69 IN | WEIGHT: 197.63 LBS | HEART RATE: 68 BPM | OXYGEN SATURATION: 98 %

## 2024-12-09 DIAGNOSIS — T50.905A HYPERTENSION DUE TO DRUG: ICD-10-CM

## 2024-12-09 DIAGNOSIS — C77.8 MALIGNANT NEOPLASM METASTATIC TO LYMPH NODES OF MULTIPLE SITES: ICD-10-CM

## 2024-12-09 DIAGNOSIS — Z79.899 IMMUNODEFICIENCY DUE TO DRUGS: ICD-10-CM

## 2024-12-09 DIAGNOSIS — I15.8 HYPERTENSION DUE TO DRUG: ICD-10-CM

## 2024-12-09 DIAGNOSIS — C78.00 COLON CANCER METASTASIZED TO LUNG: ICD-10-CM

## 2024-12-09 DIAGNOSIS — C19 COLORECTAL CANCER: ICD-10-CM

## 2024-12-09 DIAGNOSIS — C20 RECTAL CANCER: ICD-10-CM

## 2024-12-09 DIAGNOSIS — C77.8 MALIGNANT NEOPLASM METASTATIC TO LYMPH NODES OF MULTIPLE SITES: Primary | ICD-10-CM

## 2024-12-09 DIAGNOSIS — C78.00 COLON CANCER METASTASIZED TO LUNG: Primary | ICD-10-CM

## 2024-12-09 DIAGNOSIS — D84.821 IMMUNODEFICIENCY DUE TO DRUGS: ICD-10-CM

## 2024-12-09 DIAGNOSIS — C18.9 COLON CANCER METASTASIZED TO LUNG: ICD-10-CM

## 2024-12-09 DIAGNOSIS — C78.01 MALIGNANT NEOPLASM METASTATIC TO BOTH LUNGS: ICD-10-CM

## 2024-12-09 DIAGNOSIS — C78.7 SECONDARY MALIGNANT NEOPLASM OF LIVER: ICD-10-CM

## 2024-12-09 DIAGNOSIS — C78.02 MALIGNANT NEOPLASM METASTATIC TO BOTH LUNGS: ICD-10-CM

## 2024-12-09 DIAGNOSIS — G89.3 CANCER RELATED PAIN: ICD-10-CM

## 2024-12-09 DIAGNOSIS — C18.9 COLON CANCER METASTASIZED TO LUNG: Primary | ICD-10-CM

## 2024-12-09 LAB
ALBUMIN SERPL-MCNC: 3.6 G/DL (ref 3.5–5)
ALBUMIN/GLOB SERPL: 0.9 RATIO (ref 1.1–2)
ALP SERPL-CCNC: 288 UNIT/L (ref 40–150)
ALT SERPL-CCNC: 56 UNIT/L (ref 0–55)
ANION GAP SERPL CALC-SCNC: 8 MEQ/L
AST SERPL-CCNC: 56 UNIT/L (ref 5–34)
BASOPHILS # BLD AUTO: 0.02 X10(3)/MCL
BASOPHILS NFR BLD AUTO: 0.3 %
BILIRUB SERPL-MCNC: 0.5 MG/DL
BUN SERPL-MCNC: 10.9 MG/DL (ref 8.9–20.6)
CALCIUM SERPL-MCNC: 9.1 MG/DL (ref 8.4–10.2)
CEA SERPL-MCNC: 825.35 NG/ML (ref 0–3)
CHLORIDE SERPL-SCNC: 104 MMOL/L (ref 98–107)
CO2 SERPL-SCNC: 24 MMOL/L (ref 22–29)
CREAT SERPL-MCNC: 0.78 MG/DL (ref 0.72–1.25)
CREAT/UREA NIT SERPL: 14
EOSINOPHIL # BLD AUTO: 0.15 X10(3)/MCL (ref 0–0.9)
EOSINOPHIL NFR BLD AUTO: 2 %
ERYTHROCYTE [DISTWIDTH] IN BLOOD BY AUTOMATED COUNT: 14.1 % (ref 11.5–17)
GFR SERPLBLD CREATININE-BSD FMLA CKD-EPI: >60 ML/MIN/1.73/M2
GLOBULIN SER-MCNC: 3.8 GM/DL (ref 2.4–3.5)
GLUCOSE SERPL-MCNC: 149 MG/DL (ref 74–100)
HCT VFR BLD AUTO: 42.8 % (ref 42–52)
HGB BLD-MCNC: 14.9 G/DL (ref 14–18)
IMM GRANULOCYTES # BLD AUTO: 0.01 X10(3)/MCL (ref 0–0.04)
IMM GRANULOCYTES NFR BLD AUTO: 0.1 %
LYMPHOCYTES # BLD AUTO: 1.6 X10(3)/MCL (ref 0.6–4.6)
LYMPHOCYTES NFR BLD AUTO: 21.5 %
MCH RBC QN AUTO: 29.7 PG (ref 27–31)
MCHC RBC AUTO-ENTMCNC: 34.8 G/DL (ref 33–36)
MCV RBC AUTO: 85.3 FL (ref 80–94)
MONOCYTES # BLD AUTO: 0.45 X10(3)/MCL (ref 0.1–1.3)
MONOCYTES NFR BLD AUTO: 6.1 %
NEUTROPHILS # BLD AUTO: 5.2 X10(3)/MCL (ref 2.1–9.2)
NEUTROPHILS NFR BLD AUTO: 70 %
PLATELET # BLD AUTO: 259 X10(3)/MCL (ref 130–400)
PMV BLD AUTO: 10.2 FL (ref 7.4–10.4)
POTASSIUM SERPL-SCNC: 3.9 MMOL/L (ref 3.5–5.1)
PROT SERPL-MCNC: 7.4 GM/DL (ref 6.4–8.3)
PROT UR QL STRIP: ABNORMAL
RBC # BLD AUTO: 5.02 X10(6)/MCL (ref 4.7–6.1)
SODIUM SERPL-SCNC: 136 MMOL/L (ref 136–145)
WBC # BLD AUTO: 7.43 X10(3)/MCL (ref 4.5–11.5)

## 2024-12-09 PROCEDURE — 82378 CARCINOEMBRYONIC ANTIGEN: CPT

## 2024-12-09 PROCEDURE — A4216 STERILE WATER/SALINE, 10 ML: HCPCS | Performed by: INTERNAL MEDICINE

## 2024-12-09 PROCEDURE — 99999 PR PBB SHADOW E&M-EST. PATIENT-LVL V: CPT | Mod: PBBFAC,,, | Performed by: INTERNAL MEDICINE

## 2024-12-09 PROCEDURE — 3079F DIAST BP 80-89 MM HG: CPT | Mod: CPTII,S$GLB,, | Performed by: INTERNAL MEDICINE

## 2024-12-09 PROCEDURE — 36415 COLL VENOUS BLD VENIPUNCTURE: CPT

## 2024-12-09 PROCEDURE — 85025 COMPLETE CBC W/AUTO DIFF WBC: CPT

## 2024-12-09 PROCEDURE — 80053 COMPREHEN METABOLIC PANEL: CPT

## 2024-12-09 PROCEDURE — 4010F ACE/ARB THERAPY RXD/TAKEN: CPT | Mod: CPTII,S$GLB,, | Performed by: INTERNAL MEDICINE

## 2024-12-09 PROCEDURE — 1160F RVW MEDS BY RX/DR IN RCRD: CPT | Mod: CPTII,S$GLB,, | Performed by: INTERNAL MEDICINE

## 2024-12-09 PROCEDURE — 99215 OFFICE O/P EST HI 40 MIN: CPT | Mod: S$GLB,,, | Performed by: INTERNAL MEDICINE

## 2024-12-09 PROCEDURE — 25000003 PHARM REV CODE 250: Performed by: INTERNAL MEDICINE

## 2024-12-09 PROCEDURE — 3008F BODY MASS INDEX DOCD: CPT | Mod: CPTII,S$GLB,, | Performed by: INTERNAL MEDICINE

## 2024-12-09 PROCEDURE — 96523 IRRIG DRUG DELIVERY DEVICE: CPT

## 2024-12-09 PROCEDURE — 3074F SYST BP LT 130 MM HG: CPT | Mod: CPTII,S$GLB,, | Performed by: INTERNAL MEDICINE

## 2024-12-09 PROCEDURE — 1159F MED LIST DOCD IN RCRD: CPT | Mod: CPTII,S$GLB,, | Performed by: INTERNAL MEDICINE

## 2024-12-09 PROCEDURE — 63600175 PHARM REV CODE 636 W HCPCS: Performed by: INTERNAL MEDICINE

## 2024-12-09 PROCEDURE — 81005 URINALYSIS: CPT

## 2024-12-09 RX ORDER — HEPARIN 100 UNIT/ML
500 SYRINGE INTRAVENOUS
OUTPATIENT
Start: 2025-02-17

## 2024-12-09 RX ORDER — TRAMADOL HYDROCHLORIDE 50 MG/1
TABLET ORAL
Qty: 30 TABLET | OUTPATIENT
Start: 2024-12-09

## 2024-12-09 RX ORDER — SODIUM CHLORIDE 0.9 % (FLUSH) 0.9 %
10 SYRINGE (ML) INJECTION
OUTPATIENT
Start: 2025-02-17

## 2024-12-09 RX ORDER — HEPARIN 100 UNIT/ML
500 SYRINGE INTRAVENOUS
Status: DISCONTINUED | OUTPATIENT
Start: 2024-12-09 | End: 2024-12-09 | Stop reason: HOSPADM

## 2024-12-09 RX ORDER — SODIUM CHLORIDE 0.9 % (FLUSH) 0.9 %
10 SYRINGE (ML) INJECTION
Status: DISCONTINUED | OUTPATIENT
Start: 2024-12-09 | End: 2024-12-09 | Stop reason: HOSPADM

## 2024-12-09 RX ORDER — TRAMADOL HYDROCHLORIDE 50 MG/1
50 TABLET ORAL EVERY 6 HOURS
Qty: 30 TABLET | Refills: 0 | Status: SHIPPED | OUTPATIENT
Start: 2024-12-09

## 2024-12-09 RX ADMIN — Medication 10 ML: at 11:12

## 2024-12-09 RX ADMIN — HEPARIN 500 UNITS: 100 SYRINGE at 11:12

## 2024-12-09 NOTE — PLAN OF CARE
MPF given (Q3M); tolerated well; next appointments discussed with patient; discharged home in stable condition.

## 2024-12-18 ENCOUNTER — OFFICE VISIT (OUTPATIENT)
Dept: PALLIATIVE MEDICINE | Facility: CLINIC | Age: 44
End: 2024-12-18
Payer: COMMERCIAL

## 2024-12-18 VITALS
BODY MASS INDEX: 29.33 KG/M2 | TEMPERATURE: 98 F | HEART RATE: 190 BPM | HEIGHT: 69 IN | SYSTOLIC BLOOD PRESSURE: 122 MMHG | OXYGEN SATURATION: 99 % | WEIGHT: 198 LBS | DIASTOLIC BLOOD PRESSURE: 86 MMHG | RESPIRATION RATE: 18 BRPM

## 2024-12-18 DIAGNOSIS — F32.A ANXIETY AND DEPRESSION: ICD-10-CM

## 2024-12-18 DIAGNOSIS — G89.3 CANCER-RELATED BREAKTHROUGH PAIN: Primary | ICD-10-CM

## 2024-12-18 DIAGNOSIS — C78.00 COLON CANCER METASTASIZED TO LUNG: ICD-10-CM

## 2024-12-18 DIAGNOSIS — F41.9 ANXIETY AND DEPRESSION: ICD-10-CM

## 2024-12-18 DIAGNOSIS — C18.9 COLON CANCER METASTASIZED TO LUNG: ICD-10-CM

## 2024-12-18 DIAGNOSIS — Z71.89 COUNSELING REGARDING ADVANCE CARE PLANNING AND GOALS OF CARE: ICD-10-CM

## 2024-12-18 PROBLEM — R11.0 NAUSEA: Status: ACTIVE | Noted: 2024-12-18

## 2024-12-18 PROCEDURE — 3008F BODY MASS INDEX DOCD: CPT | Mod: CPTII,S$GLB,, | Performed by: INTERNAL MEDICINE

## 2024-12-18 PROCEDURE — 3074F SYST BP LT 130 MM HG: CPT | Mod: CPTII,S$GLB,, | Performed by: INTERNAL MEDICINE

## 2024-12-18 PROCEDURE — 3079F DIAST BP 80-89 MM HG: CPT | Mod: CPTII,S$GLB,, | Performed by: INTERNAL MEDICINE

## 2024-12-18 PROCEDURE — 1159F MED LIST DOCD IN RCRD: CPT | Mod: CPTII,S$GLB,, | Performed by: INTERNAL MEDICINE

## 2024-12-18 PROCEDURE — 4010F ACE/ARB THERAPY RXD/TAKEN: CPT | Mod: CPTII,S$GLB,, | Performed by: INTERNAL MEDICINE

## 2024-12-18 PROCEDURE — 99215 OFFICE O/P EST HI 40 MIN: CPT | Mod: S$GLB,,, | Performed by: INTERNAL MEDICINE

## 2024-12-18 PROCEDURE — 99497 ADVNCD CARE PLAN 30 MIN: CPT | Mod: S$GLB,,, | Performed by: INTERNAL MEDICINE

## 2024-12-18 PROCEDURE — 99999 PR PBB SHADOW E&M-EST. PATIENT-LVL V: CPT | Mod: PBBFAC,,, | Performed by: INTERNAL MEDICINE

## 2024-12-18 RX ORDER — LIDOCAINE 50 MG/G
1 PATCH TOPICAL DAILY
Qty: 15 PATCH | Refills: 1 | Status: SHIPPED | OUTPATIENT
Start: 2024-12-18

## 2024-12-18 RX ORDER — TRAMADOL HYDROCHLORIDE 50 MG/1
50 TABLET ORAL EVERY 6 HOURS
Qty: 30 TABLET | Refills: 0 | Status: SHIPPED | OUTPATIENT
Start: 2024-12-18

## 2024-12-18 NOTE — PROGRESS NOTES
Palliative Clinic    Patient ID: [id number]     Chief Complaint: LBP      HPI:     Owen Swanson is a 44 y.o. male here today for follow up with palliative medicine clinic.   His primary diagnosis is stage IV colorectal cancer with Mets to the lung, liver, abdominal lymph nodes.  He is currently being treated with Fruquintinb, started 8/5/24 as 21 days on/ 7 days off. HTN has been noted with the treatment. Plan is to monitor labs every 2 weeks. He was seen by Dr. Bhatti with [facility] on 9/10/24. They are awaiting restaging scans to evaluate for progression of disease. If progression, they discussed 3 options for clinical trial drugs.      He was seen on 10/11/24 by Dr. Renee Gipson with review of CT imaging from 10/9/24. Areas of endobronchial soft tissue in the right upper lobe have similar overall size, solid nodule in the right lower lobe measures 17 mm, previously 16 mm. The left upper lobe nodule measures 13 mm, previously 10 mm. No definitive new suspicious nodule. Stable 11 mm right lower paratracheal lymph node. Liver lesions and periportal lymph nodes have similar sizes since July, although with mild decrease in overall attenuation.  Suspect this relates to post treatment effect. Slightly improved rectal wall thickening.  The splenic hypodensity is smaller. Current treatment plan is Fruquintinib 5 mg daily x 21 days on/7 days off started on 8/5/24. Cycle 4 to start 10/28/24. He had C5 on 11/25/24. Recent CEA was down. Patient seen by [facility] with plan for clinical trial options if progression of disease on next scans.    Patient was last seen by me on 11/20/24    Advance Care Planning     Date: 12/18/2024    Living Will  During this visit, I engaged the patient  in the voluntary advance care planning process.  The patient and I reviewed the role for advance directives and their purpose in directing future healthcare if the patient's unable to speak for him/herself.  At this point in time, the patient does have  full decision-making capacity.  We discussed different extreme health states that he could experience, and reviewed what kind of medical care he would want in those situations.  The patient communicated that if he were comatose and had little chance of a meaningful recovery, he would not want machines/life-sustaining treatments used. In addition to the above preference, other important end-of-life issues for the patient include  would elect continued enteral nutrition and hydration . The patient has completed a living will to reflect these preferences and The patient has already designated a healthcare power of  to make decisions on his behalf.  I spent a total of 15 minutes engaging the patient in this advance care planning discussion.          Power of   I initiated the process of voluntary advance care planning today and explained the importance of this process to the patient.  I introduced the concept of advance directives to the patient, as well. Then the patient received detailed information about the importance of designating a Health Care Power of  (HCPOA). He was also instructed to communicate with this person about their wishes for future healthcare, should he become sick and lose decision-making capacity. The patient has previously appointed a HCPOA. After our discussion, the patient has decided to complete a HCPOA and has appointed his  spouse , health care agent:  Marie Swanson  & health care agent number:  [phone] . I encouraged him to communicate with this person about their wishes for future healthcare, should he become sick and lose decision-making capacity.      A total of 15 min was spent on advance care planning, goals of care discussion, emotional support, formulating and communicating prognosis and exploring burden/benefit of various approaches of treatment. This discussion occurred on a fully voluntary basis with the verbal consent of the patient and/or family.          Symptom review:    Pain. Tramadol (30 pills last him about 20 days per ) and gabapentin (rarely takes). Pain is 3/10, comes and goes, located across lower back, worse when he sits or lies too long, better with movement, also worse after work. Pain is relieved to tolerable level with tramadol, sometimes with addition of 2 ES tylenol. He avoids taking gabapentin at times due to somnolence. Lidoderm patches are beneficial for LBP    Constipation. He denies complaints    Anxiety & Depression-xanax 0.25 mg BID prn (60 pills lasts him 5-8 weeks per ) He admits to having increased anxiety, poor energy or willingness to do anything. He feels less able to tolerate usual stressors. He still enjoys his music and art. He admits to crying more frequently than usual. Xanax helps him sleep but increases episodes of emotionality. He says that he feels better when he does no take it.  Patient does not wish for any refills on Cymbalta (was not helpful and made him feel like a zombie).    Nausea-phenergan prn. No current complaints    Assessment/Plan:       1. Cancer-related breakthrough pain  Refill Tramadol prn, Refill lidocaine patches.     2. Counseling regarding advance care planning and goals of care  I provided compassionate listening and [facility]. Patient is willing.    3. Anxiety and depression  Encouraged the use of OTC melatonin as directed for insomnia.  Xanax available as a back up.    4. Colon cancer metastasized to lung    -     traMADoL (ULTRAM) 50 mg tablet; Take 1 tablet (50 mg total) by mouth every 6 (six) hours.  Dispense: 30 tablet; Refill: 0    Other orders  -     LIDOcaine (LIDODERM) 5 %; Place 1 patch onto the skin once daily. Remove & Discard patch within 12 hours or as directed by MD  Dispense: 15 patch; Refill: 1                 History:     Past Medical History:   Diagnosis Date    Anxiety     Hypertension         History reviewed. No pertinent surgical history.      Social History     Tobacco Use    Smoking status: Former     Current packs/day: 0.00     Average packs/day: 0.5 packs/day for 10.0 years (5.0 ttl pk-yrs)     Types: Cigarettes     Quit date: 2016     Years since quittin.9    Smokeless tobacco: Never   Substance and Sexual Activity    Alcohol use: Never    Drug use: Yes     Types: Marijuana    Sexual activity: Yes     Partners: Female     Birth control/protection: None        Current Outpatient Medications   Medication Instructions    albuterol (PROVENTIL) 2.5 mg, Every 6 hours PRN    ALPRAZolam (XANAX) 0.25 mg, Oral, 2 times daily PRN    amLODIPine-benazepriL (LOTREL) 10-40 mg per capsule 1 capsule, Oral, Daily    ascorbic acid, vitamin C, (VITAMIN C) 1000 MG tablet 4 tablets, Nightly    cholecalciferol, vitamin D3, (VITAMIN D3) 25 mcg (1,000 unit) capsule 1 capsule, Nightly    CMPD hydrocortisone 2%- LIDOcaine 3% suppository (RECTAL ROCKET) 1 suppository, Rectal, Every 6 hours PRN, Insert 1 suppository 3/4 of the way onto rectum. Wet for easier application. Repeat for 3 nights.    cyanocobalamin (VITAMIN B-12) 100 MCG tablet 1 tablet, Nightly    DULoxetine (CYMBALTA) 30 mg, Oral, Daily    fruquintinib 5 mg Cap Take 1 capsule (5 mg) by mouth once daily for 21 days, then off for 7 days    gabapentin (NEURONTIN) 300 mg, Nightly    LIDOcaine (LIDODERM) 5 % 1 patch, Transdermal, Daily, Remove & Discard patch within 12 hours or as directed by MD    magnesium 200 mg Tab 2 tablets, Nightly    metoprolol tartrate (LOPRESSOR) 25 mg, Oral, 2 times daily    NON FORMULARY MEDICATION 2 tablets, Daily    NON FORMULARY MEDICATION 1 Piece, Daily PRN    NON FORMULARY MEDICATION Quoc Gage oil (THC oil)    omeprazole (PRILOSEC) 20 MG capsule 1 capsule, Every morning    ondansetron (ZOFRAN) 4 mg, Every 8 hours PRN    promethazine (PHENERGAN) 25 mg, Oral, Every 6 hours PRN    traMADoL (ULTRAM) 50 mg, Oral, Every 6 hours    zinc acetate 50 mg (zinc) Cap 1 tablet, Nightly  "      Review of patient's allergies indicates:  No Known Allergies     Patient Care Team:  Quinn Ledesma MD as PCP - General (Family Medicine)  Lamar Mc PharmD as Pharmacist (Pharmacist)     Subjective:     Review of Systems   All other systems reviewed and are negative.      12 point review of systems conducted, negative except as stated in the history of present illness. See HPI for details.    Objective:     Visit Vitals  /86 (BP Location: Left arm, Patient Position: Sitting)   Pulse (!) 190   Temp 97.9 °F (36.6 °C) (Oral)   Resp 18   Ht 5' 9" (1.753 m)   Wt 89.8 kg (198 lb)   SpO2 99%   BMI 29.24 kg/m²      Vitals:    12/18/24 1043   PainSc: 2  Comment: 3/10   PainLoc: Back  Comment: lower back       Physical Exam  Vitals reviewed.   Constitutional:       Appearance: He is normal weight. He is not ill-appearing or toxic-appearing.   HENT:      Head: Normocephalic.      Right Ear: External ear normal.      Left Ear: External ear normal.      Nose: Nose normal.      Mouth/Throat:      Mouth: Mucous membranes are moist.      Pharynx: Oropharynx is clear.   Eyes:      Extraocular Movements: Extraocular movements intact.      Conjunctiva/sclera: Conjunctivae normal.      Pupils: Pupils are equal, round, and reactive to light.   Cardiovascular:      Rate and Rhythm: Normal rate and regular rhythm.      Pulses: Normal pulses.      Heart sounds: Normal heart sounds.   Pulmonary:      Effort: Pulmonary effort is normal.      Breath sounds: Normal breath sounds.   Abdominal:      General: Abdomen is flat. Bowel sounds are normal. There is no distension.      Palpations: Abdomen is soft.      Tenderness: There is no abdominal tenderness.   Musculoskeletal:         General: No tenderness. Normal range of motion.   Skin:     General: Skin is warm.   Neurological:      General: No focal deficit present.      Mental Status: He is alert and oriented to person, place, and time.   Psychiatric:         Mood and " Affect: Mood normal.         Behavior: Behavior normal.         Thought Content: Thought content normal.         Judgment: Judgment normal.         Review of Symptoms      Symptom Assessment (ESAS 0-10 Scale)  Pain:  0  Dyspnea:  0  Anxiety:  0  Nausea:  0  Depression:  0  Anorexia:  0  Fatigue:  0  Insomnia:  0  Restlessness:  0  Agitation:  0         Performance Status:  60    Living Arrangements:  Lives with spouse and Lives in home    Psychosocial/Cultural:   See Palliative Psychosocial Note: Yes  Recommended / counseling.  **Primary  to Follow**  Palliative Care  Consult: No        Labs Reviewed:     Chemistry:  Lab Results   Component Value Date     12/09/2024    K 3.9 12/09/2024    BUN 10.9 12/09/2024    CREATININE 0.78 12/09/2024    EGFRNORACEVR >60 12/09/2024    GLUCOSE 149 (H) 12/09/2024    CALCIUM 9.1 12/09/2024    ALKPHOS 288 (H) 12/09/2024    LABPROT 7.4 12/09/2024    ALBUMIN 3.6 12/09/2024    AST 56 (H) 12/09/2024    ALT 56 (H) 12/09/2024        Hematology:  Lab Results   Component Value Date    WBC 7.43 12/09/2024    HGB 14.9 12/09/2024    HCT 42.8 12/09/2024     12/09/2024       Urine:  Lab Results   Component Value Date    APPEARANCEUA Clear 05/28/2024    SGUA 1.027 05/28/2024    PROTEINUA 2+ (A) 12/09/2024    KETONESUA Trace (A) 05/28/2024    LEUKOCYTESUR Negative 05/28/2024    RBCUA 0-5 05/28/2024    WBCUA 0-5 05/28/2024    BACTERIA None Seen 05/28/2024    SQEPUA Trace 05/28/2024    PROTEINURINE 48.5 11/13/2024            I have reviewed the narcotic prescription data via .    Follow up in about 2 months (around 2/18/2025) for Follow Up, With MD. In addition to their scheduled follow up, the patient has also been instructed to follow up on as needed basis.     Future Appointments   Date Time Provider Department Center   1/9/2025  9:30 AM [facility]   1/9/2025 10:00 AM [facility]   2/19/2025  10:30 AM Porfirio Owen MD [facility] OP PAL [facility]   3/7/2025  8:00 AM INJECTION CHAIR 03, [facility] CHEMOTHERAPY [facility] CHEMO [facility]        Porfirio Owen MD    > 50% of 40 min of encounter was spent in chart review, face to face discussion of goals of care, symptom assessment, coordination of care and emotional support.     An additional 40 min of time was spent in Advanced Care Planning discussion.

## 2025-01-05 PROBLEM — C78.00 COLON CANCER METASTASIZED TO LUNG: Status: RESOLVED | Noted: 2024-05-09 | Resolved: 2025-01-05

## 2025-01-05 PROBLEM — C18.9 COLON CANCER METASTASIZED TO LUNG: Status: RESOLVED | Noted: 2024-05-09 | Resolved: 2025-01-05

## 2025-01-05 NOTE — PROGRESS NOTES
Subjective:       Patient ID: Owen Swanson is a 44 y.o. male.    Rectal Cancer Stage IV--Diagnosed 23  Biopsy/pathology:   1. Colonoscopy 23--large fungating rectal mass 5-10cm from anal verge to approximately 20cm, biopsy with well differentiated colorectal adenocarcinoma.   2. Bronchoscopy with biopsy of endobronchial mass RUL biopsy done 3/16/23--metastatic poorly differentiated colonic adenocarcinoma. Foundation One with PD-L1 0%, ALK rearrangement, KRAS G12V, AKT2 amplification, APCV, FBXW7, CCND3 amplification, TP53, VEGFA amplification, TMB 8, MSI-high not detected, elevated tumor fraction.   3. Liquid Biopsy Guardant 360 done 24--KRAS G12V, APC , TP53 R248W, MSI high not detected, NRAS, BRAF, Her2 and NTRK negative.  Guardant 360 liquid biopsy done 24--KRAS G12V, APC and TP53 mutations.   Imagin. PET/CT 24--interval enlargement and increased metabolic activity of primary rectal mass, c/w local disease progression, stable hypermetabolic hepatic metastatic disease, grossly stable infiltrating tumor in RUL bronchus, suspected subtle interval enlargement of subcentimeter nodule in TIGRE and RLL.   2. CT C/A/P w/ contrast done at [facility] 3/18/24--Similar to slight increase in size of left upper and right lower lobe pulmonary nodules. Grossly similar right upper lobe peribronchial lesion and nodularity, similar to slight increase in size of conglomerate maria isabel hepatic and peripancreatic lymphadenopathy, grossly similar metastatic liver lesions.   3. PET/CT 24--Findings concerning for progression of primary rectal mass and metastatic disease.   4. CT A/P done 24--long segment rectal wall thickening and nodularity compatible with known rectal malignancy, multiple calcified abdominopelvic lymph nodes compatible with metastatic nodes. Reference portal caval lymph node measures 2.4 cm short axis, Multiple bilobar hypodense, variably calcified hepatic masses compatible with  colorectal metastases. Largest discrete lesion in the right lobe measures approximately 8.7 cm, findings similar to prior.   5. CT C/A/P done 7/24/24--progressive pulmonary metastatic disease, bulky branching endobronchial tumor anterior RUL, appears enlarged but difficult to measure, 4.5cm (prior 2.5cm), nodule at TIGRE measures 10mm (prior 7mm), nodule RLL measures 16mm (prior 12mm), progressive hepatic metastatic disease, few lesions enlarged two in hepatic segment VI measure 2.6 and 4.4cm (prior 1.9 and 4cm), majority appear stable, large pathologic lymph nodes in maria isabel hepatis have progressed, for example LN anterior to celiac artery measures 2.8cm vs. Prior 2.3cm, new 2cm splenic lesion, potentially metastatic, large rectal mass 8cm, similar, invading the mesorectal fat, grossly stable partially calcified metastatic lymph nodes in mesorectal fat and retroperitoneum.   6. CT C/A/P done 10/9/24--Areas of endobronchial soft tissue in the right upper lobe have similar overall size, solid nodule in the right lower lobe measures 17 mm, previously 16 mm. The left upper lobe nodule measures 13 mm, previously 10 mm. No definitive new suspicious nodule. Stable 11 mm right lower paratracheal lymph node. Liver lesions and periportal lymph nodes have similar sizes since July, although with mild decrease in overall attenuation.  Suspect this relates to post treatment effect. Slightly improved rectal wall thickening.  The splenic hypodensity is smaller.    Treatment history:  FOLFOX/Avastin 2/22/23 followed by maintenance.  Progression in 11/2023.   FOLFIRI/Avastin 11/2023--4/29/24 (stopped due to progression).  Alectinib 5/22/24--7/26/24 (stopped due to progression).    CEA:   05/09/24--904  06/12/24--1,131.53  07/01/24--1,191.49  07/16/24--1,496.72  08/19/24--1,073.40  09/05/24--1,349.70  09/19/24--907.29  10/01/24--1,424.78  10/17/24--914.90  11/14/24--922.8  12/09/24--825.35    Current treatment plan:   Fruquintinib 5mg  daily X 21 days on/7 days off started on 24.   Cycle 6 started on 24.     Chief Complaint: Follow-up    HPI  Patient presents for follow-up of Stage IV rectal cancer. He is doing okay. Reports having some ongoing fatigue, rectal pressure, pain, controlled on Gabapentin and Tramadol. BP is high today but he reports that it is normal at home. We discussed him bringing in readings next visit. He is till following with palliative care. Appetite is up and down, weight is stable. He did not reschedule his scans after last visit, as he thought we would do that.     Past Medical History:   Diagnosis Date    Anxiety     Hypertension       History reviewed. No pertinent surgical history.  Family History   Problem Relation Name Age of Onset    Hypertension Mother      Coronary artery disease Father Bora Swanson Jr.     Hypertension Father Bora Swanson Jr.     Diabetes Father Bora Swanson Jr.     Cancer Father Bora Swanson Jr.      Social History     Socioeconomic History    Marital status:    Tobacco Use    Smoking status: Former     Current packs/day: 0.00     Average packs/day: 0.5 packs/day for 10.0 years (5.0 ttl pk-yrs)     Types: Cigarettes     Quit date: 2016     Years since quittin.0    Smokeless tobacco: Never   Substance and Sexual Activity    Alcohol use: Never    Drug use: Yes     Types: Marijuana    Sexual activity: Yes     Partners: Female     Birth control/protection: None       Review of patient's allergies indicates:  No Known Allergies   Current Outpatient Medications on File Prior to Visit   Medication Sig Dispense Refill    albuterol (PROVENTIL) 2.5 mg /3 mL (0.083 %) nebulizer solution Inhale 2.5 mg into the lungs every 6 (six) hours as needed.      ALPRAZolam (XANAX) 0.25 MG tablet Take 1 tablet (0.25 mg total) by mouth 2 (two) times daily as needed for Anxiety. 60 tablet 0    ascorbic acid, vitamin C, (VITAMIN C) 1000 MG tablet Take 4 tablets by mouth every evening.       cholecalciferol, vitamin D3, (VITAMIN D3) 25 mcg (1,000 unit) capsule Take 1 capsule by mouth every evening.      CMPD hydrocortisone 2%- LIDOcaine 3% suppository (RECTAL ROCKET) Place 1 suppository rectally every 6 (six) hours as needed (anorectal pain). Insert 1 suppository 3/4 of the way onto rectum. Wet for easier application. Repeat for 3 nights. 12 applicator 1    cyanocobalamin (VITAMIN B-12) 100 MCG tablet Take 1 tablet by mouth every evening.      DULoxetine (CYMBALTA) 30 MG capsule Take 1 capsule (30 mg total) by mouth once daily. (Patient taking differently: Take 30 mg by mouth 2 (two) times daily as needed.) 30 capsule 1    fruquintinib 5 mg Cap Take 1 capsule (5 mg) by mouth once daily for 21 days, then off for 7 days 21 capsule 6    gabapentin (NEURONTIN) 300 MG capsule Take 300 mg by mouth every evening.      LIDOcaine (LIDODERM) 5 % Place 1 patch onto the skin once daily. Remove & Discard patch within 12 hours or as directed by MD 15 patch 1    magnesium 200 mg Tab Take 2 tablets by mouth every evening.      metoprolol tartrate (LOPRESSOR) 25 MG tablet Take 1 tablet (25 mg total) by mouth 2 (two) times daily. 60 tablet 3    NON FORMULARY MEDICATION Take 2 tablets by mouth Daily. Iron blood builder      NON FORMULARY MEDICATION Take 1 Piece by mouth daily as needed (pain). THC gummies. Patient stated 1 piece or less per day.      NON FORMULARY MEDICATION Quoc Gage oil (THC oil)      omeprazole (PRILOSEC) 20 MG capsule Take 1 capsule by mouth every morning.      ondansetron (ZOFRAN) 4 MG tablet Take 4 mg by mouth every 8 (eight) hours as needed.      promethazine (PHENERGAN) 25 MG tablet Take 1 tablet (25 mg total) by mouth every 6 (six) hours as needed for Nausea. 34 tablet 2    traMADoL (ULTRAM) 50 mg tablet Take 1 tablet (50 mg total) by mouth every 6 (six) hours. 30 tablet 0    zinc acetate 50 mg (zinc) Cap Take 1 tablet by mouth every evening.      amLODIPine-benazepriL (LOTREL) 10-40 mg per  "capsule Take 1 capsule by mouth once daily. 30 capsule 3     No current facility-administered medications on file prior to visit.      Review of Systems   Constitutional:  Positive for appetite change and fatigue. Unexpected weight change: weight fluctuates.  HENT:  Negative for mouth sores.    Eyes:  Negative for visual disturbance.   Respiratory:  Negative for shortness of breath.    Cardiovascular:  Negative for chest pain.   Gastrointestinal:  Positive for abdominal pain and constipation. Negative for diarrhea.   Musculoskeletal:  Positive for back pain.   Neurological:  Negative for headaches.   Hematological:  Negative for adenopathy.   Psychiatric/Behavioral:  The patient is nervous/anxious.          Vitals:    01/09/25 0958   BP: (!) 147/104   Pulse: 73   Resp: 18   Temp: 97.7 °F (36.5 °C)   TempSrc: Oral   SpO2: 99%   Weight: 90.4 kg (199 lb 4.8 oz)   Height: 5' 9" (1.753 m)       Wt Readings from Last 3 Encounters:   01/09/25 0958 90.4 kg (199 lb 4.8 oz)   12/18/24 1043 89.8 kg (198 lb)   12/09/24 1105 89.6 kg (197 lb 9.6 oz)     Physical Exam  Constitutional:       General: He is awake.      Appearance: Normal appearance. He is normal weight.   HENT:      Head: Normocephalic and atraumatic.      Nose: Nose normal.      Mouth/Throat:      Mouth: Mucous membranes are moist.   Eyes:      General: Vision grossly intact.      Extraocular Movements: Extraocular movements intact.      Conjunctiva/sclera: Conjunctivae normal.   Cardiovascular:      Rate and Rhythm: Normal rate and regular rhythm.      Heart sounds: Normal heart sounds.   Pulmonary:      Effort: Pulmonary effort is normal.      Breath sounds: Normal breath sounds.   Chest:      Comments: Left chest wall mediport in place  Abdominal:      General: Abdomen is protuberant. Bowel sounds are normal. There is no distension.      Palpations: Abdomen is soft.      Tenderness: There is no abdominal tenderness.   Musculoskeletal:      Cervical back: Normal " range of motion and neck supple.      Right lower leg: No edema.      Left lower leg: No edema.   Lymphadenopathy:      Cervical: No cervical adenopathy.      Upper Body:      Right upper body: No supraclavicular adenopathy.      Left upper body: No supraclavicular adenopathy.   Skin:     General: Skin is warm.   Neurological:      Mental Status: He is alert and oriented to person, place, and time.      Motor: Motor function is intact.   Psychiatric:         Mood and Affect: Mood normal.         Speech: Speech normal.         Behavior: Behavior is cooperative.         Judgment: Judgment normal.       Lab Visit on 01/09/2025   Component Date Value Ref Range Status    WBC 01/09/2025 7.16  4.50 - 11.50 x10(3)/mcL Final    RBC 01/09/2025 4.81  4.70 - 6.10 x10(6)/mcL Final    Hgb 01/09/2025 14.7  14.0 - 18.0 g/dL Final    Hct 01/09/2025 43.1  42.0 - 52.0 % Final    MCV 01/09/2025 89.6  80.0 - 94.0 fL Final    MCH 01/09/2025 30.6  27.0 - 31.0 pg Final    MCHC 01/09/2025 34.1  33.0 - 36.0 g/dL Final    RDW 01/09/2025 13.7  11.5 - 17.0 % Final    Platelet 01/09/2025 217  130 - 400 x10(3)/mcL Final    MPV 01/09/2025 10.3  7.4 - 10.4 fL Final    Neut % 01/09/2025 74.0  % Final    Lymph % 01/09/2025 17.5  % Final    Mono % 01/09/2025 5.2  % Final    Eos % 01/09/2025 2.8  % Final    Basophil % 01/09/2025 0.4  % Final    Imm Grans % 01/09/2025 0.1  % Final    Neut # 01/09/2025 5.30  2.1 - 9.2 x10(3)/mcL Final    Lymph # 01/09/2025 1.25  0.6 - 4.6 x10(3)/mcL Final    Mono # 01/09/2025 0.37  0.1 - 1.3 x10(3)/mcL Final    Eos # 01/09/2025 0.20  0 - 0.9 x10(3)/mcL Final    Baso # 01/09/2025 0.03  <=0.2 x10(3)/mcL Final    Imm Gran # 01/09/2025 0.01  0.00 - 0.04 x10(3)/mcL Final         Assessment:       1. Rectal cancer    2. Malignant neoplasm metastatic to both lungs    3. 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 Secondary malignant neoplasm of liver      Plan:       Patient with Stage IV rectal cancer,  lung, liver, abdominal mariya metastases diagnosed in 2/2023. KRAS mutated.  Patient has failed FOLFOX avastin and FOLFIRI avastin.  Most Recent PET from 4/29/24 shows disease progression and CEA rising.    In review of previous Foundation One testing, noted to have an ALK rearrangement.   I did a literature review and there have been documented responses in colon cancers treatment with ALK inhibitors.   Case presented at [facility] clinical trials tumor board. No current clinical trials available, but treatment recommended with Alectinib.   Case presented at molecular tumor board on 6/4/24, agreed with trial of Alectinib.    Discussed conventional treatment with 3rd line Stivarga vs. Lonsurf which have not historically had good outcomes. Also discussed incurable nature of disease and continued palliative goals of treatment.  Would favor a more aggressive approach to treatment given his excellent functional status and young age. Did not think Keytruda or immunotherapy would be effective given JESUS.     Patient started Alectinib 600mg PO BID on 5/22/24.  CT done 7/24/24 showed some stable disease, but mostly progression.  Recommend to change treatment to Fruquintinib per [facility].   Received 2 units PRBC on 7/29/24 for worsening anemia.    Patient started Fruquintinib 5mg daily X 21 days on/7 days off on 8/5/24.   Patient continues tolerating well.     CT C/A/P done 10/9/24 after 3 cycles showed mixed findings, improvement in liver and a few lung nodules slightly increased.     CBC today is good, CMP pending. Last CEA was decreased.     Patient missed his scan in 12/2024, and did not reschedule. Will reschedule ASAP.       Recommend to continue with same treatment. He is tolerating well and feeling good.   Patient started C6 on 12/23/24 and tolerating well.     F/u in 2 weeks or after scan is completed.     Remains on Lotrel 10/40 daily, added Metoprolol 25mg PO BID.  BP high today, instructed patient to check at home and  bring in readings.    Baseline urine protein done 7/26/24. 24-hour urine okay with 449mg of protein.   Urine again with 3+ protein. 24 hour repeated in 11/2024 <2g so we are good.     No need to keep checking urine protein once a month. May repeat in 3-4 months.     Patient will continue follow-up with palliative care for pain management, insomnia and anxiety management.   Continue supplements with Miles Perret--Boost Breeze or Ensure Clear supplements.   Patient has no FH of any cancer, may need genetic testing due to the APC mutation.    Seen at [facility] for clinical trial options and if progressive disease on next scans, they identified the following trial options for him:  1. 2022-0276, [facility]236, [place]: Chantal  2. 2020-1340 RP-[id number] (PKMYT1 kinase inhibitor), [place]: Edgar Patricia  3. 2024-0004: APR-1051 (Wee1 inhibitor). [place]: Laltiha Christianson  4. [facility] trial with Dr. Viki Frank     All questions answered at this time.      Renee Gipson MD

## 2025-01-09 ENCOUNTER — OFFICE VISIT (OUTPATIENT)
Dept: HEMATOLOGY/ONCOLOGY | Facility: CLINIC | Age: 45
End: 2025-01-09
Payer: COMMERCIAL

## 2025-01-09 ENCOUNTER — LAB VISIT (OUTPATIENT)
Dept: LAB | Facility: HOSPITAL | Age: 45
End: 2025-01-09
Attending: INTERNAL MEDICINE
Payer: COMMERCIAL

## 2025-01-09 VITALS
RESPIRATION RATE: 18 BRPM | TEMPERATURE: 98 F | BODY MASS INDEX: 29.52 KG/M2 | OXYGEN SATURATION: 99 % | WEIGHT: 199.31 LBS | SYSTOLIC BLOOD PRESSURE: 147 MMHG | HEART RATE: 73 BPM | DIASTOLIC BLOOD PRESSURE: 104 MMHG | HEIGHT: 69 IN

## 2025-01-09 DIAGNOSIS — C20 RECTAL CANCER: ICD-10-CM

## 2025-01-09 DIAGNOSIS — C77.8 MALIGNANT NEOPLASM METASTATIC TO LYMPH NODES OF MULTIPLE SITES: ICD-10-CM

## 2025-01-09 DIAGNOSIS — C78.7 SECONDARY MALIGNANT NEOPLASM OF LIVER: ICD-10-CM

## 2025-01-09 DIAGNOSIS — C78.02 MALIGNANT NEOPLASM METASTATIC TO BOTH LUNGS: ICD-10-CM

## 2025-01-09 DIAGNOSIS — C78.01 MALIGNANT NEOPLASM METASTATIC TO BOTH LUNGS: ICD-10-CM

## 2025-01-09 DIAGNOSIS — C20 RECTAL CANCER: Primary | ICD-10-CM

## 2025-01-09 LAB
ALBUMIN SERPL-MCNC: 3.6 G/DL (ref 3.5–5)
ALBUMIN/GLOB SERPL: 0.9 RATIO (ref 1.1–2)
ALP SERPL-CCNC: 319 UNIT/L (ref 40–150)
ALT SERPL-CCNC: 65 UNIT/L (ref 0–55)
ANION GAP SERPL CALC-SCNC: 8 MEQ/L
AST SERPL-CCNC: 69 UNIT/L (ref 5–34)
BASOPHILS # BLD AUTO: 0.03 X10(3)/MCL
BASOPHILS NFR BLD AUTO: 0.4 %
BILIRUB SERPL-MCNC: 0.4 MG/DL
BUN SERPL-MCNC: 8.4 MG/DL (ref 8.9–20.6)
CALCIUM SERPL-MCNC: 9.1 MG/DL (ref 8.4–10.2)
CEA SERPL-MCNC: 786.91 NG/ML (ref 0–3)
CHLORIDE SERPL-SCNC: 103 MMOL/L (ref 98–107)
CO2 SERPL-SCNC: 25 MMOL/L (ref 22–29)
CREAT SERPL-MCNC: 0.81 MG/DL (ref 0.72–1.25)
CREAT/UREA NIT SERPL: 10
EOSINOPHIL # BLD AUTO: 0.2 X10(3)/MCL (ref 0–0.9)
EOSINOPHIL NFR BLD AUTO: 2.8 %
ERYTHROCYTE [DISTWIDTH] IN BLOOD BY AUTOMATED COUNT: 13.7 % (ref 11.5–17)
GFR SERPLBLD CREATININE-BSD FMLA CKD-EPI: >60 ML/MIN/1.73/M2
GLOBULIN SER-MCNC: 3.9 GM/DL (ref 2.4–3.5)
GLUCOSE SERPL-MCNC: 174 MG/DL (ref 74–100)
HCT VFR BLD AUTO: 43.1 % (ref 42–52)
HGB BLD-MCNC: 14.7 G/DL (ref 14–18)
IMM GRANULOCYTES # BLD AUTO: 0.01 X10(3)/MCL (ref 0–0.04)
IMM GRANULOCYTES NFR BLD AUTO: 0.1 %
LYMPHOCYTES # BLD AUTO: 1.25 X10(3)/MCL (ref 0.6–4.6)
LYMPHOCYTES NFR BLD AUTO: 17.5 %
MCH RBC QN AUTO: 30.6 PG (ref 27–31)
MCHC RBC AUTO-ENTMCNC: 34.1 G/DL (ref 33–36)
MCV RBC AUTO: 89.6 FL (ref 80–94)
MONOCYTES # BLD AUTO: 0.37 X10(3)/MCL (ref 0.1–1.3)
MONOCYTES NFR BLD AUTO: 5.2 %
NEUTROPHILS # BLD AUTO: 5.3 X10(3)/MCL (ref 2.1–9.2)
NEUTROPHILS NFR BLD AUTO: 74 %
PLATELET # BLD AUTO: 217 X10(3)/MCL (ref 130–400)
PMV BLD AUTO: 10.3 FL (ref 7.4–10.4)
POTASSIUM SERPL-SCNC: 3.6 MMOL/L (ref 3.5–5.1)
PROT SERPL-MCNC: 7.5 GM/DL (ref 6.4–8.3)
RBC # BLD AUTO: 4.81 X10(6)/MCL (ref 4.7–6.1)
SODIUM SERPL-SCNC: 136 MMOL/L (ref 136–145)
WBC # BLD AUTO: 7.16 X10(3)/MCL (ref 4.5–11.5)

## 2025-01-09 PROCEDURE — 82378 CARCINOEMBRYONIC ANTIGEN: CPT

## 2025-01-09 PROCEDURE — 36415 COLL VENOUS BLD VENIPUNCTURE: CPT

## 2025-01-09 PROCEDURE — 1159F MED LIST DOCD IN RCRD: CPT | Mod: CPTII,S$GLB,, | Performed by: INTERNAL MEDICINE

## 2025-01-09 PROCEDURE — 3080F DIAST BP >= 90 MM HG: CPT | Mod: CPTII,S$GLB,, | Performed by: INTERNAL MEDICINE

## 2025-01-09 PROCEDURE — 1160F RVW MEDS BY RX/DR IN RCRD: CPT | Mod: CPTII,S$GLB,, | Performed by: INTERNAL MEDICINE

## 2025-01-09 PROCEDURE — 99999 PR PBB SHADOW E&M-EST. PATIENT-LVL V: CPT | Mod: PBBFAC,,, | Performed by: INTERNAL MEDICINE

## 2025-01-09 PROCEDURE — 99215 OFFICE O/P EST HI 40 MIN: CPT | Mod: S$GLB,,, | Performed by: INTERNAL MEDICINE

## 2025-01-09 PROCEDURE — 80053 COMPREHEN METABOLIC PANEL: CPT

## 2025-01-09 PROCEDURE — 3008F BODY MASS INDEX DOCD: CPT | Mod: CPTII,S$GLB,, | Performed by: INTERNAL MEDICINE

## 2025-01-09 PROCEDURE — 85025 COMPLETE CBC W/AUTO DIFF WBC: CPT

## 2025-01-09 PROCEDURE — 3077F SYST BP >= 140 MM HG: CPT | Mod: CPTII,S$GLB,, | Performed by: INTERNAL MEDICINE

## 2025-01-16 ENCOUNTER — TELEPHONE (OUTPATIENT)
Dept: PALLIATIVE MEDICINE | Facility: CLINIC | Age: 45
End: 2025-01-16
Payer: COMMERCIAL

## 2025-01-16 DIAGNOSIS — C18.9 COLON CANCER METASTASIZED TO LUNG: ICD-10-CM

## 2025-01-16 DIAGNOSIS — C78.00 COLON CANCER METASTASIZED TO LUNG: ICD-10-CM

## 2025-01-16 NOTE — TELEPHONE ENCOUNTER
Medication needing refill- Tramadol 50 mg   Last refill written- 12/18/2024  Previous appointment- 12/18/2024  Upcoming appointment- 02/19/2025  How is the prescription is written?Take 1 tablet by mouth every 6 hours  How many dose(s) per day is patient taking 1 tablet per day   Is the medication effective- yes   -If not, why?  Any remaining refills- none  Quantity remaining- 0 tablets remaining   Pharmacy- [facility]     Pain assessment-  Pain rating before taking PRN pain medication- 7/10  Pain rating after taking PRN pain medication- 1-2/10  Type- Throbbing   Radiating- lower back and abdomen   Alleviating factors- (pharmacologic and non-pharmacologic)- medication and rest   Aggravating factors- Activity

## 2025-01-17 RX ORDER — TRAMADOL HYDROCHLORIDE 50 MG/1
50 TABLET ORAL EVERY 6 HOURS
Qty: 30 TABLET | Refills: 0 | Status: SHIPPED | OUTPATIENT
Start: 2025-01-17

## 2025-01-17 NOTE — TELEPHONE ENCOUNTER
I called patient and informed patient of medication refill and pharmacy. Patient verbalized understanding and expressed gratitude.

## 2025-01-17 NOTE — PROGRESS NOTES
Subjective:       Patient ID: Owen Swanson is a 44 y.o. male.    Rectal Cancer Stage IV--Diagnosed 23  Biopsy/pathology:   1. Colonoscopy 23--large fungating rectal mass 5-10cm from anal verge to approximately 20cm, biopsy with well differentiated colorectal adenocarcinoma.   2. Bronchoscopy with biopsy of endobronchial mass RUL biopsy done 3/16/23--metastatic poorly differentiated colonic adenocarcinoma. Foundation One with PD-L1 0%, ALK rearrangement, KRAS G12V, AKT2 amplification, APCV, FBXW7, CCND3 amplification, TP53, VEGFA amplification, TMB 8, MSI-high not detected, elevated tumor fraction.   3. Liquid Biopsy Guardant 360 done 24--KRAS G12V, APC , TP53 R248W, MSI high not detected, NRAS, BRAF, Her2 and NTRK negative.  Guardant 360 liquid biopsy done 24--KRAS G12V, APC and TP53 mutations.   Imagin. PET/CT 24--interval enlargement and increased metabolic activity of primary rectal mass, c/w local disease progression, stable hypermetabolic hepatic metastatic disease, grossly stable infiltrating tumor in RUL bronchus, suspected subtle interval enlargement of subcentimeter nodule in TIGRE and RLL.   2. CT C/A/P w/ contrast done [facility] 3/18/24--Similar to slight increase in size of left upper and right lower lobe pulmonary nodules. Grossly similar right upper lobe peribronchial lesion and nodularity, similar to slight increase in size of conglomerate maria isabel hepatic and peripancreatic lymphadenopathy, grossly similar metastatic liver lesions.   3. PET/CT 24--Findings concerning for progression of primary rectal mass and metastatic disease.   4. CT A/P done 24--long segment rectal wall thickening and nodularity compatible with known rectal malignancy, multiple calcified abdominopelvic lymph nodes compatible with metastatic nodes. Reference portal caval lymph node measures 2.4 cm short axis, Multiple bilobar hypodense, variably calcified hepatic masses compatible with  colorectal metastases. Largest discrete lesion in the right lobe measures approximately 8.7 cm, findings similar to prior.   5. CT C/A/P done 7/24/24--progressive pulmonary metastatic disease, bulky branching endobronchial tumor anterior RUL, appears enlarged but difficult to measure, 4.5cm (prior 2.5cm), nodule at TIGRE measures 10mm (prior 7mm), nodule RLL measures 16mm (prior 12mm), progressive hepatic metastatic disease, few lesions enlarged two in hepatic segment VI measure 2.6 and 4.4cm (prior 1.9 and 4cm), majority appear stable, large pathologic lymph nodes in maria isabel hepatis have progressed, for example LN anterior to celiac artery measures 2.8cm vs. Prior 2.3cm, new 2cm splenic lesion, potentially metastatic, large rectal mass 8cm, similar, invading the mesorectal fat, grossly stable partially calcified metastatic lymph nodes in mesorectal fat and retroperitoneum.   6. CT C/A/P done 10/9/24--Areas of endobronchial soft tissue in the right upper lobe have similar overall size, solid nodule in the right lower lobe measures 17 mm, previously 16 mm. The left upper lobe nodule measures 13 mm, previously 10 mm. No definitive new suspicious nodule. Stable 11 mm right lower paratracheal lymph node. Liver lesions and periportal lymph nodes have similar sizes since July, although with mild decrease in overall attenuation.  Suspect this relates to post treatment effect. Slightly improved rectal wall thickening.  The splenic hypodensity is smaller.  7. CT C/A/P done 1/14/25--Right more advanced than left pulmonary metastatic disease is similar in appearance with no new metastatic disease. Extensive hepatic metastatic disease and abdominopelvic adenopathy is all similar, chronic circumferential rectal wall thickening c/w known primary malignancy is unchanged.     Treatment history:  FOLFOX/Avastin 2/22/23 followed by maintenance.  Progression in 11/2023.   FOLFIRI/Avastin 11/2023--4/29/24 (stopped due to  progression).  Alectinib 24--24 (stopped due to progression).    CEA:   24--904  24--1,131.53  24--1,191.49  24--1,496.72  24--1,073.40  24--1,349.70  24--907.29  10/01/24--1,424.78  10/17/24--914.90  24--922.8  24--825.35  25--786.91    Current treatment plan:   Fruquintinib 5mg daily X 21 days on/7 days off started on 24.   Cycle 7 started on 25.     Chief Complaint: Follow-up    HPI  Patient presents for follow-up of Stage IV rectal cancer. He is doing good. CT C/A/P done recently shows stable findings. He is overall feeling good. Pain controlled. His BP has been running a little high, but he has not been taking Metoprolol BID. He will increase from once daily to BID and keep a watch on his BP. He is till following with palliative care. He is happy to hear the news about his scans.       Past Medical History:   Diagnosis Date    Anxiety     Hypertension       No past surgical history on file.  Family History   Problem Relation Name Age of Onset    Hypertension Mother      Coronary artery disease Father Bora Swanson Jr.     Hypertension Father Bora Swanson Jr.     Diabetes Father Bora Swanson Jr.     Cancer Father Bora Swanson Jr.      Social History     Socioeconomic History    Marital status:    Tobacco Use    Smoking status: Former     Current packs/day: 0.00     Average packs/day: 0.5 packs/day for 10.0 years (5.0 ttl pk-yrs)     Types: Cigarettes     Quit date: 2016     Years since quittin.0    Smokeless tobacco: Never   Substance and Sexual Activity    Alcohol use: Never    Drug use: Yes     Types: Marijuana    Sexual activity: Yes     Partners: Female     Birth control/protection: None       Review of patient's allergies indicates:  No Known Allergies   Current Outpatient Medications on File Prior to Visit   Medication Sig Dispense Refill    albuterol (PROVENTIL) 2.5 mg /3 mL (0.083 %) nebulizer  solution Inhale 2.5 mg into the lungs every 6 (six) hours as needed.      ALPRAZolam (XANAX) 0.25 MG tablet Take 1 tablet (0.25 mg total) by mouth 2 (two) times daily as needed for Anxiety. 60 tablet 0    amLODIPine-benazepriL (LOTREL) 10-40 mg per capsule TAKE 1 CAPSULE BY MOUTH DAILY 30 capsule 3    ascorbic acid, vitamin C, (VITAMIN C) 1000 MG tablet Take 4 tablets by mouth every evening.      cholecalciferol, vitamin D3, (VITAMIN D3) 25 mcg (1,000 unit) capsule Take 1 capsule by mouth every evening.      CMPD hydrocortisone 2%- LIDOcaine 3% suppository (RECTAL ROCKET) Place 1 suppository rectally every 6 (six) hours as needed (anorectal pain). Insert 1 suppository 3/4 of the way onto rectum. Wet for easier application. Repeat for 3 nights. 12 applicator 1    cyanocobalamin (VITAMIN B-12) 100 MCG tablet Take 1 tablet by mouth every evening.      DULoxetine (CYMBALTA) 30 MG capsule Take 1 capsule (30 mg total) by mouth once daily. (Patient taking differently: Take 30 mg by mouth 2 (two) times daily as needed.) 30 capsule 1    fruquintinib 5 mg Cap Take 1 capsule (5 mg) by mouth once daily for 21 days, then off for 7 days 21 capsule 6    gabapentin (NEURONTIN) 300 MG capsule Take 300 mg by mouth every evening.      LIDOcaine (LIDODERM) 5 % Place 1 patch onto the skin once daily. Remove & Discard patch within 12 hours or as directed by MD 15 patch 1    magnesium 200 mg Tab Take 2 tablets by mouth every evening.      metoprolol tartrate (LOPRESSOR) 25 MG tablet Take 1 tablet (25 mg total) by mouth 2 (two) times daily. 60 tablet 3    NON FORMULARY MEDICATION Take 2 tablets by mouth Daily. Iron blood builder      NON FORMULARY MEDICATION Take 1 Piece by mouth daily as needed (pain). THC gummies. Patient stated 1 piece or less per day.      NON FORMULARY MEDICATION Quoc Gage oil (THC oil)      omeprazole (PRILOSEC) 20 MG capsule Take 1 capsule by mouth every morning.      ondansetron (ZOFRAN) 4 MG tablet Take 4 mg by  mouth every 8 (eight) hours as needed.      promethazine (PHENERGAN) 25 MG tablet Take 1 tablet (25 mg total) by mouth every 6 (six) hours as needed for Nausea. 34 tablet 2    traMADoL (ULTRAM) 50 mg tablet Take 1 tablet (50 mg total) by mouth every 6 (six) hours. 30 tablet 0    zinc acetate 50 mg (zinc) Cap Take 1 tablet by mouth every evening.       No current facility-administered medications on file prior to visit.      Review of Systems   Constitutional:  Positive for appetite change and fatigue. Unexpected weight change: weight fluctuates.  HENT:  Negative for mouth sores.    Eyes:  Negative for visual disturbance.   Respiratory:  Positive for cough. Negative for shortness of breath.    Cardiovascular:  Negative for chest pain.   Gastrointestinal:  Positive for abdominal pain and constipation. Negative for diarrhea.   Genitourinary:  Positive for frequency.   Musculoskeletal:  Positive for back pain.   Integumentary:  Positive for rash.   Neurological:  Negative for headaches.   Hematological:  Negative for adenopathy.   Psychiatric/Behavioral:  The patient is nervous/anxious.          There were no vitals filed for this visit.      Wt Readings from Last 3 Encounters:   01/09/25 0958 90.4 kg (199 lb 4.8 oz)   12/18/24 1043 89.8 kg (198 lb)   12/09/24 1105 89.6 kg (197 lb 9.6 oz)     Physical Exam  Constitutional:       General: He is awake.      Appearance: Normal appearance.   HENT:      Head: Normocephalic and atraumatic.      Nose: Nose normal.      Mouth/Throat:      Mouth: Mucous membranes are moist.   Eyes:      General: Vision grossly intact.      Extraocular Movements: Extraocular movements intact.      Conjunctiva/sclera: Conjunctivae normal.   Pulmonary:      Effort: Pulmonary effort is normal.   Chest:      Comments: Left chest wall mediport in place  Abdominal:      General: Abdomen is protuberant. Bowel sounds are normal. There is no distension.      Palpations: Abdomen is soft.      Tenderness:  There is no abdominal tenderness.   Musculoskeletal:      Cervical back: Neck supple.      Right lower leg: No edema.      Left lower leg: No edema.   Lymphadenopathy:      Cervical: No cervical adenopathy.      Upper Body:      Right upper body: No supraclavicular adenopathy.      Left upper body: No supraclavicular adenopathy.   Neurological:      Mental Status: He is alert and oriented to person, place, and time.      Motor: Motor function is intact.   Psychiatric:         Mood and Affect: Mood normal.         Speech: Speech normal.         Behavior: Behavior is cooperative.         Judgment: Judgment normal.       No visits with results within 1 Day(s) from this visit.   Latest known visit with results is:   Lab Visit on 01/09/2025   Component Date Value Ref Range Status    Sodium 01/09/2025 136  136 - 145 mmol/L Final    Potassium 01/09/2025 3.6  3.5 - 5.1 mmol/L Final    Chloride 01/09/2025 103  98 - 107 mmol/L Final    CO2 01/09/2025 25  22 - 29 mmol/L Final    Glucose 01/09/2025 174 (H)  74 - 100 mg/dL Final    Blood Urea Nitrogen 01/09/2025 8.4 (L)  8.9 - 20.6 mg/dL Final    Creatinine 01/09/2025 0.81  0.72 - 1.25 mg/dL Final    Calcium 01/09/2025 9.1  8.4 - 10.2 mg/dL Final    Protein Total 01/09/2025 7.5  6.4 - 8.3 gm/dL Final    Albumin 01/09/2025 3.6  3.5 - 5.0 g/dL Final    Globulin 01/09/2025 3.9 (H)  2.4 - 3.5 gm/dL Final    Albumin/Globulin Ratio 01/09/2025 0.9 (L)  1.1 - 2.0 ratio Final    Bilirubin Total 01/09/2025 0.4  <=1.5 mg/dL Final    ALP 01/09/2025 319 (H)  40 - 150 unit/L Final    ALT 01/09/2025 65 (H)  0 - 55 unit/L Final    AST 01/09/2025 69 (H)  5 - 34 unit/L Final    eGFR 01/09/2025 >60  mL/min/1.73/m2 Final    Anion Gap 01/09/2025 8.0  mEq/L Final    BUN/Creatinine Ratio 01/09/2025 10   Final    Carcinoembryonic Antigen 01/09/2025 786.91 (H)  0.00 - 3.00 ng/mL Final    WBC 01/09/2025 7.16  4.50 - 11.50 x10(3)/mcL Final    RBC 01/09/2025 4.81  4.70 - 6.10 x10(6)/mcL Final    Hgb  01/09/2025 14.7  14.0 - 18.0 g/dL Final    Hct 01/09/2025 43.1  42.0 - 52.0 % Final    MCV 01/09/2025 89.6  80.0 - 94.0 fL Final    MCH 01/09/2025 30.6  27.0 - 31.0 pg Final    MCHC 01/09/2025 34.1  33.0 - 36.0 g/dL Final    RDW 01/09/2025 13.7  11.5 - 17.0 % Final    Platelet 01/09/2025 217  130 - 400 x10(3)/mcL Final    MPV 01/09/2025 10.3  7.4 - 10.4 fL Final    Neut % 01/09/2025 74.0  % Final    Lymph % 01/09/2025 17.5  % Final    Mono % 01/09/2025 5.2  % Final    Eos % 01/09/2025 2.8  % Final    Basophil % 01/09/2025 0.4  % Final    Imm Grans % 01/09/2025 0.1  % Final    Neut # 01/09/2025 5.30  2.1 - 9.2 x10(3)/mcL Final    Lymph # 01/09/2025 1.25  0.6 - 4.6 x10(3)/mcL Final    Mono # 01/09/2025 0.37  0.1 - 1.3 x10(3)/mcL Final    Eos # 01/09/2025 0.20  0 - 0.9 x10(3)/mcL Final    Baso # 01/09/2025 0.03  <=0.2 x10(3)/mcL Final    Imm Gran # 01/09/2025 0.01  0.00 - 0.04 x10(3)/mcL Final         Assessment:       1. Rectal cancer    2. Secondary malignant neoplasm of liver    3. 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 Malignant neoplasm metastatic to both lungs        Plan:       Patient with Stage IV rectal cancer, lung, liver, abdominal mariya metastases diagnosed in 2/2023. KRAS mutated.  Patient has failed FOLFOX avastin and FOLFIRI avastin.  Most Recent PET from 4/29/24 shows disease progression and CEA rising.    In review of previous Foundation One testing, noted to have an ALK rearrangement.   I did a literature review and there have been documented responses in colon cancers treatment with ALK inhibitors.   Case presented at [facility] clinical trials tumor board. No current clinical trials available, but treatment recommended with Alectinib.   Case presented at molecular tumor board on 6/4/24, agreed with trial of Alectinib.    Discussed conventional treatment with 3rd line Stivarga vs. Lonsurf which have not historically had good outcomes. Also discussed incurable nature of  disease and continued palliative goals of treatment.  Would favor a more aggressive approach to treatment given his excellent functional status and young age. Did not think Keytruda or immunotherapy would be effective given JESUS.     Patient started Alectinib 600mg PO BID on 5/22/24.  CT done 7/24/24 showed some stable disease, but mostly progression.  Recommend to change treatment to Fruquintinib per N[facility]N.   Received 2 units PRBC on 7/29/24 for worsening anemia.    Patient started Fruquintinib 5mg daily X 21 days on/7 days off on 8/5/24.   Patient continues tolerating well.   C7 started on 1/20/25.    CT C/A/P done 1/14/25 shows stable disease,  Recommend to continue with same treatment.    Last labs with CBC good, CMP with continued elevated LFTs, stable and CEA continues decreasing.      F/u in 3 weeks with repeat labs and visit.     Continue Lotrel 10/40 daily, added Metoprolol 25mg PO BID. Patient to take BID, was only taking once daily.     Baseline urine protein done 7/26/24. 24-hour urine okay with 449mg of protein.   Urine again with 3+ protein. 24 hour repeated in 11/2024 <2g so we are good.     No need to keep checking urine protein once a month. May repeat in 3-4 months.     Patient will continue follow-up with palliative care for pain management, insomnia and anxiety management.   Continue supplements with Miles Perret--Boost Breeze or Ensure Clear supplements.   Patient has no FH of any cancer, may need genetic testing due to the APC mutation.    Seen at [facility] for clinical trial options and if progressive disease on next scans, they identified the following trial options for him:  1. 2022-0276, [facility]antal  2. 2020-1340 RP-6306 (PKMYT1 kinase inhibitor), [place]: Edgar Patricia  3. 2024-0004: APR-1051 (Wee1 inhibitor). [place]: Lalitha Christianson  4. [facility] with [facility] Viki Frank     All questions answered at this time.      Renee Gipson MD    This is a telemedicine note. Patient was treated using  telemedicine, realtime audio and video, according to [facility] protocol. I, distant provider, conducted the visit from [facility]. The patient participated in the visit at a non-[facility] location selected by the patient, identified at his/her home. I am licensed in the state where the patient stated he or she was located. The patient stated that he/she understood and accepted the privacy and security risks to their information at their location.

## 2025-01-18 DIAGNOSIS — I10 HYPERTENSION, UNSPECIFIED TYPE: ICD-10-CM

## 2025-01-21 RX ORDER — AMLODIPINE AND BENAZEPRIL HYDROCHLORIDE 10; 40 MG/1; MG/1
1 CAPSULE ORAL
Qty: 30 CAPSULE | Refills: 3 | Status: SHIPPED | OUTPATIENT
Start: 2025-01-21 | End: 2025-01-29 | Stop reason: SDUPTHER

## 2025-01-23 ENCOUNTER — OFFICE VISIT (OUTPATIENT)
Dept: HEMATOLOGY/ONCOLOGY | Facility: CLINIC | Age: 45
End: 2025-01-23
Payer: COMMERCIAL

## 2025-01-23 DIAGNOSIS — C77.8 MALIGNANT NEOPLASM METASTATIC TO LYMPH NODES OF MULTIPLE SITES: ICD-10-CM

## 2025-01-23 DIAGNOSIS — C20 RECTAL CANCER: Primary | ICD-10-CM

## 2025-01-23 DIAGNOSIS — C78.01 MALIGNANT NEOPLASM METASTATIC TO BOTH LUNGS: ICD-10-CM

## 2025-01-23 DIAGNOSIS — C78.7 SECONDARY MALIGNANT NEOPLASM OF LIVER: ICD-10-CM

## 2025-01-23 DIAGNOSIS — C78.02 MALIGNANT NEOPLASM METASTATIC TO BOTH LUNGS: ICD-10-CM

## 2025-01-23 PROCEDURE — 4010F ACE/ARB THERAPY RXD/TAKEN: CPT | Mod: CPTII,95,, | Performed by: INTERNAL MEDICINE

## 2025-01-23 PROCEDURE — 98007 SYNCH AUDIO-VIDEO EST HI 40: CPT | Mod: 95,,, | Performed by: INTERNAL MEDICINE

## 2025-01-23 PROCEDURE — 1159F MED LIST DOCD IN RCRD: CPT | Mod: CPTII,95,, | Performed by: INTERNAL MEDICINE

## 2025-01-23 PROCEDURE — 1160F RVW MEDS BY RX/DR IN RCRD: CPT | Mod: CPTII,95,, | Performed by: INTERNAL MEDICINE

## 2025-01-28 DIAGNOSIS — I10 HYPERTENSION, UNSPECIFIED TYPE: ICD-10-CM

## 2025-01-28 RX ORDER — AMLODIPINE AND BENAZEPRIL HYDROCHLORIDE 10; 40 MG/1; MG/1
1 CAPSULE ORAL
Qty: 30 CAPSULE | Refills: 3 | OUTPATIENT
Start: 2025-01-28

## 2025-01-29 RX ORDER — AMLODIPINE AND BENAZEPRIL HYDROCHLORIDE 10; 40 MG/1; MG/1
1 CAPSULE ORAL DAILY
Qty: 30 CAPSULE | Refills: 3 | Status: SHIPPED | OUTPATIENT
Start: 2025-01-29

## 2025-02-03 ENCOUNTER — OFFICE VISIT (OUTPATIENT)
Dept: HEMATOLOGY/ONCOLOGY | Facility: CLINIC | Age: 45
End: 2025-02-03
Payer: COMMERCIAL

## 2025-02-03 DIAGNOSIS — F43.21 SITUATIONAL DEPRESSION: Primary | ICD-10-CM

## 2025-02-03 PROCEDURE — 99999 PR PBB SHADOW E&M-EST. PATIENT-LVL I: CPT | Mod: PBBFAC,,, | Performed by: SOCIAL WORKER

## 2025-02-03 PROCEDURE — 90791 PSYCH DIAGNOSTIC EVALUATION: CPT | Mod: S$GLB,,, | Performed by: SOCIAL WORKER

## 2025-02-03 PROCEDURE — 4010F ACE/ARB THERAPY RXD/TAKEN: CPT | Mod: CPTII,S$GLB,, | Performed by: SOCIAL WORKER

## 2025-02-03 NOTE — PROGRESS NOTES
Psychiatry Initial Visit (PhD/LCSW)  Diagnostic Interview - CPT 25311    Date: 2/3/2025    Site: [place]    Referral source: Renee Gipson MD    Clinical status of patient: Outpatient    Owen Swanson, a 44 y.o. male, for initial evaluation visit.  Met with patient.    Chief complaint/reason for encounter: depression, mood swings, and anger    History of present illness: Owen Swanson is a 44 year old male who sees Dr. Gipson in the Oncology Clinic. His wife reached out to me for support. She was speaking on behalf of her , Owen, as he was in agreement that he needed therapy. We scheduled an appointment where we met initially for 75 minutes. Owen's main complaint is his life. He hates his life, it is not what he is accustomed to and he wants to go back to his life before cancer. He has no drive or motivation to do anything different. I spent most of this session gathering information about his family life, a brief history and what is going on with him currently. There are some struggles at home related to his step-daughter that we addressed. I will continue to gather information. He is not suicidal.    Pain: not assessed    Symptoms:   Mood: psychomotor agitation, thoughts of death, and social isolation  Anxiety: restlessness/keyed up and irritability  Substance abuse: not assessed  Cognitive functioning: denied  Health behaviors: noncontributory    Psychiatric history:  not assessed    Medical history: see notes    Family history of psychiatric illness: not known    Social history (marriage, employment, etc.):     Substance use:   Alcohol:  not assessed   Drugs: not assessed   Tobacco: not assessed   Caffeine: not assessed    Current medications and drug reactions (include OTC, herbal): see medication list     Strengths and liabilities: Strength: Patient accepts guidance/feedback, Strength: Patient is expressive/articulate., Strength: Patient is intelligent., Strength: Patient is motivated for change.,  Strength: Patient is physically healthy., Strength: Patient has positive support network., Strength: Patient has reasonable judgment., Strength: Patient is stable., Liability: Patient lacks coping skills.    Current Evaluation:     Mental Status Exam:  General Appearance:  unremarkable, age appropriate   Speech: normal tone, normal rate, normal pitch, normal volume, loud      Level of Cooperation: cooperative      Thought Processes: normal and logical   Mood: steady, angry, irritable      Thought Content: normal, no suicidality, no homicidality, delusions, or paranoia   Affect: congruent and appropriate, irritable, mood-congruent   Orientation: Oriented x3   Memory: recent >  intact   Attention Span & Concentration: intact   Fund of General Knowledge: intact and appropriate to age and level of education   Abstract Reasoning: interpretation of similarities was abstract   Judgment & Insight: good     Language  intact     Diagnostic Impression - Plan:       ICD-10-CM ICD-9-CM   1. Situational depression  F43.21 309.0       Plan:individual psychotherapy    Return to Clinic:  Owen will call me to schedule.    Length of Service (minutes): 60

## 2025-02-04 ENCOUNTER — TELEPHONE (OUTPATIENT)
Dept: PALLIATIVE MEDICINE | Facility: CLINIC | Age: 45
End: 2025-02-04
Payer: COMMERCIAL

## 2025-02-04 NOTE — TELEPHONE ENCOUNTER
Medication needing refill- Tramadol 50 mg   Last refill written- 01/17/2025  Previous appointment- 12/18/2024  Upcoming appointment- 02/19/2025  How is the prescription is written? Take 1 tablet bu mouth every 6 hours   How many dose(s) per day is patient taking   2 tablets per day  Is the medication effective- yes   -If not, why?  Any remaining refills- none   Quantity remaining- 0 tablets   [facility]     Pain assessment-  Pain rating before taking PRN pain medication- 8-9/10  Pain rating after taking PRN pain medication- 1-2/10  Type- aching pain   Radiating- lower back   Alleviating factors- (pharmacologic and non-pharmacologic)- medication and rest   Aggravating factors- Activity

## 2025-02-05 NOTE — TELEPHONE ENCOUNTER
I called patient and informed him of medication refill. Patient verbalized understanding and expressed gratitude.

## 2025-02-06 ENCOUNTER — TELEPHONE (OUTPATIENT)
Dept: PALLIATIVE MEDICINE | Facility: CLINIC | Age: 45
End: 2025-02-06
Payer: COMMERCIAL

## 2025-02-06 DIAGNOSIS — C20 RECTAL CANCER: Primary | ICD-10-CM

## 2025-02-06 DIAGNOSIS — C19 COLORECTAL CANCER, STAGE IV: ICD-10-CM

## 2025-02-06 DIAGNOSIS — C18.9 COLON CANCER METASTASIZED TO LUNG: ICD-10-CM

## 2025-02-06 DIAGNOSIS — C78.00 COLON CANCER METASTASIZED TO LUNG: ICD-10-CM

## 2025-02-06 RX ORDER — GABAPENTIN 300 MG/1
300 CAPSULE ORAL NIGHTLY
Qty: 30 CAPSULE | Refills: 2 | Status: SHIPPED | OUTPATIENT
Start: 2025-02-06

## 2025-02-06 RX ORDER — FRUQUINTINIB 5 MG/1
5 CAPSULE ORAL DAILY
Qty: 21 CAPSULE | Refills: 6 | Status: ACTIVE | OUTPATIENT
Start: 2025-02-06

## 2025-02-06 NOTE — TELEPHONE ENCOUNTER
Medication needing refill- Xanax 0.25 mg   Last refill written- 11/20/2024  Previous appointment- 12/18/2024  Upcoming appointment- 02/19/2025  How is the prescription is written?Take 1 tablet by mouth 2 times daily as needed for Anxiety   How many dose(s) per day is patient taking 1 tablets per day   Is the medication effective- yes   -If not, why?  Any remaining refills- none   Quantity remaining- 8 tablets remaining   Pharmacy- [facility]     Anxiety assessment-  Anxiety rating before taking PRN anxiety medication- 10/10  Anxiety rating after taking PRN anxiety medication- 0-1/10  Alleviating factors- (pharmacologic and non-pharmacologic)- medication and rest   Aggravating factors- daily living

## 2025-02-11 NOTE — PROGRESS NOTES
Subjective:       Patient ID: Owen Swanson is a 44 y.o. male.    Rectal Cancer Stage IV--Diagnosed 23  Biopsy/pathology:   1. Colonoscopy 23--large fungating rectal mass 5-10cm from anal verge to approximately 20cm, biopsy with well differentiated colorectal adenocarcinoma.   2. Bronchoscopy with biopsy of endobronchial mass RUL biopsy done 3/16/23--metastatic poorly differentiated colonic adenocarcinoma. Foundation One with PD-L1 0%, ALK rearrangement, KRAS G12V, AKT2 amplification, APCV, FBXW7, CCND3 amplification, TP53, VEGFA amplification, TMB 8, MSI-high not detected, elevated tumor fraction.   3. Liquid Biopsy Guardant 360 done 24--KRAS G12V, APC , TP53 R248W, MSI high not detected, NRAS, BRAF, Her2 and NTRK negative.  Guardant 360 liquid biopsy done 24--KRAS G12V, APC and TP53 mutations.   Imagin. PET/CT 24--interval enlargement and increased metabolic activity of primary rectal mass, c/w local disease progression, stable hypermetabolic hepatic metastatic disease, grossly stable infiltrating tumor in RUL bronchus, suspected subtle interval enlargement of subcentimeter nodule in TIGRE and RLL.   2. CT C/A/P w/ contrast done at [facility] 3/18/24--Similar to slight increase in size of left upper and right lower lobe pulmonary nodules. Grossly similar right upper lobe peribronchial lesion and nodularity, similar to slight increase in size of conglomerate maria isabel hepatic and peripancreatic lymphadenopathy, grossly similar metastatic liver lesions.   3. PET/CT 24--Findings concerning for progression of primary rectal mass and metastatic disease.   4. CT A/P done 24--long segment rectal wall thickening and nodularity compatible with known rectal malignancy, multiple calcified abdominopelvic lymph nodes compatible with metastatic nodes. Reference portal caval lymph node measures 2.4 cm short axis, Multiple bilobar hypodense, variably calcified hepatic masses compatible with  colorectal metastases. Largest discrete lesion in the right lobe measures approximately 8.7 cm, findings similar to prior.   5. CT C/A/P done 7/24/24--progressive pulmonary metastatic disease, bulky branching endobronchial tumor anterior RUL, appears enlarged but difficult to measure, 4.5cm (prior 2.5cm), nodule at TIGRE measures 10mm (prior 7mm), nodule RLL measures 16mm (prior 12mm), progressive hepatic metastatic disease, few lesions enlarged two in hepatic segment VI measure 2.6 and 4.4cm (prior 1.9 and 4cm), majority appear stable, large pathologic lymph nodes in maria isabel hepatis have progressed, for example LN anterior to celiac artery measures 2.8cm vs. Prior 2.3cm, new 2cm splenic lesion, potentially metastatic, large rectal mass 8cm, similar, invading the mesorectal fat, grossly stable partially calcified metastatic lymph nodes in mesorectal fat and retroperitoneum.   6. CT C/A/P done 10/9/24--Areas of endobronchial soft tissue in the right upper lobe have similar overall size, solid nodule in the right lower lobe measures 17 mm, previously 16 mm. The left upper lobe nodule measures 13 mm, previously 10 mm. No definitive new suspicious nodule. Stable 11 mm right lower paratracheal lymph node. Liver lesions and periportal lymph nodes have similar sizes since July, although with mild decrease in overall attenuation.  Suspect this relates to post treatment effect. Slightly improved rectal wall thickening.  The splenic hypodensity is smaller.  7. CT C/A/P done 1/14/25--Right more advanced than left pulmonary metastatic disease is similar in appearance with no new metastatic disease. Extensive hepatic metastatic disease and abdominopelvic adenopathy is all similar, chronic circumferential rectal wall thickening c/w known primary malignancy is unchanged.     Treatment history:  FOLFOX/Avastin 2/22/23 followed by maintenance.  Progression in 11/2023.   FOLFIRI/Avastin 11/2023--4/29/24 (stopped due to  progression).  Alectinib 24--24 (stopped due to progression).    CEA:   24--904  24--1,131.53  24--1,191.49  24--1,496.72  24--1,073.40  24--1,349.70  24--907.29  10/01/24--1,424.78  10/17/24--914.90  24--922.8  24--825.35  25--786.91    Current treatment plan:   Fruquintinib 5mg daily X 21 days on/7 days off started on 24.   Cycle 8 started on 25.     Chief Complaint: Abdominal Pain, Back Pain, and Nausea    HPI  Patient presents for follow-up of Stage IV rectal cancer. He is not having a good day. Reports having abdominal pain and back pain and nausea. Plans to go home and take some Gabapentin and Promethazine. He reports that his BP is always high despite being on the Lotrel and Metoprolol BID.       Past Medical History:   Diagnosis Date    Anxiety     Hypertension       History reviewed. No pertinent surgical history.  Family History   Problem Relation Name Age of Onset    Hypertension Mother      Coronary artery disease Father Bora Swanson Jr.     Hypertension Father Bora Swanson Jr.     Diabetes Father Bora Swanson Jr.     Cancer Father Bora Swanson Jr.      Social History     Socioeconomic History    Marital status:    Tobacco Use    Smoking status: Former     Current packs/day: 0.00     Average packs/day: 0.5 packs/day for 10.0 years (5.0 ttl pk-yrs)     Types: Cigarettes     Quit date: 2016     Years since quittin.1    Smokeless tobacco: Never   Substance and Sexual Activity    Alcohol use: Never    Drug use: Yes     Types: Marijuana    Sexual activity: Yes     Partners: Female     Birth control/protection: None       Review of patient's allergies indicates:  No Known Allergies   Current Outpatient Medications on File Prior to Visit   Medication Sig Dispense Refill    albuterol (PROVENTIL) 2.5 mg /3 mL (0.083 %) nebulizer solution Inhale 2.5 mg into the lungs every 6 (six) hours as needed.       ALPRAZolam (XANAX) 0.25 MG tablet Take 1 tablet (0.25 mg total) by mouth 2 (two) times daily as needed for Anxiety. 14 tablet 0    amLODIPine-benazepriL (LOTREL) 10-40 mg per capsule Take 1 capsule by mouth once daily. 30 capsule 3    ascorbic acid, vitamin C, (VITAMIN C) 1000 MG tablet Take 4 tablets by mouth every evening.      cholecalciferol, vitamin D3, (VITAMIN D3) 25 mcg (1,000 unit) capsule Take 1 capsule by mouth every evening.      cyanocobalamin (VITAMIN B-12) 100 MCG tablet Take 1 tablet by mouth every evening.      fruquintinib (FRUZAQLA) 5 mg Cap Take 1 capsule (5 mg) by mouth once daily for 21 days, then off for 7 days 21 capsule 6    gabapentin (NEURONTIN) 300 MG capsule Take 1 capsule (300 mg total) by mouth every evening. 30 capsule 2    magnesium 200 mg Tab Take 2 tablets by mouth every evening.      metoprolol tartrate (LOPRESSOR) 25 MG tablet Take 1 tablet (25 mg total) by mouth 2 (two) times daily. 60 tablet 3    NON FORMULARY MEDICATION Take 2 tablets by mouth Daily. Iron blood builder      NON FORMULARY MEDICATION Take 1 Piece by mouth daily as needed (pain). THC gummies. Patient stated 1 piece or less per day.      NON FORMULARY MEDICATION Quoc Gage oil (THC oil)      omeprazole (PRILOSEC) 20 MG capsule Take 1 capsule by mouth every morning.      ondansetron (ZOFRAN) 4 MG tablet Take 4 mg by mouth every 8 (eight) hours as needed.      promethazine (PHENERGAN) 25 MG tablet Take 1 tablet (25 mg total) by mouth every 6 (six) hours as needed for Nausea. 34 tablet 2    traMADoL (ULTRAM) 50 mg tablet Take 1 tablet (50 mg total) by mouth every 6 (six) hours as needed for Pain. 30 tablet 0    zinc acetate 50 mg (zinc) Cap Take 1 tablet by mouth every evening.      CMPD hydrocortisone 2%- LIDOcaine 3% suppository (RECTAL ROCKET) Place 1 suppository rectally every 6 (six) hours as needed (anorectal pain). Insert 1 suppository 3/4 of the way onto rectum. Wet for easier application. Repeat for 3  "nights. (Patient not taking: Reported on 2/17/2025) 12 applicator 1    DULoxetine (CYMBALTA) 30 MG capsule Take 1 capsule (30 mg total) by mouth once daily. (Patient not taking: Reported on 2/17/2025) 30 capsule 1    LIDOcaine (LIDODERM) 5 % Place 1 patch onto the skin once daily. Remove & Discard patch within 12 hours or as directed by MD (Patient not taking: Reported on 2/17/2025) 15 patch 1     No current facility-administered medications on file prior to visit.      Review of Systems   Constitutional:  Positive for appetite change and fatigue. Unexpected weight change: weight fluctuates.  HENT:  Negative for mouth sores.    Eyes:  Negative for visual disturbance.   Respiratory:  Positive for cough. Negative for shortness of breath.    Cardiovascular:  Negative for chest pain.   Gastrointestinal:  Positive for abdominal pain and constipation. Negative for diarrhea.   Genitourinary:  Positive for frequency.   Musculoskeletal:  Positive for back pain.   Integumentary:  Negative for rash.   Neurological:  Negative for headaches.   Hematological:  Negative for adenopathy.   Psychiatric/Behavioral:  The patient is nervous/anxious.          Vitals:    02/17/25 1303   BP: 129/89   Pulse: 83   Resp: 14   Temp: 98.4 °F (36.9 °C)   TempSrc: Oral   SpO2: 98%   Weight: 91.2 kg (201 lb 1.6 oz)   Height: 5' 9" (1.753 m)         Wt Readings from Last 3 Encounters:   02/17/25 1303 91.2 kg (201 lb 1.6 oz)   01/09/25 0958 90.4 kg (199 lb 4.8 oz)   12/18/24 1043 89.8 kg (198 lb)     Physical Exam  Constitutional:       General: He is awake.      Appearance: Normal appearance.   HENT:      Head: Normocephalic and atraumatic.      Nose: Nose normal.      Mouth/Throat:      Mouth: Mucous membranes are moist.   Eyes:      General: Vision grossly intact.      Extraocular Movements: Extraocular movements intact.      Conjunctiva/sclera: Conjunctivae normal.   Pulmonary:      Effort: Pulmonary effort is normal.   Chest:      Comments: " Left chest wall mediport in place  Abdominal:      General: Abdomen is protuberant. Bowel sounds are normal. There is no distension.      Palpations: Abdomen is soft.      Tenderness: There is no abdominal tenderness.   Musculoskeletal:      Cervical back: Neck supple.      Right lower leg: No edema.      Left lower leg: No edema.   Lymphadenopathy:      Cervical: No cervical adenopathy.      Upper Body:      Right upper body: No supraclavicular adenopathy.      Left upper body: No supraclavicular adenopathy.   Neurological:      Mental Status: He is alert and oriented to person, place, and time.      Motor: Motor function is intact.   Psychiatric:         Mood and Affect: Mood normal.         Speech: Speech normal.         Behavior: Behavior is cooperative.         Judgment: Judgment normal.       Lab Visit on 02/17/2025   Component Date Value Ref Range Status    WBC 02/17/2025 9.37  4.50 - 11.50 x10(3)/mcL Final    RBC 02/17/2025 4.76  4.70 - 6.10 x10(6)/mcL Final    Hgb 02/17/2025 14.8  14.0 - 18.0 g/dL Final    Hct 02/17/2025 43.8  42.0 - 52.0 % Final    MCV 02/17/2025 92.0  80.0 - 94.0 fL Final    MCH 02/17/2025 31.1 (H)  27.0 - 31.0 pg Final    MCHC 02/17/2025 33.8  33.0 - 36.0 g/dL Final    RDW 02/17/2025 13.0  11.5 - 17.0 % Final    Platelet 02/17/2025 168  130 - 400 x10(3)/mcL Final    MPV 02/17/2025 10.6 (H)  7.4 - 10.4 fL Final    Neut % 02/17/2025 79.5  % Final    Lymph % 02/17/2025 11.7  % Final    Mono % 02/17/2025 6.9  % Final    Eos % 02/17/2025 1.5  % Final    Basophil % 02/17/2025 0.2  % Final    Imm Grans % 02/17/2025 0.2  % Final    Neut # 02/17/2025 7.44  2.1 - 9.2 x10(3)/mcL Final    Lymph # 02/17/2025 1.10  0.6 - 4.6 x10(3)/mcL Final    Mono # 02/17/2025 0.65  0.1 - 1.3 x10(3)/mcL Final    Eos # 02/17/2025 0.14  0 - 0.9 x10(3)/mcL Final    Baso # 02/17/2025 0.02  <=0.2 x10(3)/mcL Final    Imm Gran # 02/17/2025 0.02  0.00 - 0.04 x10(3)/mcL Final         Assessment:       1. Rectal cancer    2.  Secondary malignant neoplasm of liver    3. 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 Malignant neoplasm metastatic to both lungs        Plan:       Patient with Stage IV rectal cancer, lung, liver, abdominal mariya metastases diagnosed in 2/2023. KRAS mutated.  Patient has failed FOLFOX avastin and FOLFIRI avastin.  Most Recent PET from 4/29/24 shows disease progression and CEA rising.    In review of previous Foundation One testing, noted to have an ALK rearrangement.   I did a literature review and there have been documented responses in colon cancers treatment with ALK inhibitors.   Case presented at [facility] clinical trials tumor board. No current clinical trials available, but treatment recommended with Alectinib.   Case presented at molecular tumor board on 6/4/24, agreed with trial of Alectinib.    Discussed conventional treatment with 3rd line Stivarga vs. Lonsurf which have not historically had good outcomes. Also discussed incurable nature of disease and continued palliative goals of treatment.  Would favor a more aggressive approach to treatment given his excellent functional status and young age. Did not think Keytruda or immunotherapy would be effective given JESUS.     Patient started Alectinib 600mg PO BID on 5/22/24.  CT done 7/24/24 showed some stable disease, but mostly progression.  Recommend to change treatment to Fruquintinib per NCCN.   Received 2 units PRBC on 7/29/24 for worsening anemia.    Patient started Fruquintinib 5mg daily X 21 days on/7 days off on 8/5/24.   Patient continues tolerating well.   CT C/A/P done 1/14/25 shows stable disease.    C8 started on 2/17/25.  BP continues running high. Will increase Metoprolol to 50mg PO BID. If BP continues to be high, can change Lotrel to higher dose of 10/40 daily.     Recommend to continue with same treatment.    Last labs with CBC good, CMP pending. Last CEA was improved.       F/u in 4 weeks with repeat labs and  visit. Can recheck urine protein at that time as well.  Plan to repeat CT C/A/P in 4/2025.    Baseline urine protein done 7/26/24. 24-hour urine okay with 449mg of protein.   Urine again with 3+ protein. 24 hour repeated in 11/2024 <2g so we are good.     No need to keep checking urine protein once a month. May repeat in 3-4 months.     Patient will continue follow-up with palliative care for pain management, insomnia and anxiety management.   Continue supplements with Miles Perret--Boost Breeze or Ensure Clear supplements.   Patient has no FH of any cancer, may need genetic testing due to the APC mutation.    Seen at [facility] for clinical trial options and if progressive disease on next scans, they identified the following trial options for him:  1. 2022-0276, [facility] [place] [facility], [place]: Chantal  2. 2020-1340 RP-6306 (PKMYT1 kinase inhibitor), [place]: Edgar Patricia  3. 2024-0004: APR-1051 (Wee1 inhibitor). [place]: Lalitha Christianson  4. [place] trial with Dr. Viki Frank     All questions answered at this time.      Renee Gipson MD    Visit today included increased complexity associated with the care of the episodic problem metastatic rectal cancer, addressing and managing the longitudinal care of the patient's rectal cancer.     Addendum:  Tumor marking increased, LFTs also with some increase, which may be medication-related.  Will obtain CT before next clinic visit instead of waiting until April.  Patient notified and told to call us with any new symptoms or increase in pain.     Renee Gipson MD

## 2025-02-12 ENCOUNTER — OFFICE VISIT (OUTPATIENT)
Dept: HEMATOLOGY/ONCOLOGY | Facility: CLINIC | Age: 45
End: 2025-02-12
Payer: COMMERCIAL

## 2025-02-12 DIAGNOSIS — F32.A DEPRESSION, UNSPECIFIED DEPRESSION TYPE: Primary | ICD-10-CM

## 2025-02-12 DIAGNOSIS — R45.89 ANXIETY ABOUT HEALTH: ICD-10-CM

## 2025-02-12 PROCEDURE — 90837 PSYTX W PT 60 MINUTES: CPT | Mod: S$GLB,,, | Performed by: SOCIAL WORKER

## 2025-02-12 PROCEDURE — 4010F ACE/ARB THERAPY RXD/TAKEN: CPT | Mod: CPTII,S$GLB,, | Performed by: SOCIAL WORKER

## 2025-02-12 NOTE — PROGRESS NOTES
Individual Psychotherapy (LCSW/PhD)  Owen Swanson,  2/12/2025    Site: [place]     Therapeutic Intervention: Met with patient for individual psychotherapy.    Chief complaint/reason for encounter: depression, mood swings, anger, and pain     Interval history and content of current session: Owen Swanson is a 44 year old male who sees Dr. Gipson in the Oncology Clinic. I met with Owen for 60 minutes today. He comes to therapy willingly and with a lot to talk about. We probably could have met for another 30 minutes. He is eager to hear options on how to handle his anger. I provided some techniques of grounding exercises and I also discussed CBT techniques emphasizing neuropsychology. He stated he felt a difference over the past two weeks. He felt calmer and less angry. He also stated his wife noticed a difference as well. He suffers from physical pain due to his cancer. I attempted to help him separate his pain into just one area rather than associating his whole body being in pain. There continue to be struggles at home related to his step-daughter that we are addressing. He is not suicidal. We will meet again in 2 weeks.      Treatment plan:  Target symptoms: depression, anxiety , mood swings, adjustment, grief, work stress  Why chosen therapy is appropriate versus another modality: relevant to diagnosis, patient responds to this modality, evidence based practice  Outcome monitoring methods: self-report, observation  Therapeutic intervention type: insight oriented psychotherapy, behavior modifying psychotherapy, supportive psychotherapy, interactive psychotherapy    Risk parameters:  Patient reports no suicidal ideation  Patient reports no homicidal ideation  Patient reports no self-injurious behavior  Patient reports no violent behavior    Verbal deficits: None    Patient's response to intervention:  The patient's response to intervention is accepting.    Progress toward goals and other mental status  changes:  The patient's progress toward goals is good.    Diagnosis:     ICD-10-CM ICD-9-CM   1. Depression, unspecified depression type  F32.A 311   2. Anxiety about health  R45.89 799.29       Plan: Pt plans to continue individual psychotherapy    Return to clinic: as scheduled    Length of Service (minutes): 60

## 2025-02-13 ENCOUNTER — TELEPHONE (OUTPATIENT)
Dept: PALLIATIVE MEDICINE | Facility: CLINIC | Age: 45
End: 2025-02-13
Payer: COMMERCIAL

## 2025-02-13 DIAGNOSIS — C19 COLORECTAL CANCER, STAGE IV: ICD-10-CM

## 2025-02-13 DIAGNOSIS — F41.9 ANXIETY AND DEPRESSION: Primary | ICD-10-CM

## 2025-02-13 DIAGNOSIS — C18.9 COLON CANCER METASTASIZED TO LUNG: ICD-10-CM

## 2025-02-13 DIAGNOSIS — C78.00 COLON CANCER METASTASIZED TO LUNG: ICD-10-CM

## 2025-02-13 DIAGNOSIS — F32.A ANXIETY AND DEPRESSION: Primary | ICD-10-CM

## 2025-02-13 RX ORDER — ALPRAZOLAM 0.25 MG/1
0.25 TABLET ORAL 2 TIMES DAILY PRN
Qty: 14 TABLET | Refills: 0 | Status: SHIPPED | OUTPATIENT
Start: 2025-02-13 | End: 2026-02-13

## 2025-02-13 NOTE — TELEPHONE ENCOUNTER
Per office staff, the pharmacy was contacted and confirmed they do not have the prescription for refill.  reviewed. Please notify patient we will provide refill with quantity appropriate until next office visit.

## 2025-02-13 NOTE — TELEPHONE ENCOUNTER
----- Message from Eli sent at 2/13/2025  9:39 AM CST -----  Regarding: prescription  Pt states that he was told that his Xanax refill had been sent into the pharmacy, but the pharmacy has not received any orders/refills, nor is it showing in his current medication list. Please advise.

## 2025-02-17 ENCOUNTER — OFFICE VISIT (OUTPATIENT)
Dept: HEMATOLOGY/ONCOLOGY | Facility: CLINIC | Age: 45
End: 2025-02-17
Payer: COMMERCIAL

## 2025-02-17 ENCOUNTER — LAB VISIT (OUTPATIENT)
Dept: LAB | Facility: HOSPITAL | Age: 45
End: 2025-02-17
Attending: INTERNAL MEDICINE
Payer: COMMERCIAL

## 2025-02-17 VITALS
DIASTOLIC BLOOD PRESSURE: 89 MMHG | HEIGHT: 69 IN | OXYGEN SATURATION: 98 % | TEMPERATURE: 98 F | HEART RATE: 83 BPM | WEIGHT: 201.13 LBS | SYSTOLIC BLOOD PRESSURE: 129 MMHG | RESPIRATION RATE: 14 BRPM | BODY MASS INDEX: 29.79 KG/M2

## 2025-02-17 DIAGNOSIS — C77.8 MALIGNANT NEOPLASM METASTATIC TO LYMPH NODES OF MULTIPLE SITES: ICD-10-CM

## 2025-02-17 DIAGNOSIS — C78.02 MALIGNANT NEOPLASM METASTATIC TO BOTH LUNGS: ICD-10-CM

## 2025-02-17 DIAGNOSIS — C20 RECTAL CANCER: Primary | ICD-10-CM

## 2025-02-17 DIAGNOSIS — C78.7 SECONDARY MALIGNANT NEOPLASM OF LIVER: ICD-10-CM

## 2025-02-17 DIAGNOSIS — C78.01 MALIGNANT NEOPLASM METASTATIC TO BOTH LUNGS: ICD-10-CM

## 2025-02-17 DIAGNOSIS — C20 RECTAL CANCER: ICD-10-CM

## 2025-02-17 LAB
ALBUMIN SERPL-MCNC: 3.4 G/DL (ref 3.5–5)
ALBUMIN/GLOB SERPL: 0.9 RATIO (ref 1.1–2)
ALP SERPL-CCNC: 349 UNIT/L (ref 40–150)
ALT SERPL-CCNC: 103 UNIT/L (ref 0–55)
ANION GAP SERPL CALC-SCNC: 5 MEQ/L
AST SERPL-CCNC: 76 UNIT/L (ref 5–34)
BASOPHILS # BLD AUTO: 0.02 X10(3)/MCL
BASOPHILS NFR BLD AUTO: 0.2 %
BILIRUB SERPL-MCNC: 0.6 MG/DL
BUN SERPL-MCNC: 11.4 MG/DL (ref 8.9–20.6)
CALCIUM SERPL-MCNC: 9.1 MG/DL (ref 8.4–10.2)
CEA SERPL-MCNC: 1286.57 NG/ML (ref 0–3)
CHLORIDE SERPL-SCNC: 105 MMOL/L (ref 98–107)
CO2 SERPL-SCNC: 29 MMOL/L (ref 22–29)
CREAT SERPL-MCNC: 0.79 MG/DL (ref 0.72–1.25)
CREAT/UREA NIT SERPL: 14
EOSINOPHIL # BLD AUTO: 0.14 X10(3)/MCL (ref 0–0.9)
EOSINOPHIL NFR BLD AUTO: 1.5 %
ERYTHROCYTE [DISTWIDTH] IN BLOOD BY AUTOMATED COUNT: 13 % (ref 11.5–17)
GFR SERPLBLD CREATININE-BSD FMLA CKD-EPI: >60 ML/MIN/1.73/M2
GLOBULIN SER-MCNC: 3.9 GM/DL (ref 2.4–3.5)
GLUCOSE SERPL-MCNC: 115 MG/DL (ref 74–100)
HCT VFR BLD AUTO: 43.8 % (ref 42–52)
HGB BLD-MCNC: 14.8 G/DL (ref 14–18)
IMM GRANULOCYTES # BLD AUTO: 0.02 X10(3)/MCL (ref 0–0.04)
IMM GRANULOCYTES NFR BLD AUTO: 0.2 %
LYMPHOCYTES # BLD AUTO: 1.1 X10(3)/MCL (ref 0.6–4.6)
LYMPHOCYTES NFR BLD AUTO: 11.7 %
MCH RBC QN AUTO: 31.1 PG (ref 27–31)
MCHC RBC AUTO-ENTMCNC: 33.8 G/DL (ref 33–36)
MCV RBC AUTO: 92 FL (ref 80–94)
MONOCYTES # BLD AUTO: 0.65 X10(3)/MCL (ref 0.1–1.3)
MONOCYTES NFR BLD AUTO: 6.9 %
NEUTROPHILS # BLD AUTO: 7.44 X10(3)/MCL (ref 2.1–9.2)
NEUTROPHILS NFR BLD AUTO: 79.5 %
PLATELET # BLD AUTO: 168 X10(3)/MCL (ref 130–400)
PMV BLD AUTO: 10.6 FL (ref 7.4–10.4)
POTASSIUM SERPL-SCNC: 4.2 MMOL/L (ref 3.5–5.1)
PROT SERPL-MCNC: 7.3 GM/DL (ref 6.4–8.3)
RBC # BLD AUTO: 4.76 X10(6)/MCL (ref 4.7–6.1)
SODIUM SERPL-SCNC: 139 MMOL/L (ref 136–145)
WBC # BLD AUTO: 9.37 X10(3)/MCL (ref 4.5–11.5)

## 2025-02-17 PROCEDURE — 36415 COLL VENOUS BLD VENIPUNCTURE: CPT

## 2025-02-17 PROCEDURE — 85025 COMPLETE CBC W/AUTO DIFF WBC: CPT

## 2025-02-17 PROCEDURE — 80053 COMPREHEN METABOLIC PANEL: CPT

## 2025-02-17 PROCEDURE — 82378 CARCINOEMBRYONIC ANTIGEN: CPT

## 2025-02-17 RX ORDER — METOPROLOL TARTRATE 50 MG/1
50 TABLET ORAL 2 TIMES DAILY
Qty: 60 TABLET | Refills: 3 | Status: SHIPPED | OUTPATIENT
Start: 2025-02-17 | End: 2026-02-17

## 2025-02-20 ENCOUNTER — TELEPHONE (OUTPATIENT)
Dept: HEMATOLOGY/ONCOLOGY | Facility: CLINIC | Age: 45
End: 2025-02-20
Payer: COMMERCIAL

## 2025-02-20 DIAGNOSIS — C78.01 MALIGNANT NEOPLASM METASTATIC TO BOTH LUNGS: ICD-10-CM

## 2025-02-20 DIAGNOSIS — C20 RECTAL CANCER: Primary | ICD-10-CM

## 2025-02-20 DIAGNOSIS — C77.8 MALIGNANT NEOPLASM METASTATIC TO LYMPH NODES OF MULTIPLE SITES: ICD-10-CM

## 2025-02-20 DIAGNOSIS — C78.7 SECONDARY MALIGNANT NEOPLASM OF LIVER: ICD-10-CM

## 2025-02-20 DIAGNOSIS — C78.02 MALIGNANT NEOPLASM METASTATIC TO BOTH LUNGS: ICD-10-CM

## 2025-02-20 NOTE — TELEPHONE ENCOUNTER
Patient requesting to speak with you directly regarding labs that he had done here this past Monday.

## 2025-02-24 ENCOUNTER — TELEPHONE (OUTPATIENT)
Dept: PALLIATIVE MEDICINE | Facility: CLINIC | Age: 45
End: 2025-02-24
Payer: COMMERCIAL

## 2025-02-24 DIAGNOSIS — F41.9 ANXIETY AND DEPRESSION: ICD-10-CM

## 2025-02-24 DIAGNOSIS — C19 COLORECTAL CANCER, STAGE IV: ICD-10-CM

## 2025-02-24 DIAGNOSIS — C18.9 COLON CANCER METASTASIZED TO LUNG: ICD-10-CM

## 2025-02-24 DIAGNOSIS — C78.00 COLON CANCER METASTASIZED TO LUNG: ICD-10-CM

## 2025-02-24 DIAGNOSIS — F32.A ANXIETY AND DEPRESSION: ICD-10-CM

## 2025-02-24 NOTE — TELEPHONE ENCOUNTER
"Patient called clinic stating" [facility] called me saying that they had a system error with my medication and asking for the  Office to please resubmit my medications.       Medication needing refill- Xanax 0.25 mg   Last refill written- 02/13/25  Previous appointment- 12/18/2024  Upcoming appointment- 02/26/2025  How is the prescription is written? Take 1 tablet by mouth 2 times daily as needed for Anxiety   How many dose(s) per day is patient taking 2 tablets per day  Is the medication effective- yes   -If not, why?  Any remaining refills- 0 refills   Quantity remaining- 0 tablets   Pharmacy- [facility]       Anxiety assessment-  Anxiety rating before taking PRN anxiety medication- 8/10  Anxiety rating after taking PRN anxiety medication- 1-2/10  Alleviating factors- (pharmacologic and non-pharmacologic)- medication  and rest   Aggravating factors- daily living     "

## 2025-02-26 ENCOUNTER — OFFICE VISIT (OUTPATIENT)
Dept: HEMATOLOGY/ONCOLOGY | Facility: CLINIC | Age: 45
End: 2025-02-26
Payer: COMMERCIAL

## 2025-02-26 ENCOUNTER — OFFICE VISIT (OUTPATIENT)
Dept: PALLIATIVE MEDICINE | Facility: CLINIC | Age: 45
End: 2025-02-26
Payer: COMMERCIAL

## 2025-02-26 VITALS
DIASTOLIC BLOOD PRESSURE: 94 MMHG | TEMPERATURE: 99 F | RESPIRATION RATE: 16 BRPM | BODY MASS INDEX: 28.93 KG/M2 | OXYGEN SATURATION: 99 % | HEART RATE: 67 BPM | SYSTOLIC BLOOD PRESSURE: 148 MMHG | HEIGHT: 69 IN | WEIGHT: 195.31 LBS

## 2025-02-26 DIAGNOSIS — T40.2X5A CONSTIPATION DUE TO OPIOID THERAPY: ICD-10-CM

## 2025-02-26 DIAGNOSIS — F41.9 ANXIETY AND DEPRESSION: ICD-10-CM

## 2025-02-26 DIAGNOSIS — G89.3 NEOPLASM RELATED PAIN: ICD-10-CM

## 2025-02-26 DIAGNOSIS — K59.03 CONSTIPATION DUE TO OPIOID THERAPY: ICD-10-CM

## 2025-02-26 DIAGNOSIS — R45.89 ANXIETY ABOUT HEALTH: ICD-10-CM

## 2025-02-26 DIAGNOSIS — C18.9 COLON CANCER METASTASIZED TO LUNG: ICD-10-CM

## 2025-02-26 DIAGNOSIS — C19 COLORECTAL CANCER, STAGE IV: ICD-10-CM

## 2025-02-26 DIAGNOSIS — F32.A ANXIETY AND DEPRESSION: ICD-10-CM

## 2025-02-26 DIAGNOSIS — C78.00 COLON CANCER METASTASIZED TO LUNG: ICD-10-CM

## 2025-02-26 DIAGNOSIS — F32.A DEPRESSION, UNSPECIFIED DEPRESSION TYPE: Primary | ICD-10-CM

## 2025-02-26 DIAGNOSIS — Z51.5 ENCOUNTER FOR PALLIATIVE CARE: Primary | ICD-10-CM

## 2025-02-26 DIAGNOSIS — G47.00 INSOMNIA, UNSPECIFIED TYPE: ICD-10-CM

## 2025-02-26 PROCEDURE — 1160F RVW MEDS BY RX/DR IN RCRD: CPT | Mod: CPTII,S$GLB,,

## 2025-02-26 PROCEDURE — 1159F MED LIST DOCD IN RCRD: CPT | Mod: CPTII,S$GLB,,

## 2025-02-26 PROCEDURE — 3008F BODY MASS INDEX DOCD: CPT | Mod: CPTII,S$GLB,,

## 2025-02-26 PROCEDURE — 90837 PSYTX W PT 60 MINUTES: CPT | Mod: S$GLB,,, | Performed by: SOCIAL WORKER

## 2025-02-26 PROCEDURE — 3077F SYST BP >= 140 MM HG: CPT | Mod: CPTII,S$GLB,,

## 2025-02-26 PROCEDURE — 3080F DIAST BP >= 90 MM HG: CPT | Mod: CPTII,S$GLB,,

## 2025-02-26 PROCEDURE — 4010F ACE/ARB THERAPY RXD/TAKEN: CPT | Mod: CPTII,S$GLB,, | Performed by: SOCIAL WORKER

## 2025-02-26 PROCEDURE — 99215 OFFICE O/P EST HI 40 MIN: CPT | Mod: S$GLB,,,

## 2025-02-26 PROCEDURE — 99999 PR PBB SHADOW E&M-EST. PATIENT-LVL V: CPT | Mod: PBBFAC,,,

## 2025-02-26 PROCEDURE — 4010F ACE/ARB THERAPY RXD/TAKEN: CPT | Mod: CPTII,S$GLB,,

## 2025-02-26 PROCEDURE — 99999 PR PBB SHADOW E&M-EST. PATIENT-LVL I: CPT | Mod: PBBFAC,,, | Performed by: SOCIAL WORKER

## 2025-02-26 RX ORDER — ALPRAZOLAM 0.25 MG/1
0.25 TABLET ORAL 2 TIMES DAILY PRN
Qty: 60 TABLET | Refills: 0 | Status: SHIPPED | OUTPATIENT
Start: 2025-02-26

## 2025-02-26 RX ORDER — LIDOCAINE 50 MG/G
1 PATCH TOPICAL DAILY
Qty: 15 PATCH | Refills: 1 | Status: SHIPPED | OUTPATIENT
Start: 2025-02-26

## 2025-02-26 RX ORDER — OXYCODONE HYDROCHLORIDE 5 MG/1
5 TABLET ORAL EVERY 6 HOURS PRN
Qty: 60 TABLET | Refills: 0 | Status: SHIPPED | OUTPATIENT
Start: 2025-02-26

## 2025-02-26 NOTE — PROGRESS NOTES
"  Palliative Clinic    Patient ID: [id number]     Chief Complaint: Follow-up      HPI:     Owen Swanson is a 44 y.o. male here today for follow up regarding symptom management and goals of care.  His primary diagnosis is stage IV colorectal cancer with metastases to the lung, liver, abdominal lymph nodes.    Symptom management:  Pain.  Previously described as lower back pain worse with sitting or standing and better with movement.  Current regimen includes tramadol 50mg [id number] PRN, occasional use of extra strength Tylenol, and frequent use of ibuprofen PRN.  Uses gabapentin rarely.  Lidoderm patches improve pain also. Pain to tailbone with sitting. Describes a tightness in chest, back, and midabdominal pain. Has previously taken tramadol BID. Pain located to seat/tailbone/posterior pelvic region. At worst pain is 7/10 and improves to 2/10 at times. Feels improvement with ibuprofen and gabapentin, not feeling improvement with Tramadol. Says he has taken 2 tablets of tramadol without improvement. Pain is waking him at night. Ran out of lidoderm patches, will refill. Discontinue Tramadol and initiate Oxycodone 5mg q6 hours PRN. Discussed to take Oxycodone separate from other sedating medications until tolerability is known.  Anxiety and depression.  Xanax 0.25 mg b.i.d. p.r.n..  Previously discontinued his Cymbalta due to drowsiness and feeling like a zombie." He is following with Oncology LCSW. Last saw her this morning and is following with her every 2-3 weeks. He feels good relief with Xanax. Although, he has been out for about a week and has noticed increase in agitation, chest tightness, and anxiety since being out of xanax. He experiences mind racing at bedtime without xanax. He also feels some relief with use of medical marijuana per the [employer]. Discussed possibility of needing to initiate maintenance medication for anxiety. He will consider this option.  Insomnia. Taking xanax, promethazine, " and gabapentin. He is using medical marijuana for anxiety and insomnia, which is helping him to sleep. He awakens during the night d/t pain.  Nausea. Felt a few times in the past week, but did not have any vomiting. Felt related to increased bloating.  Bowels. Daily bowel movement. No regimen. Monitor with addition of oxycodone.  Appetite. Consistently eating two full meals per day. Not eating as large of meals compared to previous. Weight overall stable with 2 lb weight loss since 23.    Past Medical History:   Diagnosis Date    Anxiety     Hypertension         History reviewed. No pertinent surgical history.     Social History     Tobacco Use    Smoking status: Former     Current packs/day: 0.00     Average packs/day: 0.5 packs/day for 10.0 years (5.0 ttl pk-yrs)     Types: Cigarettes     Quit date: 2016     Years since quittin.1    Smokeless tobacco: Never   Substance and Sexual Activity    Alcohol use: Never    Drug use: Yes     Types: Marijuana    Sexual activity: Yes     Partners: Female     Birth control/protection: None        Current Outpatient Medications   Medication Instructions    albuterol (PROVENTIL) 2.5 mg, Every 6 hours PRN    ALPRAZolam (XANAX) 0.25 mg, Oral, 2 times daily PRN    amLODIPine-benazepriL (LOTREL) 10-40 mg per capsule 1 capsule, Oral, Daily    ascorbic acid, vitamin C, (VITAMIN C) 1000 MG tablet 4 tablets, Nightly    cholecalciferol, vitamin D3, (VITAMIN D3) 25 mcg (1,000 unit) capsule 1 capsule, Nightly    CMPD hydrocortisone 2%- LIDOcaine 3% suppository (RECTAL ROCKET) 1 suppository, Rectal, Every 6 hours PRN, Insert 1 suppository 3/4 of the way onto rectum. Wet for easier application. Repeat for 3 nights.    cyanocobalamin (VITAMIN B-12) 100 MCG tablet 1 tablet, Nightly    DULoxetine (CYMBALTA) 30 mg, Oral, Daily    fruquintinib (FRUZAQLA) 5 mg Cap Take 1 capsule (5 mg) by mouth once daily for 21 days, then off for 7 days    gabapentin (NEURONTIN) 300 mg, Oral, Nightly  "   LIDOcaine (LIDODERM) 5 % 1 patch, Transdermal, Daily, Remove & Discard patch within 12 hours or as directed by MD    magnesium 200 mg Tab 2 tablets, Nightly    metoprolol tartrate (LOPRESSOR) 50 mg, Oral, 2 times daily    NON FORMULARY MEDICATION 2 tablets, Daily    NON FORMULARY MEDICATION 1 Piece, Daily PRN    NON FORMULARY MEDICATION Quoc Gage oil (THC oil)    omeprazole (PRILOSEC) 20 MG capsule 1 capsule, Every morning    ondansetron (ZOFRAN) 4 mg, Every 8 hours PRN    oxyCODONE (ROXICODONE) 5 mg, Oral, Every 6 hours PRN    promethazine (PHENERGAN) 25 mg, Oral, Every 6 hours PRN    zinc acetate 50 mg (zinc) Cap 1 tablet, Nightly       Review of patient's allergies indicates:  No Known Allergies     Patient Care Team:  Quinn Ledesma MD as PCP - General (Family Medicine)  Lamar Mc PharmD as Pharmacist (Pharmacist)     Subjective:     Review of Systems   Constitutional:  Positive for appetite change.   HENT: Negative.     Respiratory: Negative.     Cardiovascular: Negative.    Gastrointestinal:  Positive for nausea. Negative for constipation, diarrhea and vomiting.   Genitourinary: Negative.    Musculoskeletal:  Positive for back pain.   Neurological: Negative.    Psychiatric/Behavioral:  Positive for agitation and sleep disturbance. The patient is nervous/anxious.        12 point review of systems conducted, negative except as stated in the history of present illness. See HPI for details.    Objective:     Visit Vitals  BP (!) 148/94 (BP Location: Right arm, Patient Position: Sitting)   Pulse 67   Temp 98.5 °F (36.9 °C) (Oral)   Resp 16   Ht 5' 9" (1.753 m)   Wt 88.6 kg (195 lb 5.2 oz)   SpO2 99%   BMI 28.84 kg/m²      Vitals:    02/26/25 1013   PainSc:   2   PainLoc: Generalized       Physical Exam  Constitutional:       General: He is not in acute distress.     Appearance: He is ill-appearing.   HENT:      Head: Normocephalic.      Mouth/Throat:      Mouth: Mucous membranes are moist.   Eyes: "      Extraocular Movements: Extraocular movements intact.   Cardiovascular:      Rate and Rhythm: Normal rate and regular rhythm.      Heart sounds: Normal heart sounds. No murmur heard.  Pulmonary:      Effort: Pulmonary effort is normal. No respiratory distress.      Breath sounds: Normal breath sounds.   Abdominal:      General: There is no distension.   Musculoskeletal:      Cervical back: Normal range of motion.      Right lower leg: No edema.      Left lower leg: No edema.   Skin:     General: Skin is warm and dry.   Neurological:      General: No focal deficit present.      Mental Status: He is alert and oriented to person, place, and time.   Psychiatric:         Behavior: Behavior normal.         Review of Symptoms      Symptom Assessment (ESAS 0-10 Scale)  Pain:  0  Dyspnea:  0  Anxiety:  0  Nausea:  0  Depression:  0  Anorexia:  0  Fatigue:  0  Insomnia:  0  Restlessness:  0  Agitation:  0       Anxiety:  Is nervous/anxious  Constipation:  No constipation    Psychosocial/Cultural:   See Palliative Psychosocial Note: Yes  **Primary  to Follow**  Palliative Care  Consult: No        Labs Reviewed:     Chemistry:  Lab Results   Component Value Date     02/17/2025    K 4.2 02/17/2025    BUN 11.4 02/17/2025    CREATININE 0.79 02/17/2025    EGFRNORACEVR >60 02/17/2025    GLUCOSE 115 (H) 02/17/2025    CALCIUM 9.1 02/17/2025    ALKPHOS 349 (H) 02/17/2025    LABPROT 7.3 02/17/2025    ALBUMIN 3.4 (L) 02/17/2025    AST 76 (H) 02/17/2025     (H) 02/17/2025        Hematology:  Lab Results   Component Value Date    WBC 9.37 02/17/2025    HGB 14.8 02/17/2025    HCT 43.8 02/17/2025     02/17/2025       Urine:  Lab Results   Component Value Date    APPEARANCEUA Clear 05/28/2024    SGUA 1.027 05/28/2024    PROTEINUA 2+ (A) 12/09/2024    KETONESUA Trace (A) 05/28/2024    LEUKOCYTESUR Negative 05/28/2024    RBCUA 0-5 05/28/2024    WBCUA 0-5 05/28/2024    BACTERIA None Seen  05/28/2024    SQEPUA Trace 05/28/2024    PROTEINURINE 48.5 11/13/2024        Advanced Care Planning:     Advance Care Planning     Date: 02/26/2025    [facility]  I engaged the patient in a voluntary conversation about advance care planning and we specifically addressed what the goals of care would be moving forward, in light of the patient's change in clinical status, specifically current condition.  We did specifically address the patient's likely prognosis, which is fair .  We explored the patient's values and preferences for future care.  The patient endorses that what is most important right now is to focus on spending time at home, remaining as independent as possible, symptom/pain control, and improvement in condition but with limits to invasive therapies    Accordingly, we have decided that the best plan to meet the patient's goals includes continuing with treatment       Advance Directives:   Living Will: Yes        Copy on chart: Yes    Medical Power of : Yes      Decision Making:  Patient answered questions  Goals of Care: What is most important right now is to focus on remaining as independent as possible, symptom/pain control, quality of life, even if it means sacrificing a little time, improvement in condition but with limits to invasive therapies, comfort and QOL , He did not clarify his wishes regarding code status at this time.. Accordingly, we have decided that the best plan to meet the patient's goals includes continuing with treatment.    Goals of care:  Surrogate decision maker. HCPOA is his spouse, Marie Swanson.  ACP documents and [place].  Previously completed living will and healthcare power of .  Reviewed selections on file today.  Oncologic treatment plan. Current treatment plan includes Fruquintinib. Last seen by oncology on 2/20/25. Increased LFTs and tumor marking, therefore plan to repeat CT before next visit.    Assessment:       ICD-10-CM ICD-9-CM   1. Encounter for  palliative care  Z51.5 V66.7   2. Neoplasm related pain  G89.3 338.3   3. Colorectal cancer, stage IV  C19 154.0   4. Constipation due to opioid therapy  K59.03 564.09    T40.2X5A E935.2   5. Anxiety and depression  F41.9 300.00    F32.A 311   6. Insomnia, unspecified type  G47.00 780.52   7. Colon cancer metastasized to lung  C18.9 153.9    C78.00 197.0        Plan:     1. Encounter for palliative care  - Ongoing discussions regarding goals of care  - HCPOA and LW on file reviewed  - He is hoping to stop working a physically demanding job and focus on spending time with family and doing art/music in his remaining time, especially while he is tolerating oncologic treatments well.    2. Neoplasm related pain  - Refill LIDOcaine (LIDODERM) 5 %; Place 1 patch onto the skin once daily. Remove & Discard patch within 12 hours or as directed by MD  Dispense: 15 patch; Refill: 1  - Discontinue Tramadol and initiate oxyCODONE (ROXICODONE) 5 MG immediate release tablet; Take 1 tablet (5 mg total) by mouth every 6 (six) hours as needed for Pain.  Dispense: 60 tablet; Refill: 0  - Avoid Acetaminophen given elevated LFTs  - Attempt to decrease use of ibuprofen as tolerated    3. Colorectal cancer, stage IV  - Per hematology/oncology    4. Constipation due to opioid therapy  - Monitor for constipation with initiation of oxycodone    5. Anxiety and depression  - Refill ALPRAZolam (XANAX) 0.25 MG tablet; Take 1 tablet (0.25 mg total) by mouth 2 (two) times daily as needed for Anxiety.  Dispense: 60 tablet; Refill: 0  - Consider maintenance medication  - Medical Marijuana per [employer]    6. Insomnia, unspecified type  - Continue current regimen    [x] Discussed risks associated with narcotic use, including sedation, nausea, and constipation. Sedation precautions include: not driving or operating heavy machinery after initiating medication. Take medication with a meal or snack to prevent nausea. Discussed addition of stool  softener to prevent opioid induced constipation.    I have reviewed the narcotic prescription data via .    Follow up in about 2 weeks (around 3/12/2025) for Virtual Visit, Follow-up with NP. In addition to their scheduled follow up, the patient has also been instructed to follow up on as needed basis.    Future Appointments   Date Time Provider Department Center   3/7/2025  8:00 AM INJECTION CHAIR 01, [facility]   3/10/2025 10:00 AM Mell Pierson NP [facility] OP PAL [facility]   3/19/2025  1:00 PM LAB, [facility] OLB LAB [facility]   3/19/2025  1:30 PM [facility]   3/19/2025  2:00 PM [facility]   6/6/2025  8:00 AM INJECTION CHAIR 03, [facility] CHEMOTHERAPY INFUSION [facility] CHEMO [facility]        Mell Pierson NP

## 2025-02-26 NOTE — PROGRESS NOTES
Individual Psychotherapy (LCSW/PhD)  Owen Swanson,  2/26/2025    Site: [place]     Therapeutic Intervention: Met with patient for individual psychotherapy.    Chief complaint/reason for encounter: depression, mood swings, anger, and anxiety     Interval history and content of current session: Owen Swanson is a 44 year old male who sees Dr. Gipson in the Oncology Clinic. I met with Owen for 60 minutes today. He comes to therapy willingly and with a lot to talk about. We probably could have met for another 30 minutes. He is eager to hear options on how to handle his anger. I provided some techniques of grounding exercises and I also discussed CBT techniques emphasizing neuropsychology. Today he reports that he has been without his Xanax for 6 or 7 days. He reports he is experiencing some physical symptoms as well as agitation. He is hoping to get his prescription today. He stated he notices a difference in his life since beginning therapy. At times, he feels calmer and less angry. He suffers from physical pain due to his cancer. There continue to be struggles at home related to his step-daughter that we are addressing. He is not suicidal. He is trying to file for social security disability. We will meet again in 3 weeks.    Treatment plan:  Target symptoms: depression, anxiety , mood swings, adjustment, grief, work stress, pain  Why chosen therapy is appropriate versus another modality: relevant to diagnosis, patient responds to this modality, evidence based practice  Outcome monitoring methods: self-report, observation  Therapeutic intervention type: insight oriented psychotherapy, behavior modifying psychotherapy, supportive psychotherapy, interactive psychotherapy    Risk parameters:  Patient reports no suicidal ideation  Patient reports no homicidal ideation  Patient reports no self-injurious behavior  Patient reports no violent behavior    Verbal deficits: None    Patient's response to intervention:  The  patient's response to intervention is accepting.    Progress toward goals and other mental status changes:  The patient's progress toward goals is fair .    Diagnosis:     ICD-10-CM ICD-9-CM   1. Depression, unspecified depression type  F32.A 311   2. Anxiety about health  R45.89 799.29       Plan: Pt plans to continue individual psychotherapy    Return to clinic: 3 weeks    Length of Service (minutes): 60

## 2025-02-27 NOTE — TELEPHONE ENCOUNTER
Patient was unable to get medication and it was resent to patient's pharmacy during clinic visit on Wednesday 02/26/2025.

## 2025-03-05 NOTE — PROGRESS NOTES
Palliative Clinic    Patient ID: [id number]     Chief Complaint: Follow-up      HPI:     Owen Swanson is a 44 y.o. male presents today via telehealth for follow up regarding symptom management and goals of care with palliative medicine service.  His primary diagnosis is stage IV colorectal cancer with metastases to the lung, liver, and abdominal lymph nodes.    Symptom management:  Pain.  At previous office visit on 02/26 pain regimen was changed from tramadol 50 mg q.6 hours p.r.n. to oxycodone 5 mg q.6 hours p.r.n. in an attempt to decrease Tylenol and ibuprofen frequency given risks and elevated LFTs.  Lidoderm patches continued. He complains of stomach pains/nausea associated with taking pain medication on empty stomach, discussed to take with food. Continues to complain of pain to midback, lower back, and down to knees. Described as throbbing sensation down posterior legs to the knees, not a sharp stabbing pain. Lower back is a tightness, ache, and strong discomfort. Improves with some movement, certain positions, and slightly with heat. Unable to get lidocaine patches due to cost. Ibuprofen helps the pain and continues taking PRN. Discussed to trial Voltaren gel OTC to decrease risks associated with PO NSAIDs. Does not feel the oxycodone is really improving pain, but is creating more sedation. Was taking 2-3 doses of oxycodone per day. Pain at worst is 8-9/10 and improves to 0/10 occassionally. He is taking Gabapentin 300 mg four times per day some days and 4 tablets (1,200mg) qHS some days. Explained that this is not a PRN medication and to be most effective needs to be taken on a consistent schedule. Therefore, instructed him to take Gabapentin 300mg qAM and 600 mg qHS. Discussed to consider muscle relaxer if no improvement with other medication adjustments.  Anxiety and depression.  Continue Xanax 0.25 mg b.i.d. p.r.n..  Previously discontinued Cymbalta due to drowsiness and feeling like a zombie.    Continues to follow with Oncology LCSW and [employer] for medical marijuana. Continues to feel very anxious at times, but has improved symptoms on current regimen. He is taking xanax BID. Decreased agitation with this regimen. He is following with LCSW and states she is helping him to learn improved coping mechanisms. Explained that we will give him time on current regimen to improve coping mechanisms, however will plan to initiate maintenance medication regimen if he continues to require xanax BID at next office visit. He is agreeable.  Insomnia.  Taking Xanax, promethazine, and gabapentin.  Additionally utilizing medical marijuana.  Reports good control. Unless waking up from pain which occurs occasionally. Usually able to return to sleep with medical marijuana.  Nausea.  Previously without vomiting. Some nausea with oxycodone when not taking with food. Educated not to take pain medication on empty stomach.  Bowels.  Daily bowel movement.  No regimen.  Appetite.  Overall stable weight and reported decent appetite previously.    Past Medical History:   Diagnosis Date    Anxiety     Hypertension         History reviewed. No pertinent surgical history.     Social History     Tobacco Use    Smoking status: Former     Current packs/day: 0.00     Average packs/day: 0.5 packs/day for 10.0 years (5.0 ttl pk-yrs)     Types: Cigarettes     Quit date: 2016     Years since quittin.2    Smokeless tobacco: Never   Substance and Sexual Activity    Alcohol use: Never    Drug use: Yes     Types: Marijuana    Sexual activity: Yes     Partners: Female     Birth control/protection: None        Current Outpatient Medications   Medication Instructions    albuterol (PROVENTIL) 2.5 mg, Every 6 hours PRN    ALPRAZolam (XANAX) 0.25 mg, Oral, 2 times daily PRN    amLODIPine-benazepriL (LOTREL) 10-40 mg per capsule 1 capsule, Oral, Daily    ascorbic acid, vitamin C, (VITAMIN C) 1000 MG tablet 4 tablets, Nightly     cholecalciferol, vitamin D3, (VITAMIN D3) 25 mcg (1,000 unit) capsule 1 capsule, Nightly    CMPD hydrocortisone 2%- LIDOcaine 3% suppository (RECTAL ROCKET) 1 suppository, Rectal, Every 6 hours PRN, Insert 1 suppository 3/4 of the way onto rectum. Wet for easier application. Repeat for 3 nights.    cyanocobalamin (VITAMIN B-12) 100 MCG tablet 1 tablet, Nightly    DULoxetine (CYMBALTA) 30 mg, Oral, Daily    fruquintinib (FRUZAQLA) 5 mg Cap Take 1 capsule (5 mg) by mouth once daily for 21 days, then off for 7 days    gabapentin (NEURONTIN) 300 mg, Oral, Nightly    LIDOcaine (LIDODERM) 5 % 1 patch, Transdermal, Daily, Remove & Discard patch within 12 hours or as directed by MD    magnesium 200 mg Tab 2 tablets, Nightly    metoprolol tartrate (LOPRESSOR) 50 mg, Oral, 2 times daily    NON FORMULARY MEDICATION 2 tablets, Daily    NON FORMULARY MEDICATION 1 Piece, Daily PRN    NON FORMULARY MEDICATION Quoc Gage oil (THC oil)    omeprazole (PRILOSEC) 20 MG capsule 1 capsule, Every morning    ondansetron (ZOFRAN) 4 mg, Every 8 hours PRN    oxyCODONE (ROXICODONE) 5 mg, Oral, Every 6 hours PRN    promethazine (PHENERGAN) 25 mg, Oral, Every 6 hours PRN    zinc acetate 50 mg (zinc) Cap 1 tablet, Nightly       Review of patient's allergies indicates:  No Known Allergies     Patient Care Team:  Quinn Ledesma MD as PCP - General (Family Medicine)  Lamar Mc PharmD as Pharmacist (Pharmacist)     Subjective:     Review of Systems   Constitutional:  Positive for appetite change and fatigue.   HENT: Negative.     Respiratory: Negative.     Cardiovascular: Negative.    Gastrointestinal:  Positive for abdominal pain and vomiting. Negative for constipation, diarrhea and nausea.   Genitourinary: Negative.    Musculoskeletal:  Positive for arthralgias and back pain.   Neurological: Negative.    Psychiatric/Behavioral:  Positive for agitation. Negative for sleep disturbance. The patient is nervous/anxious.        12 point  review of systems conducted, negative except as stated in the history of present illness. See HPI for details.    Objective:     There were no vitals taken for this visit. There were no vitals filed for this visit.    Physical Exam  Constitutional:       General: He is not in acute distress.     Appearance: He is ill-appearing.   HENT:      Mouth/Throat:      Mouth: Mucous membranes are moist.   Eyes:      Extraocular Movements: Extraocular movements intact.   Pulmonary:      Effort: No respiratory distress.   Neurological:      Mental Status: He is alert and oriented to person, place, and time.   Psychiatric:         Behavior: Behavior normal.         Review of Symptoms      Symptom Assessment (ESAS 0-10 Scale)  Pain:  0  Dyspnea:  0  Anxiety:  0  Nausea:  0  Depression:  0  Anorexia:  0  Fatigue:  0  Insomnia:  0  Restlessness:  0  Agitation:  0       Anxiety:  Is nervous/anxious  Constipation:  No constipation    Bowel Management Plan (BMP):  Yes      Psychosocial/Cultural:   See Palliative Psychosocial Note: Yes  **Primary  to Follow**  Palliative Care  Consult: No        Labs Reviewed:     Chemistry:  Lab Results   Component Value Date     02/17/2025    K 4.2 02/17/2025    BUN 11.4 02/17/2025    CREATININE 0.79 02/17/2025    EGFRNORACEVR >60 02/17/2025    GLUCOSE 115 (H) 02/17/2025    CALCIUM 9.1 02/17/2025    ALKPHOS 349 (H) 02/17/2025    LABPROT 7.3 02/17/2025    ALBUMIN 3.4 (L) 02/17/2025    AST 76 (H) 02/17/2025     (H) 02/17/2025        Hematology:  Lab Results   Component Value Date    WBC 9.37 02/17/2025    HGB 14.8 02/17/2025    HCT 43.8 02/17/2025     02/17/2025       Urine:  Lab Results   Component Value Date    APPEARANCEUA Clear 05/28/2024    SGUA 1.027 05/28/2024    PROTEINUA 2+ (A) 12/09/2024    KETONESUA Trace (A) 05/28/2024    LEUKOCYTESUR Negative 05/28/2024    RBCUA 0-5 05/28/2024    WBCUA 0-5 05/28/2024    BACTERIA None Seen 05/28/2024    SQEPUA  "Trace 05/28/2024    PROTEINURINE 48.5 11/13/2024        Advanced Care Planning:     Advance Care Planning     Date: 03/05/2025    [facility]  I engaged the patient in a voluntary conversation about advance care planning and we specifically addressed what the goals of care would be moving forward, in light of the patient's change in clinical status, specifically current condition.  We did specifically address the patient's likely prognosis, which is fair .  We explored the patient's values and preferences for future care.  The patient endorses that what is most important right now is to focus on remaining as independent as possible, symptom/pain control, and improvement in condition but with limits to invasive therapies    Accordingly, we have decided that the best plan to meet the patient's goals includes continuing with treatment       Advance Directives:   Living Will: Yes        Copy on chart: Yes    Medical Power of : Yes      Decision Making:  Patient answered questions  Goals of Care: What is most important right now is to focus on spending time at home, remaining as independent as possible, symptom/pain control, improvement in condition but with limits to invasive therapies. Accordingly, we have decided that the best plan to meet the patient's goals includes continuing with treatment.    Goals of care:  Surrogate decision maker.  HCPOA is his spouse, Marie Swanson.  ACP documents and [place].  Living will selections include "that life-sustaining procedures, except nutrition and hydration, be withheld or withdrawn so that food and water can be administered invasively."  Oncologic treatment plan.  Current treatment plan includes Fruquintinib.  Due to increased LFTs and tumor markers, imaging is planned for 3/12 with Oncology follow up on 3/19. He plans to continue available treatment options.    Assessment:       ICD-10-CM ICD-9-CM   1. Encounter for palliative care  Z51.5 V66.7   2. Neoplasm related pain  " G89.3 338.3   3. Anxiety and depression  F41.9 300.00    F32.A 311   4. Colorectal cancer, stage IV  C19 154.0        Plan:   1. Encounter for palliative care  - Ongoing discussions regarding goals of care  - Further discussions to follow upcoming repeat imaging  - HCPOA and LW on file  - Continued discussions regarding [place]    2. Neoplasm related pain  - Continue Ibuprofen PRN, although recommendation is to take only sparingly  - Trial Voltaren gel  - Discussed with office staff to call pharmacy for pricing and insurance coverage options for lidocaine patches  - Educated to take Gabapentin on consistent regimen of 300mg qAM and 600mg qHS  - Continue Oxycodone 5mg l2qmpco PRN as breakthrough pain medication  - Discussed trial of less sedating muscle relaxer if no improvement in pain by next office visit    3. Anxiety and depression  - Continue Xanax 0.25 mg BID PRN  - Continue with LCSW for counseling and improved coping mechanisms  - If he continues to require xanax BID scheduled at next office visit, will plan to initiate maintenance medication regimen    4. Colorectal cancer, stage IV  - Per hematology/oncology recommendations  - Has upcoming imaging with close oncology follow up      [x] Discussed risks associated with narcotic use, including sedation, nausea, and constipation. Sedation precautions include: not driving or operating heavy machinery after initiating medication. Take medication with a meal or snack to prevent nausea. Discussed addition of stool softener to prevent opioid induced constipation.    I have reviewed the narcotic prescription data via .    Follow up in about 2 weeks (around 3/24/2025) for Follow-up with NP. In addition to their scheduled follow up, the patient has also been instructed to follow up on as needed basis.    Audiovisual visit completed with patient at primary residence and provider at [facility] in [place], [place].    Future Appointments   Date Time  Provider Department [place]   3/19/2025  1:00 [facility]   3/19/2025  1:30 PM [facility]   3/19/2025  2:00 PM Lucian Henry LCSW [facility]   3/24/2025 11:00 AM Mell Pierson NP [facility] OP PAL [facility]   6/6/2025  8:00 AM INJECTION CHAIR 03, [facility] CHEMOTHERAPY INFUSION [facility] CHEMO [facility]        Mell Pierson NP

## 2025-03-07 ENCOUNTER — INFUSION (OUTPATIENT)
Dept: INFUSION THERAPY | Facility: HOSPITAL | Age: 45
End: 2025-03-07
Attending: INTERNAL MEDICINE
Payer: COMMERCIAL

## 2025-03-07 DIAGNOSIS — C20 RECTAL CANCER: Primary | ICD-10-CM

## 2025-03-07 PROCEDURE — 25000003 PHARM REV CODE 250: Performed by: INTERNAL MEDICINE

## 2025-03-07 PROCEDURE — 63600175 PHARM REV CODE 636 W HCPCS: Performed by: INTERNAL MEDICINE

## 2025-03-07 PROCEDURE — A4216 STERILE WATER/SALINE, 10 ML: HCPCS | Performed by: INTERNAL MEDICINE

## 2025-03-07 PROCEDURE — 96523 IRRIG DRUG DELIVERY DEVICE: CPT

## 2025-03-07 RX ORDER — SODIUM CHLORIDE 0.9 % (FLUSH) 0.9 %
10 SYRINGE (ML) INJECTION
Status: DISCONTINUED | OUTPATIENT
Start: 2025-03-07 | End: 2025-03-07 | Stop reason: HOSPADM

## 2025-03-07 RX ORDER — SODIUM CHLORIDE 0.9 % (FLUSH) 0.9 %
10 SYRINGE (ML) INJECTION
OUTPATIENT
Start: 2025-05-30

## 2025-03-07 RX ORDER — HEPARIN 100 UNIT/ML
500 SYRINGE INTRAVENOUS
Status: DISCONTINUED | OUTPATIENT
Start: 2025-03-07 | End: 2025-03-07 | Stop reason: HOSPADM

## 2025-03-07 RX ORDER — HEPARIN 100 UNIT/ML
500 SYRINGE INTRAVENOUS
OUTPATIENT
Start: 2025-05-30

## 2025-03-07 RX ADMIN — HEPARIN 500 UNITS: 100 SYRINGE at 08:03

## 2025-03-07 RX ADMIN — SODIUM CHLORIDE, PRESERVATIVE FREE 10 ML: 5 INJECTION INTRAVENOUS at 08:03

## 2025-03-10 ENCOUNTER — OFFICE VISIT (OUTPATIENT)
Dept: PALLIATIVE MEDICINE | Facility: CLINIC | Age: 45
End: 2025-03-10
Payer: COMMERCIAL

## 2025-03-10 DIAGNOSIS — F32.A ANXIETY AND DEPRESSION: ICD-10-CM

## 2025-03-10 DIAGNOSIS — G89.3 NEOPLASM RELATED PAIN: ICD-10-CM

## 2025-03-10 DIAGNOSIS — Z51.5 ENCOUNTER FOR PALLIATIVE CARE: Primary | ICD-10-CM

## 2025-03-10 DIAGNOSIS — F41.9 ANXIETY AND DEPRESSION: ICD-10-CM

## 2025-03-10 DIAGNOSIS — C19 COLORECTAL CANCER, STAGE IV: ICD-10-CM

## 2025-03-12 NOTE — PROGRESS NOTES
Subjective:       Patient ID: Owen Swanson is a 44 y.o. male.    Rectal Cancer Stage IV--Diagnosed 23  Biopsy/pathology:   1. Colonoscopy 23--large fungating rectal mass 5-10cm from anal verge to approximately 20cm, biopsy with well differentiated colorectal adenocarcinoma.   2. Bronchoscopy with biopsy of endobronchial mass RUL biopsy done 3/16/23--metastatic poorly differentiated colonic adenocarcinoma. Foundation One with PD-L1 0%, ALK rearrangement, KRAS G12V, AKT2 amplification, APCV, FBXW7, CCND3 amplification, TP53, VEGFA amplification, TMB 8, MSI-high not detected, elevated tumor fraction.   3. Liquid Biopsy Guardant 360 done 24--KRAS G12V, APC , TP53 R248W, MSI high not detected, NRAS, BRAF, Her2 and NTRK negative.  Guardant 360 liquid biopsy done 24--KRAS G12V, APC and TP53 mutations.   Imagin. PET/CT 24--interval enlargement and increased metabolic activity of primary rectal mass, c/w local disease progression, stable hypermetabolic hepatic metastatic disease, grossly stable infiltrating tumor in RUL bronchus, suspected subtle interval enlargement of subcentimeter nodule in TIGRE and RLL.   2. CT C/A/P w/ contrast done at [facility] 3/18/24--Similar to slight increase in size of left upper and right lower lobe pulmonary nodules. Grossly similar right upper lobe peribronchial lesion and nodularity, similar to slight increase in size of conglomerate maria isabel hepatic and peripancreatic lymphadenopathy, grossly similar metastatic liver lesions.   3. PET/CT 24--Findings concerning for progression of primary rectal mass and metastatic disease.   4. CT A/P done 24--long segment rectal wall thickening and nodularity compatible with known rectal malignancy, multiple calcified abdominopelvic lymph nodes compatible with metastatic nodes. Reference portal caval lymph node measures 2.4 cm short axis, Multiple bilobar hypodense, variably calcified hepatic masses compatible with  colorectal metastases. Largest discrete lesion in the right lobe measures approximately 8.7 cm, findings similar to prior.   5. CT C/A/P done 7/24/24--progressive pulmonary metastatic disease, bulky branching endobronchial tumor anterior RUL, appears enlarged but difficult to measure, 4.5cm (prior 2.5cm), nodule at TIGRE measures 10mm (prior 7mm), nodule RLL measures 16mm (prior 12mm), progressive hepatic metastatic disease, few lesions enlarged two in hepatic segment VI measure 2.6 and 4.4cm (prior 1.9 and 4cm), majority appear stable, large pathologic lymph nodes in maria isabel hepatis have progressed, for example LN anterior to celiac artery measures 2.8cm vs. Prior 2.3cm, new 2cm splenic lesion, potentially metastatic, large rectal mass 8cm, similar, invading the mesorectal fat, grossly stable partially calcified metastatic lymph nodes in mesorectal fat and retroperitoneum.   6. CT C/A/P done 10/9/24--Areas of endobronchial soft tissue in the right upper lobe have similar overall size, solid nodule in the right lower lobe measures 17 mm, previously 16 mm. The left upper lobe nodule measures 13 mm, previously 10 mm. No definitive new suspicious nodule. Stable 11 mm right lower paratracheal lymph node. Liver lesions and periportal lymph nodes have similar sizes since July, although with mild decrease in overall attenuation.  Suspect this relates to post treatment effect. Slightly improved rectal wall thickening.  The splenic hypodensity is smaller.  7. CT C/A/P done 1/14/25--Right more advanced than left pulmonary metastatic disease is similar in appearance with no new metastatic disease. Extensive hepatic metastatic disease and abdominopelvic adenopathy is all similar, chronic circumferential rectal wall thickening c/w known primary malignancy is unchanged.  8.  CT C/A/P on 3/12/25--Grossly stable pulmonary metastasis, with a dominant 8 cm diameter lesion in the anterior right upper lobe obstructing the right upper lobe  anterior bronchus. Minimal progression of hepatic metastatic disease, with at least 1 hepatic metastatic lesion appearing significantly enlarged in the interval. Grossly stable partially calcified mariya metastasis throughout the abdomen and pelvis, including at the maria isabel hepatis. Grossly stable rectal mass, measuring at least 8 cm in length. Moderate lower lumbar spine disc disease.    Treatment history:  FOLFOX/Avastin 2/22/23 followed by maintenance.  Progression in 11/2023.   FOLFIRI/Avastin 11/2023--4/29/24 (stopped due to progression).  Alectinib 5/22/24--7/26/24 (stopped due to progression).  Fruquintinib 5mg daily X 21 days on/7 off. Started on 8/5/24.   Cycle 9 started on 3/17/25. Patient to stop once received word from [facility] (progression).    CEA:   05/09/24--904  06/12/24--1,131.53  07/01/24--1,191.49  07/16/24--1,496.72  08/19/24--1,073.40  09/05/24--1,349.70  09/19/24--907.29  10/01/24--1,424.78  10/17/24--914.90  11/14/24--922.8  12/09/24--825.35  01/09/25--786.91  02/17/25--1,286.57    Current treatment plan: Clinical trial at [facility]       Chief Complaint: Abdominal Pain (Pt feels like there is a new tumor in his R lung. ), Back Pain, Pain, Nausea, Constipation, Bloated, and Other Misc (Pt has an appt with Lucian after OV with Angel.)    HPI  Patient presents for follow-up of Stage IV rectal cancer. He has not been having many good days. Reports having abdominal pain and worsening back pain. Recent imaging shows minimal progression to his hepatic disease, stable disease elsewhere. Scans also show moderate lower lumbar spine disc disease. He is still followed by pain management but is trying not to take as many medications. He is taking Oxycodone, Gabapentin and marijuana which does help to relax him to fall asleep. We discussed returning to [facility] for clinical trial options since they recommended returning at progression. He is in agreement.     Past Medical History:   Diagnosis Date    Anxiety      Hypertension       History reviewed. No pertinent surgical history.  Family History   Problem Relation Name Age of Onset    Hypertension Mother      Coronary artery disease Father Bora Swanson Jr.     Hypertension Father Bora Swanson Jr.     Diabetes Father Bora Swanson Jr.     Cancer Father Bora Swanson Jr.      Social History     Socioeconomic History    Marital status:    Tobacco Use    Smoking status: Former     Current packs/day: 0.00     Average packs/day: 0.5 packs/day for 10.0 years (5.0 ttl pk-yrs)     Types: Cigarettes     Quit date: 2016     Years since quittin.2    Smokeless tobacco: Never    Tobacco comments:     Pt smokes marijuana   Substance and Sexual Activity    Alcohol use: Never    Drug use: Yes     Types: Marijuana    Sexual activity: Yes     Partners: Female     Birth control/protection: None     Social Drivers of Health     Financial Resource Strain: High Risk (3/10/2025)    Overall Financial Resource Strain (CARDIA)     Difficulty of Paying Living Expenses: Very hard   Food Insecurity: Food Insecurity Present (3/10/2025)    Hunger Vital Sign     Worried About Running Out of Food in the Last Year: Often true     Ran Out of Food in the Last Year: Often true   Transportation Needs: No Transportation Needs (3/10/2025)    PRAPARE - Transportation     Lack of Transportation (Medical): No     Lack of Transportation (Non-Medical): No   Physical Activity: Insufficiently Active (3/10/2025)    Exercise Vital Sign     Days of Exercise per Week: 2 days     Minutes of Exercise per Session: 30 min   Stress: Stress Concern Present (3/10/2025)    [employer] [place] [employer] - Occupational Stress Questionnaire     Feeling of Stress : Rather much   Housing Stability: High Risk (3/10/2025)    Housing Stability Vital Sign     Unable to Pay for Housing in the Last Year: Yes     Number of Times Moved in the Last Year: 0     Homeless in the Last Year: No       Review of  patient's allergies indicates:  No Known Allergies   Current Outpatient Medications on File Prior to Visit   Medication Sig Dispense Refill    albuterol (PROVENTIL) 2.5 mg /3 mL (0.083 %) nebulizer solution Inhale 2.5 mg into the lungs every 6 (six) hours as needed.      ALPRAZolam (XANAX) 0.25 MG tablet Take 1 tablet (0.25 mg total) by mouth 2 (two) times daily as needed for Anxiety. 60 tablet 0    amLODIPine-benazepriL (LOTREL) 10-40 mg per capsule Take 1 capsule by mouth once daily. 30 capsule 3    ascorbic acid, vitamin C, (VITAMIN C) 1000 MG tablet Take 4 tablets by mouth every evening.      cholecalciferol, vitamin D3, (VITAMIN D3) 25 mcg (1,000 unit) capsule Take 1 capsule by mouth every evening.      cyanocobalamin (VITAMIN B-12) 100 MCG tablet Take 1 tablet by mouth every evening.      fruquintinib (FRUZAQLA) 5 mg Cap Take 1 capsule (5 mg) by mouth once daily for 21 days, then off for 7 days 21 capsule 6    gabapentin (NEURONTIN) 300 MG capsule Take 1 capsule (300 mg total) by mouth every evening. 30 capsule 2    magnesium 200 mg Tab Take 2 tablets by mouth every evening.      metoprolol tartrate (LOPRESSOR) 50 MG tablet Take 1 tablet (50 mg total) by mouth 2 (two) times daily. 60 tablet 3    NON FORMULARY MEDICATION Take 2 tablets by mouth Daily. Iron blood builder      NON FORMULARY MEDICATION Take 1 Piece by mouth daily as needed (pain). THC gummies. Patient stated 1 piece or less per day.      NON FORMULARY MEDICATION Quoc Gage oil (THC oil)      omeprazole (PRILOSEC) 20 MG capsule Take 1 capsule by mouth every morning.      ondansetron (ZOFRAN) 4 MG tablet Take 4 mg by mouth every 8 (eight) hours as needed.      promethazine (PHENERGAN) 25 MG tablet Take 1 tablet (25 mg total) by mouth every 6 (six) hours as needed for Nausea. 34 tablet 2    zinc acetate 50 mg (zinc) Cap Take 1 tablet by mouth every evening.      CMPD hydrocortisone 2%- LIDOcaine 3% suppository (RECTAL ROCKET) Place 1 suppository  "rectally every 6 (six) hours as needed (anorectal pain). Insert 1 suppository 3/4 of the way onto rectum. Wet for easier application. Repeat for 3 nights. (Patient not taking: Reported on 3/19/2025) 12 applicator 1    DULoxetine (CYMBALTA) 30 MG capsule Take 1 capsule (30 mg total) by mouth once daily. (Patient not taking: Reported on 3/19/2025) 30 capsule 1    LIDOcaine (LIDODERM) 5 % Place 1 patch onto the skin once daily. Remove & Discard patch within 12 hours or as directed by MD (Patient not taking: Reported on 3/19/2025) 15 patch 1    oxyCODONE (ROXICODONE) 5 MG immediate release tablet Take 1 tablet (5 mg total) by mouth every 6 (six) hours as needed for Pain. (Patient not taking: Reported on 3/19/2025) 60 tablet 0     No current facility-administered medications on file prior to visit.      Review of Systems   Constitutional:  Positive for appetite change and fatigue. Unexpected weight change: weight fluctuates.  HENT:  Negative for mouth sores.    Eyes:  Negative for visual disturbance.   Respiratory:  Positive for cough. Negative for shortness of breath.    Cardiovascular:  Negative for chest pain.   Gastrointestinal:  Positive for abdominal pain and constipation. Negative for diarrhea.   Genitourinary:  Positive for frequency.   Musculoskeletal:  Positive for back pain.   Integumentary:  Negative for rash.   Neurological:  Negative for headaches.   Hematological:  Negative for adenopathy.   Psychiatric/Behavioral:  The patient is nervous/anxious.          Vitals:    03/19/25 1320   BP: (!) 141/93   Pulse: 68   Resp: 14   Temp: 98.5 °F (36.9 °C)   TempSrc: Oral   SpO2: 100%   Weight: 89.7 kg (197 lb 12.8 oz)   Height: 5' 9" (1.753 m)     Wt Readings from Last 3 Encounters:   03/19/25 1320 89.7 kg (197 lb 12.8 oz)   02/26/25 1013 88.6 kg (195 lb 5.2 oz)   02/17/25 1303 91.2 kg (201 lb 1.6 oz)     Physical Exam  Constitutional:       General: He is awake.      Appearance: Normal appearance. He is overweight. " He is ill-appearing.   HENT:      Head: Normocephalic and atraumatic.      Nose: Nose normal.      Mouth/Throat:      Mouth: Mucous membranes are moist.   Eyes:      General: Vision grossly intact.      Extraocular Movements: Extraocular movements intact.      Conjunctiva/sclera: Conjunctivae normal.   Pulmonary:      Effort: Pulmonary effort is normal.   Chest:      Comments: Left chest wall mediport in place  Abdominal:      General: Abdomen is flat. Bowel sounds are normal. There is no distension.      Palpations: Abdomen is soft.   Musculoskeletal:      Cervical back: Neck supple.      Right lower leg: No edema.      Left lower leg: No edema.   Lymphadenopathy:      Cervical: No cervical adenopathy.      Upper Body:      Right upper body: No supraclavicular adenopathy.      Left upper body: No supraclavicular adenopathy.   Neurological:      Mental Status: He is alert and oriented to person, place, and time.      Motor: Motor function is intact.   Psychiatric:         Mood and Affect: Mood normal.         Speech: Speech normal.         Behavior: Behavior is cooperative.         Judgment: Judgment normal.       Lab Visit on 03/19/2025   Component Date Value Ref Range Status    Protein, UA 03/19/2025 3+ (A)  Negative Final    WBC 03/19/2025 7.07  4.50 - 11.50 x10(3)/mcL Final    RBC 03/19/2025 4.75  4.70 - 6.10 x10(6)/mcL Final    Hgb 03/19/2025 14.5  14.0 - 18.0 g/dL Final    Hct 03/19/2025 43.3  42.0 - 52.0 % Final    MCV 03/19/2025 91.2  80.0 - 94.0 fL Final    MCH 03/19/2025 30.5  27.0 - 31.0 pg Final    MCHC 03/19/2025 33.5  33.0 - 36.0 g/dL Final    RDW 03/19/2025 13.0  11.5 - 17.0 % Final    Platelet 03/19/2025 187  130 - 400 x10(3)/mcL Final    MPV 03/19/2025 10.9 (H)  7.4 - 10.4 fL Final    Neut % 03/19/2025 70.2  % Final    Lymph % 03/19/2025 17.1  % Final    Mono % 03/19/2025 9.3  % Final    Eos % 03/19/2025 3.0  % Final    Basophil % 03/19/2025 0.3  % Final    Imm Grans % 03/19/2025 0.1  % Final     Neut # 03/19/2025 4.96  2.1 - 9.2 x10(3)/mcL Final    Lymph # 03/19/2025 1.21  0.6 - 4.6 x10(3)/mcL Final    Mono # 03/19/2025 0.66  0.1 - 1.3 x10(3)/mcL Final    Eos # 03/19/2025 0.21  0 - 0.9 x10(3)/mcL Final    Baso # 03/19/2025 0.02  <=0.2 x10(3)/mcL Final    Imm Gran # 03/19/2025 0.01  0.00 - 0.04 x10(3)/mcL Final         Assessment:       1. Rectal cancer    2. Secondary malignant neoplasm of liver    3. 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 Malignant neoplasm metastatic to both lungs    5. 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 KRAS mutated.  Patient has failed FOLFOX avastin and FOLFIRI avastin.  Most Recent PET from 4/29/24 shows disease progression and CEA rising.    In review of previous Foundation One testing, noted to have an ALK rearrangement.   I did a literature review and there have been documented responses in colon cancers treatment with ALK inhibitors.   Case presented at [facility] clinical trials tumor board. No current clinical trials available, but treatment recommended with Alectinib.   Case presented at molecular tumor board on 6/4/24, agreed with trial of Alectinib.    Discussed conventional treatment with 3rd line Stivarga vs. Lonsurf which have not historically had good outcomes. Also discussed incurable nature of disease and continued palliative goals of treatment.  Would favor a more aggressive approach to treatment given his excellent functional status and young age. Did not think Keytruda or immunotherapy would be effective given JESUS.     Patient started Alectinib 600mg PO BID on 5/22/24.  CT done 7/24/24 showed some stable disease, but mostly progression.  Recommend to change treatment to Fruquintinib per [facility].   Received 2 units PRBC on 7/29/24 for worsening anemia.    Patient started Fruquintinib 5mg daily X  21 days on/7 days off on 8/5/24.   Patient continues tolerating well.   CT C/A/P done 1/14/25 shows stable disease.    Repeat CT C/A/P last week shows minimal progression of hepatic metastatic disease, stable disease elsewhere.   C9 started on 3/17/25.  CBC today is good. CMP and CEA are pending. CEA has been increasing.   Based on his scan findings, Dr. Gipson recommends he return to [facility] since they have identified 4 trials that are currently available to him.   Will notify our patient navigator to help facilitate this.   I did suggest he stop the Fruquintinib once he has an appointment established at [facility] since they will likely need him to be off in order to begin the clinical trial.   Will also send referral to Dr. Bennett Freitas, PMR for options for his moderate degenerative disc disease seen in recent CT scan.   Baclofen TID as needed for muscle spasms will be called in to his pharmacy.   Refill for Phenergan called in to pharmacy as well.   Will have him follow-up in 3-4 weeks with labs to keep up with his progress. We can always see him sooner if needed.     Increased Metoprolol to 50mg PO BID at his last appointment If BP continues to be high, can change Lotrel to higher dose of 10/40 daily.   Patient will continue follow-up with palliative care for pain management, insomnia and anxiety management.   Continue supplements with Carlos Alberto Reynoso--Boost Breeze or Ensure Clear supplements.   Patient has no FH of any cancer, may need genetic testing due to the APC mutation.    Seen at [facility] for clinical trial options and if progressive disease on next scans, they identified the following trial options for him:  1. 2022-0276, [facility] [place] [facility], [place]: Chantal  2. 2020-1340 RP-[id number] (PKMYT1 kinase inhibitor), [place]: Edgar Patricia  3. 3625-0920: APR-[id number] (Wee1 inhibitor). [place]: Lalitha Christianson  4. FRANCESCO trial with Dr. Viki Frank     All questions answered at this time.      Angel Bailey, [facility]    Visit today included increased  complexity associated with the care of the episodic problem related to treatment side effects, also addressed and managed the longitudinal care of the patient's metastatic rectal cancer.    Prior to the patient's arrival on the same day, I spent (5) minutes reviewing chart. Once in the exam room with the patient, I spent (20) minutes in the room with the member performing a history and exam as well as reviewing the test results and recommendations with the patient. After leaving the exam room, I spent an additional (5) minutes completing the electronic health record. The total time spent that day making medical decisions for the patient is (30) minutes, and this time - including the breakdown - is documented in the medical record.

## 2025-03-13 ENCOUNTER — TELEPHONE (OUTPATIENT)
Dept: PALLIATIVE MEDICINE | Facility: CLINIC | Age: 45
End: 2025-03-13
Payer: COMMERCIAL

## 2025-03-13 NOTE — TELEPHONE ENCOUNTER
"Patient had an appointment with provider Mell Pierson on Monday March 10,25. Patient reported to provider that he was previously prescribed Lidocaine 5% patch, however it's requiring him to come out of pocket. Provider asked to follow up with patient's pharmacy for more details. I called patient's [facility] pharmacy, I asked pharmacist if this  prescription is requiring a PA? Pharmacist states" Patient's insurance does cover the patches, however the out pocket price of $63.58 is because he has a deductible that has not been meet. So until his deductible is meet he would have to pay out of pocket each time." I asked Pharmacist if they would know of a different patch that would be of a less of a cost to the patient. Pharmacist states" No, I'm sorry I don't know which patch would have a less of a outer pocket cost." Please Advise.   "

## 2025-03-18 NOTE — TELEPHONE ENCOUNTER
Good Rx prices are more expensive. Please notify patient that this is a deductible/insurance issue. He can ask pharmacy for cash price without insurance coverage or if there is an over the counter alternative that is cheaper.

## 2025-03-18 NOTE — TELEPHONE ENCOUNTER
Patient called clinic back. I informed patient of information that was received from the pharmacy along with Np Dangelo recommendation. Patient verbalized understanding and expressed gratitude.

## 2025-03-18 NOTE — TELEPHONE ENCOUNTER
I called patient and no answer. Patient VM is full, I will try calling patient again later on today.

## 2025-03-19 ENCOUNTER — OFFICE VISIT (OUTPATIENT)
Dept: HEMATOLOGY/ONCOLOGY | Facility: CLINIC | Age: 45
End: 2025-03-19
Payer: COMMERCIAL

## 2025-03-19 ENCOUNTER — LAB VISIT (OUTPATIENT)
Dept: LAB | Facility: HOSPITAL | Age: 45
End: 2025-03-19
Attending: INTERNAL MEDICINE
Payer: COMMERCIAL

## 2025-03-19 VITALS
OXYGEN SATURATION: 100 % | BODY MASS INDEX: 29.3 KG/M2 | DIASTOLIC BLOOD PRESSURE: 93 MMHG | WEIGHT: 197.81 LBS | TEMPERATURE: 99 F | SYSTOLIC BLOOD PRESSURE: 141 MMHG | HEART RATE: 68 BPM | HEIGHT: 69 IN | RESPIRATION RATE: 14 BRPM

## 2025-03-19 DIAGNOSIS — R11.0 NAUSEA: ICD-10-CM

## 2025-03-19 DIAGNOSIS — C20 RECTAL CANCER: Primary | ICD-10-CM

## 2025-03-19 DIAGNOSIS — C78.02 MALIGNANT NEOPLASM METASTATIC TO BOTH LUNGS: ICD-10-CM

## 2025-03-19 DIAGNOSIS — C78.7 SECONDARY MALIGNANT NEOPLASM OF LIVER: ICD-10-CM

## 2025-03-19 DIAGNOSIS — M51.360 DEGENERATION OF INTERVERTEBRAL DISC OF LUMBAR REGION WITH DISCOGENIC BACK PAIN: ICD-10-CM

## 2025-03-19 DIAGNOSIS — F32.A DEPRESSION, UNSPECIFIED DEPRESSION TYPE: Primary | ICD-10-CM

## 2025-03-19 DIAGNOSIS — C78.01 MALIGNANT NEOPLASM METASTATIC TO BOTH LUNGS: ICD-10-CM

## 2025-03-19 DIAGNOSIS — R45.89 ANXIETY ABOUT HEALTH: ICD-10-CM

## 2025-03-19 DIAGNOSIS — C77.8 MALIGNANT NEOPLASM METASTATIC TO LYMPH NODES OF MULTIPLE SITES: ICD-10-CM

## 2025-03-19 DIAGNOSIS — C20 RECTAL CANCER: ICD-10-CM

## 2025-03-19 DIAGNOSIS — G89.3 CANCER-RELATED BREAKTHROUGH PAIN: ICD-10-CM

## 2025-03-19 DIAGNOSIS — D84.821 IMMUNODEFICIENCY DUE TO DRUGS: ICD-10-CM

## 2025-03-19 DIAGNOSIS — Z79.899 IMMUNODEFICIENCY DUE TO DRUGS: ICD-10-CM

## 2025-03-19 LAB
ALBUMIN SERPL-MCNC: 3.6 G/DL (ref 3.5–5)
ALBUMIN/GLOB SERPL: 0.8 RATIO (ref 1.1–2)
ALP SERPL-CCNC: 479 UNIT/L (ref 40–150)
ALT SERPL-CCNC: 90 UNIT/L (ref 0–55)
ANION GAP SERPL CALC-SCNC: 6 MEQ/L
AST SERPL-CCNC: 64 UNIT/L (ref 5–34)
BASOPHILS # BLD AUTO: 0.02 X10(3)/MCL
BASOPHILS NFR BLD AUTO: 0.3 %
BILIRUB SERPL-MCNC: 0.6 MG/DL
BUN SERPL-MCNC: 11.4 MG/DL (ref 8.9–20.6)
CALCIUM SERPL-MCNC: 9.8 MG/DL (ref 8.4–10.2)
CEA SERPL-MCNC: 1099.21 NG/ML (ref 0–3)
CHLORIDE SERPL-SCNC: 102 MMOL/L (ref 98–107)
CO2 SERPL-SCNC: 28 MMOL/L (ref 22–29)
CREAT SERPL-MCNC: 0.72 MG/DL (ref 0.72–1.25)
CREAT/UREA NIT SERPL: 16
EOSINOPHIL # BLD AUTO: 0.21 X10(3)/MCL (ref 0–0.9)
EOSINOPHIL NFR BLD AUTO: 3 %
ERYTHROCYTE [DISTWIDTH] IN BLOOD BY AUTOMATED COUNT: 13 % (ref 11.5–17)
GFR SERPLBLD CREATININE-BSD FMLA CKD-EPI: >60 ML/MIN/1.73/M2
GLOBULIN SER-MCNC: 4.3 GM/DL (ref 2.4–3.5)
GLUCOSE SERPL-MCNC: 109 MG/DL (ref 74–100)
HCT VFR BLD AUTO: 43.3 % (ref 42–52)
HGB BLD-MCNC: 14.5 G/DL (ref 14–18)
IMM GRANULOCYTES # BLD AUTO: 0.01 X10(3)/MCL (ref 0–0.04)
IMM GRANULOCYTES NFR BLD AUTO: 0.1 %
LYMPHOCYTES # BLD AUTO: 1.21 X10(3)/MCL (ref 0.6–4.6)
LYMPHOCYTES NFR BLD AUTO: 17.1 %
MCH RBC QN AUTO: 30.5 PG (ref 27–31)
MCHC RBC AUTO-ENTMCNC: 33.5 G/DL (ref 33–36)
MCV RBC AUTO: 91.2 FL (ref 80–94)
MONOCYTES # BLD AUTO: 0.66 X10(3)/MCL (ref 0.1–1.3)
MONOCYTES NFR BLD AUTO: 9.3 %
NEUTROPHILS # BLD AUTO: 4.96 X10(3)/MCL (ref 2.1–9.2)
NEUTROPHILS NFR BLD AUTO: 70.2 %
PLATELET # BLD AUTO: 187 X10(3)/MCL (ref 130–400)
PMV BLD AUTO: 10.9 FL (ref 7.4–10.4)
POTASSIUM SERPL-SCNC: 5.1 MMOL/L (ref 3.5–5.1)
PROT SERPL-MCNC: 7.9 GM/DL (ref 6.4–8.3)
PROT UR QL STRIP: ABNORMAL
RBC # BLD AUTO: 4.75 X10(6)/MCL (ref 4.7–6.1)
SODIUM SERPL-SCNC: 136 MMOL/L (ref 136–145)
WBC # BLD AUTO: 7.07 X10(3)/MCL (ref 4.5–11.5)

## 2025-03-19 PROCEDURE — 3077F SYST BP >= 140 MM HG: CPT | Mod: CPTII,S$GLB,, | Performed by: NURSE PRACTITIONER

## 2025-03-19 PROCEDURE — 3008F BODY MASS INDEX DOCD: CPT | Mod: CPTII,S$GLB,, | Performed by: NURSE PRACTITIONER

## 2025-03-19 PROCEDURE — 99215 OFFICE O/P EST HI 40 MIN: CPT | Mod: S$GLB,,, | Performed by: NURSE PRACTITIONER

## 2025-03-19 PROCEDURE — G2211 COMPLEX E/M VISIT ADD ON: HCPCS | Mod: S$GLB,,, | Performed by: NURSE PRACTITIONER

## 2025-03-19 PROCEDURE — 85025 COMPLETE CBC W/AUTO DIFF WBC: CPT

## 2025-03-19 PROCEDURE — 81005 URINALYSIS: CPT

## 2025-03-19 PROCEDURE — 1160F RVW MEDS BY RX/DR IN RCRD: CPT | Mod: CPTII,S$GLB,, | Performed by: NURSE PRACTITIONER

## 2025-03-19 PROCEDURE — 80053 COMPREHEN METABOLIC PANEL: CPT

## 2025-03-19 PROCEDURE — 82378 CARCINOEMBRYONIC ANTIGEN: CPT

## 2025-03-19 PROCEDURE — 4010F ACE/ARB THERAPY RXD/TAKEN: CPT | Mod: CPTII,S$GLB,, | Performed by: SOCIAL WORKER

## 2025-03-19 PROCEDURE — 99999 PR PBB SHADOW E&M-EST. PATIENT-LVL I: CPT | Mod: PBBFAC,,, | Performed by: SOCIAL WORKER

## 2025-03-19 PROCEDURE — 90832 PSYTX W PT 30 MINUTES: CPT | Mod: S$GLB,,, | Performed by: SOCIAL WORKER

## 2025-03-19 PROCEDURE — 1159F MED LIST DOCD IN RCRD: CPT | Mod: CPTII,S$GLB,, | Performed by: NURSE PRACTITIONER

## 2025-03-19 PROCEDURE — 99999 PR PBB SHADOW E&M-EST. PATIENT-LVL V: CPT | Mod: PBBFAC,,, | Performed by: NURSE PRACTITIONER

## 2025-03-19 PROCEDURE — 36415 COLL VENOUS BLD VENIPUNCTURE: CPT

## 2025-03-19 PROCEDURE — 4010F ACE/ARB THERAPY RXD/TAKEN: CPT | Mod: CPTII,S$GLB,, | Performed by: NURSE PRACTITIONER

## 2025-03-19 PROCEDURE — 3080F DIAST BP >= 90 MM HG: CPT | Mod: CPTII,S$GLB,, | Performed by: NURSE PRACTITIONER

## 2025-03-19 RX ORDER — BACLOFEN 10 MG/1
10 TABLET ORAL EVERY 6 HOURS PRN
Qty: 30 TABLET | Refills: 3 | Status: SHIPPED | OUTPATIENT
Start: 2025-03-19 | End: 2026-03-19

## 2025-03-19 RX ORDER — PROMETHAZINE HYDROCHLORIDE 25 MG/1
25 TABLET ORAL EVERY 6 HOURS PRN
Qty: 34 TABLET | Refills: 2 | Status: SHIPPED | OUTPATIENT
Start: 2025-03-19

## 2025-03-19 NOTE — PROGRESS NOTES
Individual Psychotherapy (LCSW/PhD)  Owen Swanson,  3/19/2025    Site: [place]     Therapeutic Intervention: Met with patient for individual psychotherapy.    Chief complaint/reason for encounter: depression and physical pain     Interval history and content of current session: Owen Swanson is a 44 year old male who sees Dr. Gipson in the Oncology Clinic. I met with Owen for about 30 minutes today following an appointment with the NP. He comes to therapy willingly and with a lot to talk about. We probably could have met for another 30 minutes. He was in significant pain and could barely sit during our session. He utilizes deep breathing exercises and guided imagery when he is at home. I also discussed CBT techniques emphasizing neuropsychology. He was very emotional during the time we spent together. He does not want to die and he does not want to live in so much pain. He is hopeful with the option to go to [facility]. Our clinic made the referral today and he will call to set up an appointment. He is trying desperately to have some normalcy in his life, however the pain is unbearable. He is applying for social security and he reported his wife is picking up other jobs like [employer] delivery and other food deliver options. We will meet again in 3 weeks and hopefully there will be some appointments made at [facility]. He stated he is not suicidal. He repeated he does not want to die.       Treatment plan:  Target symptoms: depression, grief, pain  Why chosen therapy is appropriate versus another modality: relevant to diagnosis, patient responds to this modality, evidence based practice  Outcome monitoring methods: self-report, observation  Therapeutic intervention type: insight oriented psychotherapy, behavior modifying psychotherapy, supportive psychotherapy, interactive psychotherapy    Risk parameters:  Patient reports no suicidal ideation  Patient reports no homicidal ideation  Patient reports no self-injurious  behavior  Patient reports no violent behavior    Verbal deficits: None    Patient's response to intervention:  The patient's response to intervention is accepting.    Progress toward goals and other mental status changes:  The patient's progress toward goals is good.    Diagnosis:     ICD-10-CM ICD-9-CM   1. Depression, unspecified depression type  F32.A 311   2. Anxiety about health  R45.89 799.29       Plan: Pt plans to continue individual psychotherapy    Return to clinic: as scheduled    Length of Service (minutes): 30

## 2025-03-24 ENCOUNTER — OFFICE VISIT (OUTPATIENT)
Dept: PALLIATIVE MEDICINE | Facility: CLINIC | Age: 45
End: 2025-03-24
Payer: COMMERCIAL

## 2025-03-24 VITALS
HEIGHT: 69 IN | BODY MASS INDEX: 29.16 KG/M2 | RESPIRATION RATE: 16 BRPM | TEMPERATURE: 98 F | WEIGHT: 196.88 LBS | HEART RATE: 66 BPM | DIASTOLIC BLOOD PRESSURE: 97 MMHG | OXYGEN SATURATION: 100 % | SYSTOLIC BLOOD PRESSURE: 139 MMHG

## 2025-03-24 DIAGNOSIS — F32.A ANXIETY AND DEPRESSION: ICD-10-CM

## 2025-03-24 DIAGNOSIS — G89.3 NEOPLASM RELATED PAIN: ICD-10-CM

## 2025-03-24 DIAGNOSIS — C78.00 COLON CANCER METASTASIZED TO LUNG: ICD-10-CM

## 2025-03-24 DIAGNOSIS — C20 RECTAL CANCER: ICD-10-CM

## 2025-03-24 DIAGNOSIS — F41.9 ANXIETY AND DEPRESSION: ICD-10-CM

## 2025-03-24 DIAGNOSIS — C19 COLORECTAL CANCER, STAGE IV: ICD-10-CM

## 2025-03-24 DIAGNOSIS — C18.9 COLON CANCER METASTASIZED TO LUNG: ICD-10-CM

## 2025-03-24 DIAGNOSIS — Z51.5 ENCOUNTER FOR PALLIATIVE CARE: Primary | ICD-10-CM

## 2025-03-24 DIAGNOSIS — M54.32 SCIATIC PAIN, LEFT: ICD-10-CM

## 2025-03-24 PROCEDURE — 3075F SYST BP GE 130 - 139MM HG: CPT | Mod: CPTII,S$GLB,,

## 2025-03-24 PROCEDURE — 99215 OFFICE O/P EST HI 40 MIN: CPT | Mod: S$GLB,,,

## 2025-03-24 PROCEDURE — 3080F DIAST BP >= 90 MM HG: CPT | Mod: CPTII,S$GLB,,

## 2025-03-24 PROCEDURE — 1160F RVW MEDS BY RX/DR IN RCRD: CPT | Mod: CPTII,S$GLB,,

## 2025-03-24 PROCEDURE — 99999 PR PBB SHADOW E&M-EST. PATIENT-LVL V: CPT | Mod: PBBFAC,,,

## 2025-03-24 PROCEDURE — 4010F ACE/ARB THERAPY RXD/TAKEN: CPT | Mod: CPTII,S$GLB,,

## 2025-03-24 PROCEDURE — 1159F MED LIST DOCD IN RCRD: CPT | Mod: CPTII,S$GLB,,

## 2025-03-24 PROCEDURE — 3008F BODY MASS INDEX DOCD: CPT | Mod: CPTII,S$GLB,,

## 2025-03-24 RX ORDER — GABAPENTIN 600 MG/1
600 TABLET ORAL NIGHTLY
COMMUNITY
End: 2025-03-26 | Stop reason: SDUPTHER

## 2025-03-24 RX ORDER — ALPRAZOLAM 0.25 MG/1
0.25 TABLET ORAL 2 TIMES DAILY PRN
Qty: 60 TABLET | Refills: 0 | Status: SHIPPED | OUTPATIENT
Start: 2025-03-24

## 2025-03-24 RX ORDER — OXYCODONE HYDROCHLORIDE 5 MG/1
5 TABLET ORAL EVERY 6 HOURS PRN
Qty: 90 TABLET | Refills: 0 | Status: SHIPPED | OUTPATIENT
Start: 2025-03-24

## 2025-03-24 RX ORDER — GABAPENTIN 600 MG/1
600 TABLET ORAL NIGHTLY
Status: CANCELLED | OUTPATIENT
Start: 2025-03-24

## 2025-03-24 RX ORDER — GABAPENTIN 300 MG/1
300 CAPSULE ORAL 2 TIMES DAILY
Qty: 30 CAPSULE | Refills: 2 | Status: CANCELLED | OUTPATIENT
Start: 2025-03-24

## 2025-03-24 NOTE — PROGRESS NOTES
Palliative Clinic    Patient ID: [id number]     Chief Complaint: Follow-up      HPI:     Owen Swanson is a 44 y.o. male here today for follow up of symptom management and goals of care with palliative medicine service.  His primary diagnosis is stage IV colorectal cancer with metastases to lung, liver, and abdominal lymph nodes.    Symptom management:  Pain.  Current regimen includes oxycodone 5 mg q.6 hours p.r.n., Lidoderm patch (not using because it is not affordable at present), Tylenol and ibuprofen p.r.n., gabapentin 300 mg q.a.m. and 600 mg q.h.s. Had previously discussed trial of Voltaren gel, which he did not try yet- encouraged trial. Using frankescence oil topically with quick, temporary, immediate relief. Pain remains to RUQ, across midabdomen, and lower back. Abdominal pain is described as aching, bloating, pressure, and throbbing/spasms. Pain at worst is 8-9/10, and resolves completely occasionally (although infrequently). He is taking 2-3 oxycodone doses per day, now that he is not working and is not worried about daytime grogginess/driving. Has stopped working since last week due to increase in pain, which is noted in both oncology and LCSW note. He reports back pain is to lower back, radiating down posterior left thigh with burning sensation that has recently worsened. This may be related to rectal cancer versus lumbar disease, pending evaluation by Dr. Bennett Freitas for recent CT findings. Will increase gabapentin by 300 mg at lunch, which he states having tried this week due to pain and had improvement. Recently prescribed Baclofen by oncology for muscle spasms, but did not  the medication yet. Had previously discussed trial of muscle relaxer if no improvement in pain. Discussed possible sedation effects and to trial medication alone, timing dose away from other sedating medications. He verbalizes understanding. Reiterated that he needs to call and notify clinic staff if he has  significant pain increase.  Anxiety and depression.  Continue Xanax 0.25 mg BID p.r.n..  Did not tolerate Cymbalta previously.  Has been requiring Xanax b.i.d. to decrease agitation.  He is working on improve coping mechanisms with LCSW, which has helped him significantly. He does not feel symptoms of agitation as severe, compared to previous. States dynamics at home are changing and that is also helping with anxiety/agitation. Previously discussed initiation of maintenance medication if he continues to require BID dosing. Discussed that as symptoms are improving he may be able to deescalate regimen and not require BID dosing, he is hopeful of this. If he continues to need BID dosing at next visit we will plan to initiate maintenance medication.  Insomnia.  Taking Xanax and gabapentin at night.  Additionally reports utilizing medical marijuana or Benadryl PRN if waking during the night and unable to return to sleep, which has improved sleep. He reports less concern with AM grogginess due to middle of the night benadryl PRN now that he is not having to wake and report to work in the morning.  Nausea.  Associated with oxycodone when not taking food. Has improved when eating with medication administration.  Bowels.  Daily bowel movement with no regimen. Having pain externally with BM, discussed to trial preparation H wipes for topical relief if needed. States he will trial this option.  Appetite.  Overall stable weight and reports decent appetite.    Past Medical History:   Diagnosis Date    Anxiety     Hypertension         History reviewed. No pertinent surgical history.     Social History     Tobacco Use    Smoking status: Former     Current packs/day: 0.00     Average packs/day: 0.5 packs/day for 10.0 years (5.0 ttl pk-yrs)     Types: Cigarettes     Quit date: 2016     Years since quittin.2    Smokeless tobacco: Never    Tobacco comments:     Pt smokes marijuana   Substance and Sexual Activity    Alcohol  use: Never    Drug use: Yes     Types: Marijuana    Sexual activity: Yes     Partners: Female     Birth control/protection: None        Current Outpatient Medications   Medication Instructions    albuterol (PROVENTIL) 2.5 mg, Every 6 hours PRN    ALPRAZolam (XANAX) 0.25 mg, Oral, 2 times daily PRN    amLODIPine-benazepriL (LOTREL) 10-40 mg per capsule 1 capsule, Oral, Daily    ascorbic acid, vitamin C, (VITAMIN C) 1000 MG tablet 4 tablets, Nightly    baclofen (LIORESAL) 10 mg, Oral, Every 6 hours PRN    cholecalciferol, vitamin D3, (VITAMIN D3) 25 mcg (1,000 unit) capsule 1 capsule, Nightly    CMPD hydrocortisone 2%- LIDOcaine 3% suppository (RECTAL ROCKET) 1 suppository, Rectal, Every 6 hours PRN, Insert 1 suppository 3/4 of the way onto rectum. Wet for easier application. Repeat for 3 nights.    cyanocobalamin (VITAMIN B-12) 100 MCG tablet 1 tablet, Nightly    DULoxetine (CYMBALTA) 30 mg, Oral, Daily    fruquintinib (FRUZAQLA) 5 mg Cap Take 1 capsule (5 mg) by mouth once daily for 21 days, then off for 7 days    gabapentin (NEURONTIN) 300 mg, Oral, 2 times daily    gabapentin (NEURONTIN) 600 mg, Oral, Nightly    LIDOcaine (LIDODERM) 5 % 1 patch, Transdermal, Daily, Remove & Discard patch within 12 hours or as directed by MD    magnesium 200 mg Tab 2 tablets, Nightly    metoprolol tartrate (LOPRESSOR) 50 mg, Oral, 2 times daily    NON FORMULARY MEDICATION 2 tablets, Daily    NON FORMULARY MEDICATION 1 Piece, Daily PRN    NON FORMULARY MEDICATION Quoc Gage oil (THC oil)    omeprazole (PRILOSEC) 20 MG capsule 1 capsule, Every morning    ondansetron (ZOFRAN) 4 mg, Every 8 hours PRN    oxyCODONE (ROXICODONE) 5 mg, Oral, Every 6 hours PRN    promethazine (PHENERGAN) 25 mg, Oral, Every 6 hours PRN    zinc acetate 50 mg (zinc) Cap 1 tablet, Nightly       Review of patient's allergies indicates:  No Known Allergies     Patient Care Team:  Quinn Ledesma MD as PCP - General (Family Medicine)  Lamar Mc, Zunilda  "as Pharmacist (Pharmacist)     Subjective:     Review of Systems   Constitutional:  Positive for fatigue.   HENT: Negative.     Respiratory: Negative.     Cardiovascular: Negative.    Gastrointestinal:  Positive for abdominal pain and rectal pain. Negative for constipation, diarrhea, nausea and vomiting.   Genitourinary: Negative.    Musculoskeletal:  Positive for back pain.   Neurological:  Positive for weakness.   Psychiatric/Behavioral:  The patient is nervous/anxious.        12 point review of systems conducted, negative except as stated in the history of present illness. See HPI for details.    Objective:     Visit Vitals  BP (!) 139/97 (BP Location: Left arm, Patient Position: Sitting)   Pulse 66   Temp 97.5 °F (36.4 °C) (Oral)   Resp 16   Ht 5' 9" (1.753 m)   Wt 89.3 kg (196 lb 13.9 oz)   SpO2 100%   BMI 29.07 kg/m²      Vitals:    03/24/25 1116   PainSc:   3   PainLoc: Generalized       Physical Exam  Constitutional:       General: He is not in acute distress.     Appearance: He is ill-appearing.   HENT:      Head: Normocephalic.      Mouth/Throat:      Mouth: Mucous membranes are moist.   Eyes:      Extraocular Movements: Extraocular movements intact.   Cardiovascular:      Rate and Rhythm: Normal rate and regular rhythm.      Heart sounds: Normal heart sounds. No murmur heard.  Pulmonary:      Effort: Pulmonary effort is normal. No respiratory distress.      Breath sounds: Normal breath sounds.   Abdominal:      General: There is no distension.   Musculoskeletal:      Right lower leg: No edema.      Left lower leg: No edema.   Skin:     General: Skin is warm and dry.   Neurological:      General: No focal deficit present.      Mental Status: He is alert and oriented to person, place, and time.         Review of Symptoms      Symptom Assessment (ESAS 0-10 Scale)       Anxiety:  Is nervous/anxious  Constipation:  No constipation    Living Arrangements:  Lives with spouse and Lives in " home    Psychosocial/Cultural:   See Palliative Psychosocial Note: Yes  **Primary  to Follow**  Palliative Care  Consult: No        Labs Reviewed:     Chemistry:  Lab Results   Component Value Date     03/19/2025    K 5.1 03/19/2025    BUN 11.4 03/19/2025    CREATININE 0.72 03/19/2025    EGFRNORACEVR >60 03/19/2025    GLUCOSE 109 (H) 03/19/2025    CALCIUM 9.8 03/19/2025    ALKPHOS 479 (H) 03/19/2025    LABPROT 7.9 03/19/2025    ALBUMIN 3.6 03/19/2025    AST 64 (H) 03/19/2025    ALT 90 (H) 03/19/2025        Hematology:  Lab Results   Component Value Date    WBC 7.07 03/19/2025    HGB 14.5 03/19/2025    HCT 43.3 03/19/2025     03/19/2025       Urine:  Lab Results   Component Value Date    APPEARANCEUA Clear 05/28/2024    SGUA 1.027 05/28/2024    PROTEINUA 3+ (A) 03/19/2025    KETONESUA Trace (A) 05/28/2024    LEUKOCYTESUR Negative 05/28/2024    RBCUA 0-5 05/28/2024    WBCUA 0-5 05/28/2024    BACTERIA None Seen 05/28/2024    SQEPUA Trace 05/28/2024    PROTEINURINE 48.5 11/13/2024        Advanced Care Planning:     Advance Care Planning     Date: 03/24/2025    [facility]  I engaged the patient in a voluntary conversation about advance care planning and we specifically addressed what the goals of care would be moving forward, in light of the patient's change in clinical status, specifically current condition.  We did specifically address the patient's likely prognosis, which is fair .  We explored the patient's values and preferences for future care.  The patient endorses that what is most important right now is to focus on remaining as independent as possible, symptom/pain control, and improvement in condition but with limits to invasive therapies    Accordingly, we have decided that the best plan to meet the patient's goals includes continuing with treatment       Advance Directives:   Living Will: Yes        Copy on chart: Yes    Medical Power of : Yes      Decision Making:   "Patient answered questions  Goals of Care: What is most important right now is to focus on remaining as independent as possible, symptom/pain control, improvement in condition but with limits to invasive therapies. Accordingly, we have decided that the best plan to meet the patient's goals includes continuing with treatment.      Goals of care:  Surrogate decision maker.  HCPOA is his spouse, Marie Swanson.  ACP Documents and [place]. Living will selections include "that life-sustaining procedures, except nutrition and hydration, be withheld or withdrawn so that food and water can be administered invasively." Reviewed and confirmed selections today.  Oncologic treatment plan.  Current treatment plan includes Fruquintinib.  Last office visit with Oncology on 03/19 with recent imaging showing minimal progression to his hepatic disease, stable disease elsewhere.  Previously MD Guthrie had recommended returning if he had disease progression, which is now their plan and referral was placed by Oncology. Patient states this is pending insurance approval.    Assessment:       ICD-10-CM ICD-9-CM   1. Encounter for palliative care  Z51.5 V66.7   2. Neoplasm related pain  G89.3 338.3   3. Colorectal cancer, stage IV  C19 154.0   4. Anxiety and depression  F41.9 300.00    F32.A 311   5. Colon cancer metastasized to lung  C18.9 153.9    C78.00 197.0   6. Rectal cancer  C20 154.1   7. Sciatic pain, left  M54.32 724.3        Plan:     1. Encounter for palliative care  - Ongoing discussions regarding goals of care  - HCPOA and LW on file and reviewed  - Ongoing discussions regarding [place]    2. Neoplasm related pain  - Refill oxyCODONE (ROXICODONE) 5 MG immediate release tablet; Take 1 tablet (5 mg total) by mouth every 6 (six) hours as needed for Pain.  Dispense: 90 tablet; Refill: 0  - Increase gabapentin (NEURONTIN) 300 MG capsule; Take 1 capsule (300 mg total) by mouth 2 (two) times daily qAM and at lunch. Gabapentin 600 MG " tablet; Take 1 tablet (600 mg total) by mouth every evening. This is increased from 300mg qAM and 600mg qHS.  - Trial Voltaren gel OTC  - Trial Baclofen as prescribed by Oncology  - Lidoderm patches if able to afford    3. Colorectal cancer, stage IV  - Per Hematology/Oncology recommendations  - Pending referral to [facility]    4. Anxiety and depression  - Refill ALPRAZolam (XANAX) 0.25 MG tablet; Take 1 tablet (0.25 mg total) by mouth 2 (two) times daily as needed for Anxiety.  Dispense: 60 tablet; Refill: 0  - Medical Marijuana per [employer]    7. Sciatic pain, left  - Gabapentin adjustments as above  - Pending evaluation by ortho    [x] Discussed risks associated with narcotic use, including sedation, nausea, and constipation. Sedation precautions include: not driving or operating heavy machinery after initiating medication. Take medication with a meal or snack to prevent nausea. Discussed addition of stool softener to prevent opioid induced constipation.    I have reviewed the narcotic prescription data via .    Follow up in about 1 month (around 4/24/2025) for Follow-up with NP. In addition to their scheduled follow up, the patient has also been instructed to follow up on as needed basis.        Future Appointments   Date Time Provider Department Center   4/9/2025 10:00 AM Lucian Henry LCSW [facility]   4/17/2025  8:30 AM LAB, [facility] LAB [facility]   4/17/2025  9:00 AM [facility]son MD [facility]   4/28/2025 11:00 AM Mell Pierson NP [facility] PAL [facility]   6/6/2025  8:00 AM INJECTION CHAIR 03, [facility] CHEMOTHERAPY INFUSION [facility] CHEMO [facility]        Mell Pierson NP

## 2025-03-26 RX ORDER — GABAPENTIN 600 MG/1
600 TABLET ORAL NIGHTLY
Qty: 30 TABLET | Refills: 0 | Status: SHIPPED | OUTPATIENT
Start: 2025-03-26

## 2025-03-26 RX ORDER — GABAPENTIN 300 MG/1
300 CAPSULE ORAL 2 TIMES DAILY
Qty: 60 CAPSULE | Refills: 0 | Status: SHIPPED | OUTPATIENT
Start: 2025-03-26

## 2025-04-09 ENCOUNTER — OFFICE VISIT (OUTPATIENT)
Dept: HEMATOLOGY/ONCOLOGY | Facility: CLINIC | Age: 45
End: 2025-04-09
Payer: COMMERCIAL

## 2025-04-09 DIAGNOSIS — F41.9 ANXIETY AND DEPRESSION: ICD-10-CM

## 2025-04-09 DIAGNOSIS — F32.A DEPRESSION, UNSPECIFIED DEPRESSION TYPE: Primary | ICD-10-CM

## 2025-04-09 DIAGNOSIS — F32.A ANXIETY AND DEPRESSION: ICD-10-CM

## 2025-04-09 PROCEDURE — 90837 PSYTX W PT 60 MINUTES: CPT | Mod: S$GLB,,, | Performed by: SOCIAL WORKER

## 2025-04-09 PROCEDURE — 4010F ACE/ARB THERAPY RXD/TAKEN: CPT | Mod: CPTII,S$GLB,, | Performed by: SOCIAL WORKER

## 2025-04-09 NOTE — PROGRESS NOTES
Individual Psychotherapy (LCSW/PhD)  Owen Swanson,  4/9/2025    Site: [place]     Therapeutic Intervention: Met with patient for individual psychotherapy.    Chief complaint/reason for encounter: depression and anger and pain    Interval history and content of current session: Owen Swanson is a 45 year old male who sees Dr. Gipson in the Oncology Clinic. I met with Owen for about 60 minutes today, face to face. He comes to therapy willingly and with a lot to talk about and views this opportunity as a place he can get things off of his chest and someone to listen. He continues to be in significant pain and tries hard to manage it with his pain meds. He stated today he takes a pill and hopefully sleeps a little then wakes up to take another pill. We probably could have met for another 30 minutes. He utilizes deep breathing exercises and guided imagery when he is at home. I also discussed CBT techniques emphasizing neuropsychology. He has an appointment with MD Guthrie on Monday, April 14. He is of a realistic mindset. He isn't hopeful about the upcoming appointment and stated he will deal with whatever happens. The last appointment I provided him with a letter and paperwork to bring with him to the social security office. He picked that up today and will look into the application. [place] [facility] [place]  a gas card. We will meet again in 3 weeks. He stated he is not suicidal. He repeated he does not want to die.       Treatment plan:  Target symptoms: depression, anxiety , adjustment, grief, financial stress  Why chosen therapy is appropriate versus another modality: relevant to diagnosis, patient responds to this modality, evidence based practice  Outcome monitoring methods: self-report, observation  Therapeutic intervention type: insight oriented psychotherapy, behavior modifying psychotherapy, supportive psychotherapy, interactive psychotherapy    Risk parameters:  Patient  reports no suicidal ideation  Patient reports no homicidal ideation  Patient reports no self-injurious behavior  Patient reports no violent behavior    Verbal deficits: None    Patient's response to intervention:  The patient's response to intervention is accepting.    Progress toward goals and other mental status changes:  The patient's progress toward goals is fair .    Diagnosis:     ICD-10-CM ICD-9-CM   1. Depression, unspecified depression type  F32.A 311   2. Anxiety and depression  F41.9 300.00    F32.A 311       Plan: Pt plans to continue individual psychotherapy    Return to clinic: 3 weeks    Length of Service (minutes): 60

## 2025-04-24 ENCOUNTER — TELEPHONE (OUTPATIENT)
Dept: HEMATOLOGY/ONCOLOGY | Facility: CLINIC | Age: 45
End: 2025-04-24
Payer: COMMERCIAL

## 2025-04-24 NOTE — TELEPHONE ENCOUNTER
I called pt to f/u on his n/s to appt this morning. His voice mailbox is still full. I called pt's wife (Marie) and she states that they are at [facility] right now for a follow up appt. Pt started a clinical trial last week. She is unaware of the details as far as will he just go there or if he will f/u with Dr Gipson. She hopes to get that information at f/u appt today and will call me.

## 2025-04-29 ENCOUNTER — PATIENT OUTREACH (OUTPATIENT)
Dept: ADMINISTRATIVE | Facility: CLINIC | Age: 45
End: 2025-04-29
Payer: COMMERCIAL

## 2025-04-29 ENCOUNTER — OFFICE VISIT (OUTPATIENT)
Dept: HEMATOLOGY/ONCOLOGY | Facility: CLINIC | Age: 45
End: 2025-04-29
Payer: COMMERCIAL

## 2025-04-29 VITALS
HEART RATE: 89 BPM | BODY MASS INDEX: 27.65 KG/M2 | OXYGEN SATURATION: 94 % | DIASTOLIC BLOOD PRESSURE: 79 MMHG | SYSTOLIC BLOOD PRESSURE: 109 MMHG | WEIGHT: 186.69 LBS | RESPIRATION RATE: 14 BRPM | TEMPERATURE: 98 F | HEIGHT: 69 IN

## 2025-04-29 DIAGNOSIS — C20 RECTAL CANCER: ICD-10-CM

## 2025-04-29 DIAGNOSIS — C78.02 MALIGNANT NEOPLASM METASTATIC TO BOTH LUNGS: Primary | ICD-10-CM

## 2025-04-29 DIAGNOSIS — C78.01 MALIGNANT NEOPLASM METASTATIC TO BOTH LUNGS: Primary | ICD-10-CM

## 2025-04-29 DIAGNOSIS — K83.1 OBSTRUCTIVE JAUNDICE: ICD-10-CM

## 2025-04-29 DIAGNOSIS — C78.7 SECONDARY MALIGNANT NEOPLASM OF LIVER: ICD-10-CM

## 2025-04-29 DIAGNOSIS — C77.8 MALIGNANT NEOPLASM METASTATIC TO LYMPH NODES OF MULTIPLE SITES: ICD-10-CM

## 2025-04-29 PROCEDURE — 3078F DIAST BP <80 MM HG: CPT | Mod: CPTII,S$GLB,, | Performed by: INTERNAL MEDICINE

## 2025-04-29 PROCEDURE — 3008F BODY MASS INDEX DOCD: CPT | Mod: CPTII,S$GLB,, | Performed by: INTERNAL MEDICINE

## 2025-04-29 PROCEDURE — 99999 PR PBB SHADOW E&M-EST. PATIENT-LVL V: CPT | Mod: PBBFAC,,, | Performed by: INTERNAL MEDICINE

## 2025-04-29 PROCEDURE — 3074F SYST BP LT 130 MM HG: CPT | Mod: CPTII,S$GLB,, | Performed by: INTERNAL MEDICINE

## 2025-04-29 PROCEDURE — 99215 OFFICE O/P EST HI 40 MIN: CPT | Mod: S$GLB,,, | Performed by: INTERNAL MEDICINE

## 2025-04-29 PROCEDURE — 4010F ACE/ARB THERAPY RXD/TAKEN: CPT | Mod: CPTII,S$GLB,, | Performed by: INTERNAL MEDICINE

## 2025-04-29 PROCEDURE — 1159F MED LIST DOCD IN RCRD: CPT | Mod: CPTII,S$GLB,, | Performed by: INTERNAL MEDICINE

## 2025-04-29 PROCEDURE — G2211 COMPLEX E/M VISIT ADD ON: HCPCS | Mod: S$GLB,,, | Performed by: INTERNAL MEDICINE

## 2025-04-29 PROCEDURE — 1160F RVW MEDS BY RX/DR IN RCRD: CPT | Mod: CPTII,S$GLB,, | Performed by: INTERNAL MEDICINE

## 2025-04-29 RX ORDER — OXYCODONE HYDROCHLORIDE 15 MG/1
15 TABLET, FILM COATED, EXTENDED RELEASE ORAL EVERY 12 HOURS
COMMUNITY
Start: 2025-04-22 | End: 2025-04-29 | Stop reason: SDUPTHER

## 2025-04-29 RX ORDER — OXYCODONE HYDROCHLORIDE 15 MG/1
15 TABLET, FILM COATED, EXTENDED RELEASE ORAL EVERY 12 HOURS
Qty: 60 TABLET | Refills: 0 | Status: SHIPPED | OUTPATIENT
Start: 2025-04-29

## 2025-04-29 NOTE — PROGRESS NOTES
Subjective:       Patient ID: Owen Swanson is a 45 y.o. male.    Rectal Cancer Stage IV--Diagnosed 23  Biopsy/pathology:   1. Colonoscopy 23--large fungating rectal mass 5-10cm from anal verge to approximately 20cm, biopsy with well differentiated colorectal adenocarcinoma.   2. Bronchoscopy with biopsy of endobronchial mass RUL biopsy done 3/16/23--metastatic poorly differentiated colonic adenocarcinoma. Foundation One with PD-L1 0%, ALK rearrangement, KRAS G12V, AKT2 amplification, APCV, FBXW7, CCND3 amplification, TP53, VEGFA amplification, TMB 8, MSI-high not detected, elevated tumor fraction.   3. Liquid Biopsy Guardant 360 done 24--KRAS G12V, APC , TP53 R248W, MSI high not detected, NRAS, BRAF, Her2 and NTRK negative.  Guardant 360 liquid biopsy done 24--KRAS G12V, APC and TP53 mutations.   Imagin. PET/CT 24--interval enlargement and increased metabolic activity of primary rectal mass, c/w local disease progression, stable hypermetabolic hepatic metastatic disease, grossly stable infiltrating tumor in RUL bronchus, suspected subtle interval enlargement of subcentimeter nodule in TIGRE and RLL.   2. CT C/A/P w/ contrast done at [facility] 3/18/24--Similar to slight increase in size of left upper and right lower lobe pulmonary nodules. Grossly similar right upper lobe peribronchial lesion and nodularity, similar to slight increase in size of conglomerate maria isabel hepatic and peripancreatic lymphadenopathy, grossly similar metastatic liver lesions.   3. PET/CT 24--Findings concerning for progression of primary rectal mass and metastatic disease.   4. CT A/P done 24--long segment rectal wall thickening and nodularity compatible with known rectal malignancy, multiple calcified abdominopelvic lymph nodes compatible with metastatic nodes. Reference portal caval lymph node measures 2.4 cm short axis, Multiple bilobar hypodense, variably calcified hepatic masses compatible with  colorectal metastases. Largest discrete lesion in the right lobe measures approximately 8.7 cm, findings similar to prior.   5. CT C/A/P done 7/24/24--progressive pulmonary metastatic disease, bulky branching endobronchial tumor anterior RUL, appears enlarged but difficult to measure, 4.5cm (prior 2.5cm), nodule at TIGRE measures 10mm (prior 7mm), nodule RLL measures 16mm (prior 12mm), progressive hepatic metastatic disease, few lesions enlarged two in hepatic segment VI measure 2.6 and 4.4cm (prior 1.9 and 4cm), majority appear stable, large pathologic lymph nodes in maria isabel hepatis have progressed, for example LN anterior to celiac artery measures 2.8cm vs. Prior 2.3cm, new 2cm splenic lesion, potentially metastatic, large rectal mass 8cm, similar, invading the mesorectal fat, grossly stable partially calcified metastatic lymph nodes in mesorectal fat and retroperitoneum.   6. CT C/A/P done 10/9/24--Areas of endobronchial soft tissue in the right upper lobe have similar overall size, solid nodule in the right lower lobe measures 17 mm, previously 16 mm. The left upper lobe nodule measures 13 mm, previously 10 mm. No definitive new suspicious nodule. Stable 11 mm right lower paratracheal lymph node. Liver lesions and periportal lymph nodes have similar sizes since July, although with mild decrease in overall attenuation.  Suspect this relates to post treatment effect. Slightly improved rectal wall thickening.  The splenic hypodensity is smaller.  7. CT C/A/P done 1/14/25--Right more advanced than left pulmonary metastatic disease is similar in appearance with no new metastatic disease. Extensive hepatic metastatic disease and abdominopelvic adenopathy is all similar, chronic circumferential rectal wall thickening c/w known primary malignancy is unchanged.  8.  CT C/A/P on 3/12/25--Grossly stable pulmonary metastasis, with a dominant 8 cm diameter lesion in the anterior right upper lobe obstructing the right upper lobe  anterior bronchus. Minimal progression of hepatic metastatic disease, with at least 1 hepatic metastatic lesion appearing significantly enlarged in the interval. Grossly stable partially calcified mariya metastasis throughout the abdomen and pelvis, including at the maria isabel hepatis. Grossly stable rectal mass, measuring at least 8 cm in length. Moderate lower lumbar spine disc disease.  9. CT C/A/P at [facility] 4/14/25--Locally advanced primary rectal tumor with metastatic pelvic lymphadenopathy, extensive bilobar hepatic metastatic disease, pulmonary metastatic disease with likely metastatic mediastinal and right hilar lymph nodes. Nonspecific right adrenal nodule and splenic hypodensity.    10. CT A/P w/ contrast at [facility] 4/24/25--Disseminated, multiorgan, multifocal metastatic disease from locally advanced rectal malignancy as described above. Associated persistent left-sided intrahepatic biliary ductal dilatation due to hepatic metastatic disease.     Treatment history:  FOLFOX/Avastin 2/22/23 followed by maintenance.  Progression in 11/2023.   FOLFIRI/Avastin 11/2023--4/29/24 (stopped due to progression).  Alectinib 5/22/24--7/26/24 (stopped due to progression).  Fruquintinib 5mg daily X 21 days on/7 off. Started on 8/5/24.   Cycle 9 started on 3/17/25. Patient to stop once received word from [facility] (progression).    CEA:   05/09/24--904  06/12/24--1,131.53  07/01/24--1,191.49  07/16/24--1,496.72  08/19/24--1,073.40  09/05/24--1,349.70  09/19/24--907.29  10/01/24--1,424.78  10/17/24--914.90  11/14/24--922.8  12/09/24--825.35  01/09/25--786.91  02/17/25--1,286.57    Current treatment plan:   Clinical trial at [facility]  5712-1451 ACR-2316 (dual inhibitor of both WEE1 and PKMYT1) 240 mg PO Daily 3 days on /4 days off repeated weekly in a 21 day cycle      Chief Complaint: Back Pain, Constipation, and Diarrhea    HPI  Patient presents for follow-up of Stage IV rectal cancer. He was placed on clinical trial at [facility], but had significant  side effects. Also noted to have markedly elevated bilirubin after 1 week of treatment. GI was consulted and he was felt not to be a candidate for ERCP due to an isolated left duct, cannot be reached by ERCP, recommended IR PTC placement, but IR unable to place PTC and did not feel this would alleviate the problem. Patient is discouraged with his current situation which is understandable. We discussed that with his liver worsening, no further treatment is recommended and could even possibly be detrimental and shorten his life. We discussed that immunotherapy is unlikely to work, given that his PD-L1 was 0%, TMB low and JESUS. They did contact [facility], but he is not quite ready yet. He is requesting for us to resend referral to palliative care. Patient reports that his pain is under control better on the oxycontin and oxycodone PRN. He has alternating constipation and diarrhea, but his bowels keep moving.     Past Medical History:   Diagnosis Date    Anxiety     Hypertension       History reviewed. No pertinent surgical history.  Family History   Problem Relation Name Age of Onset    Hypertension Mother      Coronary artery disease Father Bora Swanson Jr.     Hypertension Father Bora Swanson Jr.     Diabetes Father Bora Swanson Jr.     Cancer Father Bora Swanson Jr.      Social History     Socioeconomic History    Marital status:    Tobacco Use    Smoking status: Former     Current packs/day: 0.00     Average packs/day: 0.5 packs/day for 10.0 years (5.0 ttl pk-yrs)     Types: Cigarettes     Quit date: 2016     Years since quittin.3    Smokeless tobacco: Never    Tobacco comments:     Pt smokes marijuana   Substance and Sexual Activity    Alcohol use: Never    Drug use: Yes     Types: Marijuana    Sexual activity: Yes     Partners: Female     Birth control/protection: None     Social Drivers of Health     Financial Resource Strain: High Risk (3/10/2025)    Overall Financial  Resource Strain (CARDIA)     Difficulty of Paying Living Expenses: Very hard   Food Insecurity: Food Insecurity Present (3/10/2025)    Hunger Vital Sign     Worried About Running Out of Food in the Last Year: Often true     Ran Out of Food in the Last Year: Often true   Transportation Needs: No Transportation Needs (3/10/2025)    PRAPARE - Transportation     Lack of Transportation (Medical): No     Lack of Transportation (Non-Medical): No   Physical Activity: Insufficiently Active (3/10/2025)    Exercise Vital Sign     Days of Exercise per Week: 2 days     Minutes of Exercise per Session: 30 min   Stress: Stress Concern Present (3/10/2025)    [employer] - Occupational Stress Questionnaire     Feeling of Stress : Rather much   Housing Stability: High Risk (3/10/2025)    Housing Stability Vital Sign     Unable to Pay for Housing in the Last Year: Yes     Number of Times Moved in the Last Year: 0     Homeless in the Last Year: No       Review of patient's allergies indicates:  No Known Allergies   Current Outpatient Medications on File Prior to Visit   Medication Sig Dispense Refill    albuterol (PROVENTIL) 2.5 mg /3 mL (0.083 %) nebulizer solution Inhale 2.5 mg into the lungs every 6 (six) hours as needed.      amLODIPine-benazepriL (LOTREL) 10-40 mg per capsule Take 1 capsule by mouth once daily. 30 capsule 3    ascorbic acid, vitamin C, (VITAMIN C) 1000 MG tablet Take 4 tablets by mouth every evening.      baclofen (LIORESAL) 10 MG tablet Take 1 tablet (10 mg total) by mouth every 6 (six) hours as needed (muscle spasms). 30 tablet 3    cholecalciferol, vitamin D3, (VITAMIN D3) 25 mcg (1,000 unit) capsule Take 1 capsule by mouth every evening.      cyanocobalamin (VITAMIN B-12) 100 MCG tablet Take 1 tablet by mouth every evening.      gabapentin (NEURONTIN) 600 MG tablet Take 1 tablet (600 mg total) by mouth every evening. 30 tablet 0    magnesium 200 mg Tab Take 2 tablets by mouth every  evening.      metoprolol tartrate (LOPRESSOR) 50 MG tablet Take 1 tablet (50 mg total) by mouth 2 (two) times daily. 60 tablet 3    NON FORMULARY MEDICATION Take 2 tablets by mouth Daily. Iron blood builder      NON FORMULARY MEDICATION Take 1 Piece by mouth daily as needed (pain). THC gummies. Patient stated 1 piece or less per day.      NON FORMULARY MEDICATION Quoc Gage oil (THC oil)      ondansetron (ZOFRAN) 4 MG tablet Take 4 mg by mouth every 8 (eight) hours as needed.      oxyCODONE (ROXICODONE) 5 MG immediate release tablet Take 1 tablet (5 mg total) by mouth every 6 (six) hours as needed for Pain. 90 tablet 0    promethazine (PHENERGAN) 25 MG tablet Take 1 tablet (25 mg total) by mouth every 6 (six) hours as needed for Nausea. 34 tablet 2    zinc acetate 50 mg (zinc) Cap Take 1 tablet by mouth every evening.      [DISCONTINUED] OXYCONTIN 15 mg TR12 12 hr tablet Take 15 mg by mouth every 12 (twelve) hours.      ALPRAZolam (XANAX) 0.25 MG tablet Take 1 tablet (0.25 mg total) by mouth 2 (two) times daily as needed for Anxiety. (Patient not taking: Reported on 4/29/2025) 60 tablet 0    CMPD hydrocortisone 2%- LIDOcaine 3% suppository (RECTAL ROCKET) Place 1 suppository rectally every 6 (six) hours as needed (anorectal pain). Insert 1 suppository 3/4 of the way onto rectum. Wet for easier application. Repeat for 3 nights. (Patient not taking: Reported on 4/29/2025) 12 applicator 1    DULoxetine (CYMBALTA) 30 MG capsule Take 1 capsule (30 mg total) by mouth once daily. (Patient not taking: Reported on 4/29/2025) 30 capsule 1    gabapentin (NEURONTIN) 300 MG capsule Take 1 capsule (300 mg total) by mouth 2 (two) times daily. (Patient not taking: Reported on 4/29/2025) 60 capsule 0    LIDOcaine (LIDODERM) 5 % Place 1 patch onto the skin once daily. Remove & Discard patch within 12 hours or as directed by MD (Patient not taking: Reported on 4/29/2025) 15 patch 1    omeprazole (PRILOSEC) 20 MG capsule Take 1  "capsule by mouth every morning. (Patient not taking: Reported on 4/29/2025)       Current Facility-Administered Medications on File Prior to Visit   Medication Dose Route Frequency Provider Last Rate Last Admin    [DISCONTINUED] GENERIC EXTERNAL MEDICATION     Provider, Generic External Data          Review of Systems   Constitutional:  Positive for appetite change and fatigue. Unexpected weight change: weight fluctuates.  HENT:  Negative for mouth sores.    Eyes:  Negative for visual disturbance.   Respiratory:  Positive for cough. Negative for shortness of breath.    Cardiovascular:  Negative for chest pain.   Gastrointestinal:  Positive for abdominal pain and constipation. Negative for diarrhea.   Genitourinary:  Positive for frequency.   Musculoskeletal:  Positive for back pain.   Integumentary:  Negative for rash.   Neurological:  Negative for headaches.   Hematological:  Negative for adenopathy.   Psychiatric/Behavioral:  The patient is nervous/anxious.          Vitals:    04/29/25 1459   BP: 109/79   Pulse: 89   Resp: 14   Temp: 98.3 °F (36.8 °C)   TempSrc: Oral   SpO2: (!) 94%   Weight: 84.7 kg (186 lb 11.2 oz)   Height: 5' 9" (1.753 m)       Wt Readings from Last 3 Encounters:   04/29/25 1459 84.7 kg (186 lb 11.2 oz)   03/24/25 1116 89.3 kg (196 lb 13.9 oz)   03/19/25 1320 89.7 kg (197 lb 12.8 oz)     Physical Exam  Constitutional:       General: He is awake.      Appearance: Normal appearance. He is overweight. He is ill-appearing.   HENT:      Head: Normocephalic and atraumatic.      Nose: Nose normal.      Mouth/Throat:      Mouth: Mucous membranes are moist.   Eyes:      General: Vision grossly intact. Scleral icterus present.      Extraocular Movements: Extraocular movements intact.      Conjunctiva/sclera: Conjunctivae normal.   Pulmonary:      Effort: Pulmonary effort is normal.   Chest:      Comments: Left chest wall mediport in place  Abdominal:      General: Abdomen is flat. Bowel sounds are " normal. There is no distension.      Palpations: Abdomen is soft.   Musculoskeletal:      Cervical back: Neck supple.      Right lower leg: No edema.      Left lower leg: No edema.   Lymphadenopathy:      Cervical: No cervical adenopathy.      Upper Body:      Right upper body: No supraclavicular adenopathy.      Left upper body: No supraclavicular adenopathy.   Neurological:      Mental Status: He is alert and oriented to person, place, and time.      Motor: Motor function is intact.   Psychiatric:         Mood and Affect: Mood normal.         Speech: Speech normal.         Behavior: Behavior is cooperative.         Judgment: Judgment normal.       No visits with results within 1 Day(s) from this visit.   Latest known visit with results is:   Lab Visit on 03/19/2025   Component Date Value Ref Range Status    Sodium 03/19/2025 136  136 - 145 mmol/L Final    Potassium 03/19/2025 5.1  3.5 - 5.1 mmol/L Final    Chloride 03/19/2025 102  98 - 107 mmol/L Final    CO2 03/19/2025 28  22 - 29 mmol/L Final    Glucose 03/19/2025 109 (H)  74 - 100 mg/dL Final    Blood Urea Nitrogen 03/19/2025 11.4  8.9 - 20.6 mg/dL Final    Creatinine 03/19/2025 0.72  0.72 - 1.25 mg/dL Final    Calcium 03/19/2025 9.8  8.4 - 10.2 mg/dL Final    Protein Total 03/19/2025 7.9  6.4 - 8.3 gm/dL Final    Albumin 03/19/2025 3.6  3.5 - 5.0 g/dL Final    Globulin 03/19/2025 4.3 (H)  2.4 - 3.5 gm/dL Final    Albumin/Globulin Ratio 03/19/2025 0.8 (L)  1.1 - 2.0 ratio Final    Bilirubin Total 03/19/2025 0.6  <=1.5 mg/dL Final    ALP 03/19/2025 479 (H)  40 - 150 unit/L Final    ALT 03/19/2025 90 (H)  0 - 55 unit/L Final    AST 03/19/2025 64 (H)  5 - 34 unit/L Final    eGFR 03/19/2025 >60  mL/min/1.73/m2 Final    Estimated GFR calculated using the CKD-EPI creatinine (2021) equation.    Anion Gap 03/19/2025 6.0  mEq/L Final    BUN/Creatinine Ratio 03/19/2025 16   Final    Carcinoembryonic Antigen 03/19/2025 1,099.21 (H)  0.00 - 3.00 ng/mL Final    Protein, UA  03/19/2025 3+ (A)  Negative Final    WBC 03/19/2025 7.07  4.50 - 11.50 x10(3)/mcL Final    RBC 03/19/2025 4.75  4.70 - 6.10 x10(6)/mcL Final    Hgb 03/19/2025 14.5  14.0 - 18.0 g/dL Final    Hct 03/19/2025 43.3  42.0 - 52.0 % Final    MCV 03/19/2025 91.2  80.0 - 94.0 fL Final    MCH 03/19/2025 30.5  27.0 - 31.0 pg Final    MCHC 03/19/2025 33.5  33.0 - 36.0 g/dL Final    RDW 03/19/2025 13.0  11.5 - 17.0 % Final    Platelet 03/19/2025 187  130 - 400 x10(3)/mcL Final    MPV 03/19/2025 10.9 (H)  7.4 - 10.4 fL Final    Neut % 03/19/2025 70.2  % Final    Lymph % 03/19/2025 17.1  % Final    Mono % 03/19/2025 9.3  % Final    Eos % 03/19/2025 3.0  % Final    Basophil % 03/19/2025 0.3  % Final    Imm Grans % 03/19/2025 0.1  % Final    Neut # 03/19/2025 4.96  2.1 - 9.2 x10(3)/mcL Final    Lymph # 03/19/2025 1.21  0.6 - 4.6 x10(3)/mcL Final    Mono # 03/19/2025 0.66  0.1 - 1.3 x10(3)/mcL Final    Eos # 03/19/2025 0.21  0 - 0.9 x10(3)/mcL Final    Baso # 03/19/2025 0.02  <=0.2 x10(3)/mcL Final    Imm Gran # 03/19/2025 0.01  0.00 - 0.04 x10(3)/mcL Final       Labs at [facility] on 4/26/25--bilirubin 7.7, AST 62, ALT 63, alk phos 360, sodium 133, chloride 96, otherwise okay. CBC with WBC normal, H/H 11.1/32.8, platelets 274.    Assessment:       1. Malignant neoplasm metastatic to both lungs    2. Malignant neoplasm metastatic to lymph nodes of multiple sites    3. Secondary malignant neoplasm of liver    4. Rectal cancer    5. Obstructive jaundice      Plan:       Patient with Stage IV rectal cancer, lung, liver, abdominal mariya metastases diagnosed in 2/2023. KRAS mutated.  Patient has failed FOLFOX avastin and FOLFIRI avastin.  Most Recent PET from 4/29/24 shows disease progression and CEA rising.    In review of previous Foundation One testing, noted to have an ALK rearrangement.   I did a literature review and there have been documented responses in colon cancers treatment with ALK inhibitors.   Case presented at [facility] clinical  trials tumor board. No current clinical trials available, but treatment recommended with Alectinib.   Case presented at molecular tumor board on 6/4/24, agreed with trial of Alectinib.    Discussed conventional treatment with 3rd line Stivarga vs. Lonsurf which have not historically had good outcomes. Also discussed incurable nature of disease and continued palliative goals of treatment.  Would favor a more aggressive approach to treatment given his excellent functional status and young age. Did not think Keytruda or immunotherapy would be effective given JESUS.     Patient started Alectinib 600mg PO BID on 5/22/24.  CT done 7/24/24 showed some stable disease, but mostly progression.  Recommend to change treatment to Fruquintinib per [facility].   Received 2 units PRBC on 7/29/24 for worsening anemia.    Patient started Fruquintinib 5mg daily X 21 days on/7 days off on 8/5/24.   Patient continues tolerating well.   CT C/A/P 1/14/25 showed stable disease.    Repeat CT C/A/P 3/12/25 with progressive hepatic metastatic disease.  Patient returned to [facility], was started on clinical trial and w/n a week, was noted to have markedly elevated bilirubin. Patient seen by GI and ERCP would not reach obstruction in left side, and percutaneous drain not possible to fix the problem per IR at [facility].    I have requested images from [facility] and I will have our IR here review to be sure no intervention is possible.     Otherwise due to POD and obstructive jaundice, no further treatment is possible.  Recommend supportive/comfort care.  I am not sure he is ready for hospice. Will send referral to palliative care Dr. Owen for patient to be seen and followed until time for hospice, per patient and wife request.     Continue Oxycontin 15mg PO BID and Oxycodone 5mg PO PRN.  Oxycontin refilled today.     RTC PRN.     It has been an honor and a privilege to care for Mr. Swanson.  He was told to contact me if I can help him in any way. This is a very  unfortunate case.    All questions answered at this time.    Renee Gipson MD    Visit today included increased complexity associated with the care of the episodic problem metastatic rectal cancer, addressing and managing the longitudinal care of the patient's rectal cancer.

## 2025-05-05 ENCOUNTER — TELEPHONE (OUTPATIENT)
Dept: HEMATOLOGY/ONCOLOGY | Facility: CLINIC | Age: 45
End: 2025-05-05
Payer: COMMERCIAL

## 2025-05-05 NOTE — TELEPHONE ENCOUNTER
I did have our IR physician review the scans and he agrees with [facility] IR.  No intervention is possible due to the large tumors in his liver causing likely multiple obstructive areas.  I notified patient's wife, as there was no answer on his phone.    Renee Gipson MD